# Patient Record
Sex: FEMALE | Race: WHITE | NOT HISPANIC OR LATINO | Employment: OTHER | URBAN - METROPOLITAN AREA
[De-identification: names, ages, dates, MRNs, and addresses within clinical notes are randomized per-mention and may not be internally consistent; named-entity substitution may affect disease eponyms.]

---

## 2017-01-18 PROBLEM — Z13.9 SCREENING: Status: ACTIVE | Noted: 2017-01-18

## 2017-08-05 PROBLEM — Z13.9 SCREENING: Status: RESOLVED | Noted: 2017-01-18 | Resolved: 2017-08-05

## 2018-12-16 PROBLEM — E87.6 DIURETIC-INDUCED HYPOKALEMIA: Status: ACTIVE | Noted: 2018-12-16

## 2018-12-16 PROBLEM — T50.2X5A DIURETIC-INDUCED HYPOKALEMIA: Status: ACTIVE | Noted: 2018-12-16

## 2019-02-19 PROBLEM — L72.3 INFLAMED SEBACEOUS CYST: Status: ACTIVE | Noted: 2019-02-19

## 2019-02-21 PROBLEM — E87.6 DIURETIC-INDUCED HYPOKALEMIA: Status: RESOLVED | Noted: 2018-12-16 | Resolved: 2019-02-21

## 2019-02-21 PROBLEM — T50.2X5A DIURETIC-INDUCED HYPOKALEMIA: Status: RESOLVED | Noted: 2018-12-16 | Resolved: 2019-02-21

## 2020-01-21 ENCOUNTER — TELEPHONE (OUTPATIENT)
Dept: NEUROLOGY | Facility: CLINIC | Age: 75
End: 2020-01-21

## 2020-01-21 NOTE — TELEPHONE ENCOUNTER
----- Message from Carla Gary sent at 1/21/2020  9:45 AM CST -----  Contact: Aliza  Patient's daughter called in regards to scheduling appt for her mom. Has referral in [facility]. Touching base, she was told if she did not hear from office to call to schedule.     Please call to schedule: [phone]- Aliza    Patient- [phone]

## 2020-01-21 NOTE — TELEPHONE ENCOUNTER
Returned call to pt daughter Aliza and discussed at length accessibility and memory drs, etc. Notified that we did not have availability until April timeframe with Dr. Abraham as Dr. Le would be on maternity leave.Offered [facility] for quicker accessibility until we open our schedule for April which Aliza was agreeable with. Pt added to waiting list and we will call once open. Verbalized understanding.

## 2020-02-11 ENCOUNTER — TELEPHONE (OUTPATIENT)
Dept: NEUROLOGY | Facility: CLINIC | Age: 75
End: 2020-02-11

## 2020-02-11 NOTE — TELEPHONE ENCOUNTER
----- Message from Ana M Le sent at 2/11/2020  4:12 PM CST -----  Contact: Juli /daughter [phone]  Pt flora she was referred  to Dr. Ramirez states she needs to make an apt they have a referral in the system - Memory changes please call back to discuss

## 2020-02-11 NOTE — TELEPHONE ENCOUNTER
Returned call to pt as daughter unavailable. Notified that Dr. Le on emergency medical leave and unable to make appt until her return around July timeframe. Verbalized understanding.

## 2020-03-31 ENCOUNTER — OFFICE VISIT (OUTPATIENT)
Dept: NEUROLOGY | Facility: CLINIC | Age: 75
End: 2020-03-31
Payer: MEDICARE

## 2020-03-31 DIAGNOSIS — E55.9 VITAMIN D DEFICIENCY, UNSPECIFIED: ICD-10-CM

## 2020-03-31 DIAGNOSIS — R41.3 MEMORY DIFFICULTIES: Primary | ICD-10-CM

## 2020-03-31 DIAGNOSIS — E78.5 DYSLIPIDEMIA: ICD-10-CM

## 2020-03-31 DIAGNOSIS — I10 ESSENTIAL HYPERTENSION: ICD-10-CM

## 2020-03-31 DIAGNOSIS — Q21.12 PATENT FORAMEN OVALE: ICD-10-CM

## 2020-03-31 PROBLEM — L72.3 INFLAMED SEBACEOUS CYST: Status: RESOLVED | Noted: 2019-02-19 | Resolved: 2020-03-31

## 2020-03-31 PROCEDURE — 99204 OFFICE O/P NEW MOD 45 MIN: CPT | Mod: 95,GC,, | Performed by: PSYCHIATRY & NEUROLOGY

## 2020-03-31 PROCEDURE — 99204 PR OFFICE/OUTPT VISIT, NEW, LEVL IV, 45-59 MIN: ICD-10-PCS | Mod: 95,GC,, | Performed by: PSYCHIATRY & NEUROLOGY

## 2020-03-31 NOTE — ASSESSMENT & PLAN NOTE
- Several months of reported memory difficulties (repeating questions, etc) reported by family and friends of patient  - Daughter reports acute worsening since patients  had a stroke in October 2019  - Memory issues worse with difficulty sleeping and stress  - Diagnosis on MDD on Prozac being managed by psychiatry   - MRI 5/18 without significant abnormality, no need to repeat imaging at this time  - Abbreviated MOCA 3/31/2020: 20/22 (<19 for MCI)    Plan  - Continued management of MDD  - MOCA not significant for MCI, but abbreviated version done due to telemedicine   - Recommend ambulatory referral to neuropsych for further evaluation   - Will check basic lab work (TSH, Vitamin D, CBC, CMP, B vitamins, Folate)

## 2020-03-31 NOTE — PROGRESS NOTES
"Telemedicine Virtual Visit  The patient location is: Patients Home  The chief complaint leading to consultation is: Memory difficulties   Visit type: Virtual visit with synchronous audio and video  Total time spent with patient: 60 minutes  Each patient to whom he or she provides medical services by telemedicine is:  (1) informed of the relationship between the physician and patient and the respective role of any other health care provider with respect to management of the patient; and (2) notified that he or she may decline to receive medical services by telemedicine and may withdraw from such care at any time.    Neurology Clinic  Initial Consult    Patient Name: Christine Garcia  MRN: [id number]    CC: Memory Difficulties     HPI: Christine Garcia is a 44-year-old female with a medical history significant for hypertension, major depression disorder (on Prozac 20mg, managed by psych), hyperlipidemia who presents to clinic today for evaluation of several months of progressive memory difficulty.  Patient is seen over virtual visit through telemedicine due to ongoing COVID-19 pandemic.  Patient is accompanied by her daughter who provides additional history.  Daughter states that for the past 6-7 months her mother has had increasing difficulty with memory, stating that she repeats questions and stories.  Patient denies that there is a problem at this time.  Patient denies any history of getting lost, difficulty driving or difficulty managing her activities of daily living.  Daughter states that she manages her mother's finances since her father's stroke in October of 2019.  Daughter states that she does call nightly to ensure that her mother has taken her medications, with the patient stating that she "has never been good with pills".  Patient's daughter states that the patient's memory difficulties acutely worsened in October 2019 following her husbands stroke.  Patient states that she is now his primary caregiver and that " this is associated significant amounts of stress and worsening depression. Patient reports that her diet has also worsened and that she no longer has an appetite.     During history, the patient was able to participate in conversation and acted appropriately.  A abbreviated MOCA was done over video chat with the patient scoring 20/22 (<19 is associated with MCI). Review of patients chart shows a previous MRI Brain in 5/2018 that showed no significant abnormalities other then age-appropriate generalized involutional changes most compatible with mild chronic microvascular disease.       Review of Systems   Constitutional: Positive for malaise/fatigue. Negative for chills, fever and weight loss.   HENT: Negative for ear pain, hearing loss, sinus pain and sore throat.    Eyes: Negative for blurred vision, double vision and photophobia.   Respiratory: Negative for cough and shortness of breath.    Cardiovascular: Negative for chest pain, leg swelling and PND.   Gastrointestinal: Negative for abdominal pain, constipation, diarrhea, nausea and vomiting.   Genitourinary: Negative for dysuria, frequency and urgency.   Musculoskeletal: Negative for back pain, falls, myalgias and neck pain.   Neurological: Negative for dizziness, tingling, tremors, sensory change, speech change, focal weakness, seizures, loss of consciousness, weakness and headaches.   Psychiatric/Behavioral: Positive for depression and memory loss. Negative for hallucinations and substance abuse. The patient is not nervous/anxious and does not have insomnia.        Past Medical History  Past Medical History:   Diagnosis Date    Dyslipidemia 10/2/2015    Hyperlipidemia     Hypertension     minimal    Migraines     Obesity 10/2/2015    Patent foramen ovale 10/2/2015       Medications    Current Outpatient Medications:     aspirin (ECOTRIN) 81 MG EC tablet, Take 81 mg by mouth once daily. , Disp: , Rfl:     atorvastatin (LIPITOR) 10 MG tablet, Take 10 mg  by mouth once daily., Disp: , Rfl:     cycloSPORINE (RESTASIS) 0.05 % ophthalmic emulsion, Place 1 drop into both eyes 2 (two) times daily., Disp: , Rfl:     doxycycline (VIBRA-TABS) 100 MG tablet, Take 1 tablet (100 mg total) by mouth 2 (two) times daily., Disp: 14 tablet, Rfl: 0    lorcaserin (BELVIQ) 10 mg Tab, Take 10 mg by mouth 2 (two) times daily., Disp: 60 tablet, Rfl: 0    meloxicam (MOBIC) 15 MG tablet, Take 1 tablet (15 mg total) by mouth once daily. (Patient taking differently: Take 15 mg by mouth as needed. ), Disp: 30 tablet, Rfl: 2    methylPREDNISolone (MEDROL DOSEPACK) 4 mg tablet, use as directed, Disp: 1 Package, Rfl: 0    nystatin (MYCOSTATIN) 100,000 unit/mL suspension, Take 6 mLs (600,000 Units total) by mouth 3 (three) times daily. Swish and swallow., Disp: 120 mL, Rfl: 0  No current facility-administered medications for this visit.     Facility-Administered Medications Ordered in Other Visits:     omnipaque 350 iohexol 80 mL, 80 mL, Intravenous, ONCE PRN, BRENDEN Bailey Jr., MD  Any other notable medications as documented in HPI    Allergies  Review of patient's allergies indicates:  No Known Allergies    Social History  Social History     Socioeconomic History    Marital status:      Spouse name: Not on file    Number of children: Not on file    Years of education: Not on file    Highest education level: Not on file   Occupational History    Not on file   Social Needs    Financial resource strain: Not on file    Food insecurity:     Worry: Not on file     Inability: Not on file    Transportation needs:     Medical: Not on file     Non-medical: Not on file   Tobacco Use    Smoking status: Former Smoker     Packs/day: 0.50     Years: 15.00     Pack years: 7.50    Smokeless tobacco: Never Used   Substance and Sexual Activity    Alcohol use: Yes     Alcohol/week: 1.0 standard drinks     Types: 1 Shots of liquor per week     Comment: occ    Drug use: No    Sexual  activity: Yes     Partners: Male     Birth control/protection: Post-menopausal   Lifestyle    Physical activity:     Days per week: Not on file     Minutes per session: Not on file    Stress: Not on file   Relationships    Social connections:     Talks on phone: Not on file     Gets together: Not on file     Attends Taoist service: Not on file     Active member of club or organization: Not on file     Attends meetings of clubs or organizations: Not on file     Relationship status: Not on file   Other Topics Concern    Not on file   Social History Narrative    Not on file     Any other notable Social History as documented in HPI.    Family History  Family History   Problem Relation Age of Onset    Diabetes Father     Heart disease Father     COPD Father      Any other notable FMH as documented in HPI.    Physical Exam  There were no vitals taken for this visit.    Physical Exam   Constitutional: She is oriented to person, place, and time. She appears well-developed and well-nourished. No distress.   Eyes: EOM are normal.   Neck: Normal range of motion.   Pulmonary/Chest: No respiratory distress.   Musculoskeletal: Normal range of motion.   Neurological: She is alert and oriented to person, place, and time.   Skin: She is not diaphoretic.   Nursing note and vitals reviewed.      Neurologic Exam: The patient is awake, alert and oriented. Language is fluent.  Fund of knowledge is appropriate.     Cranial nerves:   Ocular motility is full in all cardinal positions of gaze.   Facial activation is symmetric.     Motor examination of all extremities demonstrates full range of movement in all 4 extremities. Antigravity.     Sensory examination   Deferred due to video visit    Deep tendon reflexes  Deferred due to video visit    Gait: Normal tandem, and casual gait.    Coordination:   Deferred due to video visit    Miscellaneous:   Abbreviated MOCA 20/22 (placed in media tab)      Lab and Test Results    WBC   Date  Value Ref Range Status   01/30/2019 7.63 3.90 - 12.70 K/uL Final   12/13/2018 9.04 3.90 - 12.70 K/uL Final   07/18/2018 6.46 3.90 - 12.70 K/uL Final     Hemoglobin   Date Value Ref Range Status   01/30/2019 15.3 12.0 - 16.0 g/dL Final   12/13/2018 14.9 12.0 - 16.0 g/dL Final   07/18/2018 15.4 12.0 - 16.0 g/dL Final     Hematocrit   Date Value Ref Range Status   01/30/2019 45.2 37.0 - 48.5 % Final   12/13/2018 43.4 37.0 - 48.5 % Final   07/18/2018 43.9 37.0 - 48.5 % Final     Platelets   Date Value Ref Range Status   01/30/2019 277 150 - 350 K/uL Final   12/13/2018 303 150 - 350 K/uL Final   07/18/2018 260 150 - 350 K/uL Final     Glucose   Date Value Ref Range Status   01/30/2019 78 70 - 110 mg/dL Final     Comment:     The ADA recommends the following guidelines for fasting glucose:  Normal:       less than 100 mg/dL  Prediabetes:  100 mg/dL to 125 mg/dL  Diabetes:     126 mg/dL or higher     12/21/2018 73 70 - 110 mg/dL Final     Comment:     The ADA recommends the following guidelines for fasting glucose:  Normal:       less than 100 mg/dL  Prediabetes:  100 mg/dL to 125 mg/dL  Diabetes:     126 mg/dL or higher     12/13/2018 86 70 - 110 mg/dL Final     Comment:     The ADA recommends the following guidelines for fasting glucose:  Normal:       less than 100 mg/dL  Prediabetes:  100 mg/dL to 125 mg/dL  Diabetes:     126 mg/dL or higher       Sodium   Date Value Ref Range Status   01/30/2019 139 136 - 145 mmol/L Final   12/21/2018 139 136 - 145 mmol/L Final   12/13/2018 140 136 - 145 mmol/L Final     Potassium   Date Value Ref Range Status   01/30/2019 4.7 3.5 - 5.1 mmol/L Final   12/21/2018 4.3 3.5 - 5.1 mmol/L Final   12/13/2018 3.2 (L) 3.5 - 5.1 mmol/L Final     Chloride   Date Value Ref Range Status   01/30/2019 105 95 - 110 mmol/L Final   12/21/2018 106 95 - 110 mmol/L Final   12/13/2018 100 95 - 110 mmol/L Final     CO2   Date Value Ref Range Status   01/30/2019 27 22 - 31 mmol/L Final   12/21/2018 24 22 -  31 mmol/L Final   12/13/2018 30 22 - 31 mmol/L Final     BUN, Bld   Date Value Ref Range Status   01/30/2019 17 7 - 18 mg/dL Final   12/21/2018 16 7 - 18 mg/dL Final   12/13/2018 20 (H) 7 - 18 mg/dL Final     Creatinine   Date Value Ref Range Status   01/30/2019 0.81 0.50 - 1.40 mg/dL Final   12/21/2018 0.68 0.50 - 1.40 mg/dL Final   12/13/2018 0.77 0.50 - 1.40 mg/dL Final     Calcium   Date Value Ref Range Status   01/30/2019 9.9 8.4 - 10.2 mg/dL Final   12/21/2018 9.0 8.4 - 10.2 mg/dL Final   12/13/2018 9.4 8.4 - 10.2 mg/dL Final     Magnesium   Date Value Ref Range Status   10/03/2017 1.8 1.6 - 2.6 mg/dL Final     Alkaline Phosphatase   Date Value Ref Range Status   01/30/2019 86 38 - 145 U/L Final   12/13/2018 80 38 - 145 U/L Final   09/16/2017 72 38 - 145 U/L Final     ALT   Date Value Ref Range Status   01/30/2019 25 10 - 44 U/L Final   12/13/2018 21 10 - 44 U/L Final   12/04/2017 33 10 - 44 U/L Final     AST   Date Value Ref Range Status   01/30/2019 25 14 - 36 U/L Final   12/13/2018 23 14 - 36 U/L Final   09/16/2017 25 14 - 36 U/L Final     Images:  All previous relevant imaging has been personally interpreted by myself      Assessment and Plan    Problem List Items Addressed This Visit        Neuro    Memory difficulties - Primary    Current Assessment & Plan     - Several months of reported memory difficulties (repeating questions, etc) reported by family and friends of patient  - Daughter reports acute worsening since patients  had a stroke in October 2019  - Memory issues worse with difficulty sleeping and stress  - Diagnosis on MDD on Prozac being managed by psychiatry   - MRI 5/18 without significant abnormality, no need to repeat imaging at this time  - Abbreviated MOCA 3/31/2020: 20/22 (<19 for MCI)    Plan  - Continued management of MDD  - MOCA not significant for MCI, but abbreviated version done due to telemedicine   - Recommend ambulatory referral to neuropsych for further evaluation   -  Will check basic lab work (TSH, Vitamin D, CBC, CMP, B vitamins, Folate)         Relevant Orders    Comprehensive metabolic panel    CBC auto differential    TSH    VITAMIN D    VITAMIN B12    FOLATE    Ambulatory referral/consult to Neuropsychology    VITAMIN B1    VITAMIN B6    VITAMIN B2       Cardiac/Vascular    Essential hypertension    Current Assessment & Plan     Continue management per primary care for secondary stroke prevention         Dyslipidemia    Current Assessment & Plan     Continued management per primary care for secondary stroke prevention  Continue atorvastatin         Relevant Orders    VITAMIN B1    VITAMIN B6    VITAMIN B2    Patent foramen ovale    Current Assessment & Plan     Continue aspirin and atorvastatin for secondary stroke prevention           Other Visit Diagnoses     Vitamin D deficiency, unspecified         Relevant Orders    VITAMIN D    VITAMIN B1    VITAMIN B6    VITAMIN B2            Jemal Marquez MD  Neurology Resident - PGY2  [facility]  [address] [place] [address]

## 2020-04-02 ENCOUNTER — TELEPHONE (OUTPATIENT)
Dept: NEUROLOGY | Facility: CLINIC | Age: 75
End: 2020-04-02

## 2020-04-02 NOTE — PROGRESS NOTES
I have reviewed the history and physical, assessments, and plan, I concur with her/his documentation of Christine Garcia. Patient was seen and examined with the resident.    Also discussed was the potential for pseudodementia given increased stress and possible depression associated with 's stroke.    Hazel Figueroa MD  General Neurology Staff  [facility]

## 2020-04-03 ENCOUNTER — PATIENT MESSAGE (OUTPATIENT)
Dept: NEUROLOGY | Facility: CLINIC | Age: 75
End: 2020-04-03

## 2020-04-06 ENCOUNTER — OFFICE VISIT (OUTPATIENT)
Dept: NEUROLOGY | Facility: CLINIC | Age: 75
End: 2020-04-06
Payer: MEDICARE

## 2020-04-06 DIAGNOSIS — R41.3 MEMORY DIFFICULTIES: ICD-10-CM

## 2020-04-06 DIAGNOSIS — F33.1 MAJOR DEPRESSIVE DISORDER, RECURRENT, MODERATE: ICD-10-CM

## 2020-04-06 PROCEDURE — 99499 NO LOS: ICD-10-PCS | Mod: 95,,, | Performed by: PSYCHIATRY & NEUROLOGY

## 2020-04-06 PROCEDURE — 96121 NUBHVL XM PHY/QHP EA ADDL HR: CPT | Mod: 95,,, | Performed by: PSYCHIATRY & NEUROLOGY

## 2020-04-06 PROCEDURE — 96116 PR NEUROBEHAVIORAL STATUS EXAM BY PSYCH/PHYS: ICD-10-PCS | Mod: 95,,, | Performed by: PSYCHIATRY & NEUROLOGY

## 2020-04-06 PROCEDURE — 99499 UNLISTED E&M SERVICE: CPT | Mod: 95,,, | Performed by: PSYCHIATRY & NEUROLOGY

## 2020-04-06 PROCEDURE — 96121 PR NEUROBEHAVIORAL STAT EXAM, EA ADDTL HR: ICD-10-PCS | Mod: 95,,, | Performed by: PSYCHIATRY & NEUROLOGY

## 2020-04-06 PROCEDURE — 96116 NUBHVL XM PHYS/QHP 1ST HR: CPT | Mod: 95,,, | Performed by: PSYCHIATRY & NEUROLOGY

## 2020-04-06 NOTE — PROGRESS NOTES
NEUROPSYCHOLOGY CONSULT (TELEHEALTH)    Referral Information  Name: Christine Garcia  MRN: [id number]  : 1945  Age: 74 y.o.  Race: White  Gender: female  Referring Provider: Jemal Marquez Md  [address] [place] [address]  Billing: See below for details as coding/billing has changed   Telemedicine:   The patient location is: [place], [place]  The provider location is: [place], [place]  The chief complaint leading to consultation/medical necessity is: Cognitive concerns and limited in-person visits due to COVID-19.  Visit type: Virtual visit with synchronous audio and video  Total time spent with patient: 60 minutes  Each patient to whom he or she provides medical services by telemedicine is:  (1) informed of the relationship between the physician and patient and the respective role of any other health care provider with respect to management of the patient; and (2) notified that he or she may decline to receive medical services by telemedicine and may withdraw from such care at any time.  Consent/Emergency Plan: The patient expressed an understanding of the purpose of the evaluation and consented to all procedures. I informed the patient of limits to confidentiality and discussed an emergency plan. She provided consent to speak with her daughter, who was present during the appointment.      SUMMARY/TREATMENT PLAN   Results from the interview indicate the following diagnoses and treatment plan recommendations. The patient may require help from family to follow a treatment plan.    Diagnoses  Problem List Items Addressed This Visit        Neuro    Memory difficulties    Overview     Ms. Garcia's family reported gradual cognitive changes (repeating questions) over the last approximately two years, with an increase in difficulty after her 's stroke in 2019. At this time, her children have taken over financial management, shopping, household tasks, and reminders to take medications.  Some of this assistance is due to increased stress and symptoms of depression. Brief cognitive screening was within normal limits during a neurology visit on 3/31/2020 (modified MOCA = 20/22).         Current Assessment & Plan     At this time, diagnosis is deferred pending neuropsychological evaluation. Ms. Garcia has memory difficulty and changes in her ability to complete some instrumental activities of daily living without assistance. However, she also has a history of depression and increased stressors, which may be contributing to memory changes. Differential diagnoses include mild cognitive impairment and vascular dementia.    Recommendations:   · Neuropsychology Follow-up: Follow-up when the clinic re-opens for a full assessment of cognitive functioning.     · Medical Follow-up: Continue usual follow up for current active medical issues.   Recommendations for Ms. Garcia:  a. Processing Speed:   i. Using multiple modalities (e.g., listening, writing notes, asking questions, recording) to learn new information is likely to allow additional time for processing, thus improving memory for the material.   ii. Allowing sufficient time to complete tasks will reduce frustration and help to ensure completion.  b. Executive Functioning:  i. Dont attempt to multi-task.  Separate tasks so that each can be completed one at a time.  ii. Consider using a calendar/day planner, as that may be effective to help you plan and stay on track.  Color-coding specific tasks by importance may add additional benefit to your planner.  iii. Break down large projects into smaller tasks and write down the steps to completing the task.  Taking notes while reading can help with recall.  c. Storing Information: Use the below strategies to help you further enhance how information is stored.  i. Rehearse - Immediately after seeing/hearing something, try to recall it.  Wait a few minutes, then check again.  Gradually lengthen the intervals  between rehearsals.  ii. Repetition of learned material is critical to ensure storage of information to be learned. Self-test at home to ensure learning.  iii. Write down important information to improve your attention and focus and to have something to look back on when you need to recall it.  iv. Make sure the person doesnt rattle off, but presents in a clear, logical, and unhurried manner.   d. Recalling Information:  i. Jog your memory - Lose something?  Think back to when you last had it.  What did you do next?  And after that?  Mentally walk yourself through each activity that followed.  Prodding your memory this way may enable you to recall the location of the missing item.  ii. Use a cue - Symbolic reminders (the proverbial string around the finger) are helpful.  So too are memos, timers, calendar notes, etc.--keep them in visible, appropriate places.  iii. Get organized - Have fixed locations for all important papers, key phone numbers, medications, keys, wallet, glasses, tools, etc.  iv. Develop routines - Routines can anchor memories so they do not drift away.    e. Resources: Consider resources for support through the Governors Office of Elderly Affairs (http://goea.[place].gov/), [place] Chapter of the Alzheimers Association (www.alz.org/[place]/), the Family Caregiver [place] (www.caregiver.org), and the American Psychological Association (http://www.apa.org/pi/about/publications/caregivers/consumers/index.aspxconsumers/index.aspx).    · Practice good cognitive/brain health hygiene:  · Engage in regular exercise, which increases alertness and arousal and can improve attention and focus.  Consider lower impact exercises, such as yoga or light walking.  · Get a good nights sleep, as this can enhance alertness and cognition.  · Eat healthy foods and balanced meals. It is notable that research indicates certain nutrients may aid in brain function, such as B vitamins (especially B6, B12, and  folic acid), antioxidants (such as vitamins C and E, and beta carotene), and Omega-3 fatty acids. Talk with your physician or nutritionist about whats right for you.   · Keep your brain active. Find activities to stay mentally active, such as reading, games (cards, checkers), puzzles (crosswords, Sudoku, jig saw), crafts (models, woodworking), gardening, or participating in activities in the community.  · Stay socially engaged. Continue staying active with your family and friends.    · Prepare for the future: Ms. Garcia and caregivers should consider formal arrangements to allow a designated person to make medical and financial decisions for Ms. Garcia, should she become unable to do so.  Options to consider include designating a healthcare proxy, medical and/or financial power of , and completing advanced directives for healthcare decisions and estate planning (e.g., finalizing a will).  If cost is prohibitive, [facility] [employer] (https://Candid io.org/) provides free  for individuals with low income.              Psychiatric    Major depressive disorder, recurrent, moderate    Overview     Ms. Garcia has a pre-existing history of depression for which she was prescribed medication. Symptoms increased following her 's stroke, and her daughter expressed concern that she was not taking her medication as prescribed.         Current Assessment & Plan     · Behavioral/Neuropsychiatric Symptoms:   · Medication Management:   · Continued medication management is recommended to treat depressed mood.    · Psychology/Therapy: Consultation with a therapist to address caregiver stress and depressed mood.             COVID-19 Recommendations:  Stay healthy:    Stay home as much as possible, avoid groups or crowds, and keep six feet away from other people   Wash your hands frequently with soap and water   Understand when and how to use a mask  who.int/emergencies/diseases/novel-coronavirus-2019/advice-for-public/when-and-how-to-use-masks   Ask a neighbor, friend, or relative to help run errands for you    Ask your pharmacy if they will deliver medications or find a pharmacy with a drive-thru window   Try a new budget friendly recipe StyleCaster.gov/eathealthy/budget/budget-recipes     Stay resourceful   Consider using a bidet (handheld bottle or toilet seat) as an alternative to toilet paper   Consider dry disposable washcloths with no-rinse cleanser or watered down baby shampoo as an alternative to pre-moistened wipes     Stay active:    Try dancing around the house to your favorite music   A pedal exerciser can be ordered online and offers an economical way to do some 'indoor bicycling'   Sit and Be Fit exercise video clips are available free on YouTPlanandoo.com/user/SitandBeFitTVSHOW   Other ideas for activities from the Alzheimer's Association: https://www.alz.org/media/Documents/alzheimers-dementia-activities-at-home-middle-late-b.pdf     Stay engaged:    Consider arranging a call or video chat schedule with friends and family. Ask relatives to send cards or letters by mail. Consider a television schedule of programs the person prefers (cooking shows, Episcopal services, music programs, old shows/movies).    [employer] Connected program offers free telephone-based educational programs and activities [employer]services/well-connected/   aqxhz4cdu4yfnce.com/ offers some free activity ideas   The [place] on Aging offers a free 24/7 [place] Line if you need to talk: [phone]     Please don't hesitate to stay in touch with us and let us know how you are doing.    Thank you for allowing me to participate in Ms. Garcia's care.  If you have any questions, please contact me at [phone].Melissa Zhao, Ph.D., ABPPBoard Certified in Clinical NeuropsychologyOchsner Health  Department of Neurology    HISTORY OF PRESENT  "ILLNESS AND CURRENT SYMPTOMS     Records noted Ms. Garcia initially visited neurology in March 2020. During that visit, her daughter reported 6-7 months of cognitive concerns, including repeating questions and stories. Her  had a stroke in October 2019, which has increased stress and exacerbated cognitive difficulties. Her daughter took over managing finances and reminds her to take her medications. Performance on a brief cognitive screening measure was within normal limits (modified Brijesh Cognitive Assessment [MoCA] = 20/22). She had difficulty with auditory attention and delayed recall (2/5 words, 3/3 words with cueing).    During the interview, Ms. Garcia reported some disorientation to date due to a change in routine but did not agree with her daughter's report of her being very forgetful.  She also reported adjusting to a change in her previous routine due to her 's stroke in October 2019 and serving as his primary caregiver.  She said her whole focus is on him but denied feeling depressed mood or feeling burnout.  Again, in contrast, her daughter has noticed that she no longer participates in activities for herself and has difficulty keeping up with activities of daily living in the context of caregiving for her .  Her daughter also noted that she and her siblings had concern for cognitive changes before October 2019, and those have not subsided with the increased stress.    Cognitive Symptoms:   Type/Examples:   · Attention: None reported.  · Mental Speed: Ms. Garcia said she is "laid back." Her daughter said she has noticed some slowing.   · Memory: Her daughter said she asks questions repetitively, even within a few hours. Ms. Garcia said she may forget conversations if they are not important. She said she stays on top of appointments, but her daughter indicated she had to purchase a calendar to place on the refrigrator.   · Language: Ms. Garcia reported longstanding word " finding difficulty. No receptive language difficulty.  · Visuospatial/Perceptual: None reported.  · Executive Functioning:Ms. Garcia reported she needs to use a calendar. Her daughter said she is less organized than she used to be.   Onset:  Her daughter noticed gradual changes starting in late 2017.    Course: Concern for cognitive change over time, with increased difficulty in the context of her 's stroke in October 2019.    Current Functional Status/Needs:  ADLs  Self-Care Eating Safety Other   Independent Independent Independent      Instrumental IADLs:   Driving Medications/Health Household Finances   No concerns noted. She has difficulty remembering to take her medication but also has had times when she did not want to take it. Her daughter gives reminders and manages her father's medication.  Ms. Garcia has help with household tasks after her 's stroke. Her daughter said she was not keeping her home as neatly prior to October 2019. Her daughter helps with laundry and grocery shopping. Family took over after October 2019, but her daughter said they had concern for mistakes prior to that time.       Psychiatric/Behavioral Symptoms:  Mood:  Depression/Dysphoria Anxiety/Fearfulness Irritability   Ms. Garcia said she feels sad at times. Her daughter said she has noticed depressed mood and lack of self care. Her daughter indicated friends and medical providers have noticed changes.  None reported. Her daughter noted some irritability due to stress.     Behavior:  Agitation/Resistance Delusions/Paranoia Hallucinations   Resistance to taking medication but will accept feedback and support from family without defensiveness. None reported None reported     Apathy/Motivation Repetitive/Restlessness Other   Her daughter noted reduced engagement in pleasurable activities. She asks repetitive questions and tells stories repeatedly.      Neurovegetative:  Sleep/Nighttime  [employer]et[employer]   She sleeps  well. Her daughter reported changes in diet. She eats less when depressed but eats sweets and coke. Her daughter is doing the grocery shopping. She is not physically active on a regular basis right now.     Suicidal/Homicidal Ideation: None reported.    Physical Symptoms: No changes in hearing or vision. No motor changes.     PERTINENT BACKGROUND INFORMATION   SOCIAL HISTORY    · Family Status:  approximately 50 years  · Current Living Situation: Lives with . Daughter lives nearby and checks on them frequently.  · Primary Source of Support: family, friends  · Daily Activities: Limited due to caregiving and COVID-19. She was an active volunteer, serving on [employer] and foundation boards, founding and serving as president of a community group. She continues to [employer].  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  Family Psychiatric History: Depression in the family and possible bipolar disorder    MEDICAL STATUS  Patient Active Problem List   Diagnosis    Essential hypertension    Dyslipidemia    Obesity    Patent foramen ovale    Memory difficulties    Major depressive disorder, recurrent, moderate     Past Medical History:   Diagnosis Date    Dyslipidemia 10/2/2015    Hyperlipidemia     Hypertension     minimal    Migraines     Obesity 10/2/2015    Patent foramen ovale 10/2/2015     Past Surgical History:   Procedure Laterality Date    COLONOSCOPY N/A 1/18/2017    Procedure: COLONOSCOPY;  Surgeon: Gerber Naylor MD;  [facility];  Service: Endoscopy;  Laterality: N/A;     HYSTERECTOMY      left elbow      left knee       Updated/Relevant Neurologic History:  · Falls: None reported  · TBI: None reported  · Seizures: None reported  · Stroke: None reported  · Movement Concerns: None reported  · Referral Diagnosis: Memory difficulties    Recent Labs and Imaging  No results found for: NGPJLGKM10  No results found for: RPR  No results found for: FOLATE  Lab Results   Component Value Date    TSH 1.650 01/30/2019     No results found for: LABA1C, HGBA1C  No results found for: HIV1X2, OSW58MNQH     Brain MRI:  5/29/2018:  Impression       1. No acute intracranial abnormality is visualized.  2. Age-appropriate generalized involutional changes are seen along with findings most compatible with mild chronic microvascular ischemic changes.  3. Additional findings and details as above.       Current Outpatient Medications:     aspirin (ECOTRIN) 81 MG EC tablet, Take 81 mg by mouth once daily. , Disp: , Rfl:     atorvastatin (LIPITOR) 10 MG tablet, Take 10 mg by mouth once daily., Disp: , Rfl:     cycloSPORINE (RESTASIS) 0.05 % ophthalmic emulsion, Place 1 drop into both eyes 2 (two) times daily., Disp: , Rfl:     doxycycline (VIBRA-TABS) 100 MG tablet, Take 1 tablet (100 mg total) by mouth 2 (two) times daily., Disp: 14 tablet, Rfl: 0    lorcaserin (BELVIQ) 10 mg Tab, Take 10 mg by mouth 2 (two) times daily., Disp: 60 tablet, Rfl: 0    meloxicam (MOBIC) 15 MG tablet, Take 1 tablet (15 mg total) by mouth once daily. (Patient taking differently: Take 15 mg by mouth as needed. ), Disp: 30 tablet, Rfl: 2    methylPREDNISolone (MEDROL DOSEPACK) 4 mg tablet, use as directed, Disp: 1 Package, Rfl: 0    nystatin (MYCOSTATIN) 100,000 unit/mL suspension, Take 6 mLs (600,000 Units total) by mouth 3 (three) times daily. Swish and swallow., Disp: 120 mL, Rfl: 0  No current facility-administered medications for this visit.     Facility-Administered Medications Ordered in Other Visits:     omnipaque  "350 iohexol 80 mL, 80 mL, Intravenous, ONCE PRN, BRENDEN Bailey Jr., MD     Ms. Garcia reported she is also taking fluoxetine.    Updated/Relevant Psychiatric History: Depression in the past for which she was prescribed medication and engaged in therapy.    No substance use concerns.    MENTAL STATUS AND OBSERVATIONS:  APPEARANCE: Casually dressed and adequate grooming/hygiene.   ALERTNESS/ORIENTATION: Attentive and alert. Generally oriented to time, with the exception of date. Fully oriented to place.   GAIT: Not assessed  MOTOR MOVEMENTS/MANNERISMS: Not assessed  SPEECH/LANGUAGE: Normal in rate, rhythm, tone, and volume. No significant word finding difficulty noted. Expressive and receptive language was normal.  STATED MOOD/AFFECT: The patients stated mood was "fine." Affect was congruent with stated mood.   INTERPERSONAL BEHAVIOR: Rapport was quickly and easily established   SUICIDALITY/HOMICIDALITY: Denied  HALLUCINATIONS/DELUSIONS: None evidenced or endorsed  THOUGHT PROCESSES/INSIGHT: Thoughts seemed logical and goal-directed.     QUESTIONNAIRE RESPONSES    Questionnaires were completed via telephone with a psychometrist and reviewed and incorporated by the neuropsychologist.    IADL 4/6/2020   Ability to Use Telephone Operates telephone on own initiative, looks up and dials numbers   Shopping Takes care of all shopping needs independently   Food Preparation Heats and serves prepared meals or prepares meals but does not maintain adequate diet   Housekeeping Performs light daily tasks, but cannot maintain acceptable level of cleanliness   Laundry Does personal laundry completely   Mode of Transportation Travels independently on public tranportation or drives own car   Responsibility for Own Medications Takes responsibility if medication is prepared in advance in separate dosages   Ability to Handle Finances Manages day-to-day purchases, but needs help with banking, major purchases, etc       NPIQ RFS " 4/6/2020   WHO IS FILLING OUT FORM? Caregiver   Does this patient have false beliefs, such as thinking that others are stealing from him/her or planning to harm him/her in some way? No   Does this patient have hallucinations such as false visions or voices? Kim she/he seem to hear or see things that are not present? No   Is the patient resistive to help from others at times, or hard to handle? No   Does the patient seem sad or say that he/she is depressed? Yes   Depression/Dysphoria Severity 3   Depression/Dysphoria Distress 6   Does the patient become upset when  from you? Does he/she have any other signs of nervousness such as shortness of breath, sighing, being unable tor elax, or feeling excessively tense? No   Does the patient appear to feel good or act excessively happy? No   Does this patient seem less interested in his/her usual activities or in the activities and plans of others? Yes   Apathy/Indifference Severity 3   Apathy/Indifference Distress 6   Does this patient seem to act cumpolsively, for example, talking to strangers as if she/he knows them, or saying things that may hurt people's feelings? No   Is the patient impatient and cranky? Does he/she have difficulty coping with delays or waiting for planned activities? No   Does the patient engage in repetitive activities such as pacing around the house, handling buttons, wrapping string, or doing other things repeatedly? No   Does this patient awaken you during the night, rise too early in the morning, or take excessive naps during the day? No   Has the patient lost or gained weight, or had a change in the type of food he/she likes? Yes   Apetitie/Eating Severity 3   Apetite/Eating Distress 6   NPI Total Severity Score 9   NPI Total Distress Score 18     BILLING  Service Description CPT Code Minutes Units   Neurobehavioral status exam by physician [id number] [phone]   Each additional hour by physician [id number] [phone]

## 2020-04-06 NOTE — Clinical Note
Thank you for the referral. I'll see them for testing when the schedule opens up. Please let me know if you have any questions.[place],Melissa

## 2020-04-08 PROBLEM — F33.1 MAJOR DEPRESSIVE DISORDER, RECURRENT, MODERATE: Status: ACTIVE | Noted: 2020-04-08

## 2020-04-08 NOTE — ASSESSMENT & PLAN NOTE
At this time, diagnosis is deferred pending neuropsychological evaluation. Ms. Garcia has memory difficulty and changes in her ability to complete some instrumental activities of daily living without assistance. However, she also has a history of depression and increased stressors, which may be contributing to memory changes. Differential diagnoses include mild cognitive impairment and vascular dementia.    Recommendations:   · Neuropsychology Follow-up: Follow-up when the clinic re-opens for a full assessment of cognitive functioning.     · Medical Follow-up: Continue usual follow up for current active medical issues.   Recommendations for Ms. Garcia:  a. Processing Speed:   i. Using multiple modalities (e.g., listening, writing notes, asking questions, recording) to learn new information is likely to allow additional time for processing, thus improving memory for the material.   ii. Allowing sufficient time to complete tasks will reduce frustration and help to ensure completion.  b. Executive Functioning:  i. Dont attempt to multi-task.  Separate tasks so that each can be completed one at a time.  ii. Consider using a calendar/day planner, as that may be effective to help you plan and stay on track.  Color-coding specific tasks by importance may add additional benefit to your planner.  iii. Break down large projects into smaller tasks and write down the steps to completing the task.  Taking notes while reading can help with recall.  c. Storing Information: Use the below strategies to help you further enhance how information is stored.  i. Rehearse - Immediately after seeing/hearing something, try to recall it.  Wait a few minutes, then check again.  Gradually lengthen the intervals between rehearsals.  ii. Repetition of learned material is critical to ensure storage of information to be learned. Self-test at home to ensure learning.  iii. Write down important information to improve your attention and focus and  to have something to look back on when you need to recall it.  iv. Make sure the person doesnt rattle off, but presents in a clear, logical, and unhurried manner.   d. Recalling Information:  i. Jog your memory - Lose something?  Think back to when you last had it.  What did you do next?  And after that?  Mentally walk yourself through each activity that followed.  Prodding your memory this way may enable you to recall the location of the missing item.  ii. Use a cue - Symbolic reminders (the proverbial string around the finger) are helpful.  So too are memos, timers, calendar notes, etc.--keep them in visible, appropriate places.  iii. Get organized - Have fixed locations for all important papers, key phone numbers, medications, keys, wallet, glasses, tools, etc.  iv. Develop routines - Routines can anchor memories so they do not drift away.    e. Resources: Consider resources for support through the Governors Office of Elderly Affairs ([place]://goea.[place].gov/), [place] Chapter of the Alzheimers Association (www.alz.org/[facility]/), the Family Caregiver [place] (www.caregiver.org), and the American Psychological Association (http://www.apa.org/pi/about/publications/caregivers/consumers/index.aspxconsumers/index.aspx).    · Practice good cognitive/brain health hygiene:  · Engage in regular exercise, which increases alertness and arousal and can improve attention and focus.  Consider lower impact exercises, such as yoga or light walking.  · Get a good nights sleep, as this can enhance alertness and cognition.  · Eat healthy foods and balanced meals. It is notable that research indicates certain nutrients may aid in brain function, such as B vitamins (especially B6, B12, and folic acid), antioxidants (such as vitamins C and E, and beta carotene), and Omega-3 fatty acids. Talk with your physician or nutritionist about whats right for you.   · Keep your brain active. Find activities to stay mentally active,  such as reading, games (cards, checkers), puzzles (crosswords, Sudoku, jig saw), crafts (models, woodworking), gardening, or participating in activities in the community.  · Stay socially engaged. Continue staying active with your family and friends.    · Prepare for the future: Ms. Garcia and caregivers should consider formal arrangements to allow a designated person to make medical and financial decisions for Ms. Garcia, should she become unable to do so.  Options to consider include designating a healthcare proxy, medical and/or financial power of , and completing advanced directives for healthcare decisions and estate planning (e.g., finalizing a will).  If cost is prohibitive, [facility] Legal Services (https://ls.org/) provides free  for individuals with low income.

## 2020-04-08 NOTE — ASSESSMENT & PLAN NOTE
· Behavioral/Neuropsychiatric Symptoms:   · Medication Management:   · Continued medication management is recommended to treat depressed mood.    · Psychology/Therapy: Consultation with a therapist to address caregiver stress and depressed mood.

## 2020-04-08 NOTE — PATIENT INSTRUCTIONS
Recommendations:   · Neuropsychology Follow-up: Follow-up when the clinic re-opens for a full assessment of cognitive functioning.     · Medical Follow-up: Continue usual follow up for current active medical issues.      · Behavioral/Neuropsychiatric Symptoms:   · Medication Management:   · Continued medication management is recommended to treat depressed mood.    · Psychology/Therapy: Consultation with a therapist to address caregiver stress and depressed mood.    Recommendations for Ms. Garcia:  a. Processing Speed:   i. Using multiple modalities (e.g., listening, writing notes, asking questions, recording) to learn new information is likely to allow additional time for processing, thus improving memory for the material.   ii. Allowing sufficient time to complete tasks will reduce frustration and help to ensure completion.  b. Executive Functioning:  i. Dont attempt to multi-task.  Separate tasks so that each can be completed one at a time.  ii. Consider using a calendar/day planner, as that may be effective to help you plan and stay on track.  Color-coding specific tasks by importance may add additional benefit to your planner.  iii. Break down large projects into smaller tasks and write down the steps to completing the task.  Taking notes while reading can help with recall.  c. Storing Information: Use the below strategies to help you further enhance how information is stored.  i. Rehearse - Immediately after seeing/hearing something, try to recall it.  Wait a few minutes, then check again.  Gradually lengthen the intervals between rehearsals.  ii. Repetition of learned material is critical to ensure storage of information to be learned. Self-test at home to ensure learning.  iii. Write down important information to improve your attention and focus and to have something to look back on when you need to recall it.  iv. Make sure the person doesnt rattle off, but presents in a clear, logical, and unhurried  manner.   d. Recalling Information:  i. Jog your memory - Lose something?  Think back to when you last had it.  What did you do next?  And after that?  Mentally walk yourself through each activity that followed.  Prodding your memory this way may enable you to recall the location of the missing item.  ii. Use a cue - Symbolic reminders (the proverbial string around the finger) are helpful.  So too are memos, timers, calendar notes, etc.--keep them in visible, appropriate places.  iii. Get organized - Have fixed locations for all important papers, key phone numbers, medications, keys, wallet, glasses, tools, etc.  iv. Develop routines - Routines can anchor memories so they do not drift away.    e. Resources: Consider resources for support through the Governors Office of Elderly Affairs (http://goea.[place].gov/), [place] Chapter of the Alzheimers Association (www.alz.org/[place]/), the Family Caregiver [place] (www.caregiver.org), and the American Psychological Association (http://www.apa.org/pi/about/publications/caregivers/consumers/index.aspxconsumers/index.aspx).    · Practice good cognitive/brain health hygiene:  · Engage in regular exercise, which increases alertness and arousal and can improve attention and focus.  Consider lower impact exercises, such as yoga or light walking.  · Get a good nights sleep, as this can enhance alertness and cognition.  · Eat healthy foods and balanced meals. It is notable that research indicates certain nutrients may aid in brain function, such as B vitamins (especially B6, B12, and folic acid), antioxidants (such as vitamins C and E, and beta carotene), and Omega-3 fatty acids. Talk with your physician or nutritionist about whats right for you.   · Keep your brain active. Find activities to stay mentally active, such as reading, games (cards, checkers), puzzles (crosswords, Sudoku, jig saw), crafts (models, woodworking), gardening, or participating in activities in  the community.  · Stay socially engaged. Continue staying active with your family and friends.    · Prepare for the future: Ms. Garcia and caregivers should consider formal arrangements to allow a designated person to make medical and financial decisions for Ms. Garcia, should she become unable to do so.  Options to consider include designating a healthcare proxy, medical and/or financial power of , and completing advanced directives for healthcare decisions and estate planning (e.g., finalizing a will).  If cost is prohibitive, [facility] Legal [employer] (https://OGPlanet.Offerpop/) provides free  for individuals with low income.    COVID-19 Recommendations:  Stay healthy:    Stay home as much as possible, avoid groups or crowds, and keep six feet away from other people   Wash your hands frequently with soap and water   Understand when and how to use a mask who.int/emergencies/diseases/novel-coronavirus-2019/advice-for-public/when-and-how-to-use-masks   Ask a neighbor, friend, or relative to help run errands for you    Ask your pharmacy if they will deliver medications or find a pharmacy with a drive-thru window   Try a new budget friendly recipe choosemyplate.gov/eathealthy/budget/budget-recipes     Stay resourceful   Consider using a bidet (handheld bottle or toilet seat) as an alternative to toilet paper   Consider dry disposable washcloths with no-rinse cleanser or watered down baby shampoo as an alternative to pre-moistened wipes     Stay active:    Try dancing around the house to your favorite music   A pedal exerciser can be ordered online and offers an economical way to do some 'indoor bicycling'   Sit and Be Fit exercise video clips are available free on [email or web address]   Other ideas for activities from the Alzheimer's Association: https://www.alz.org/media/Documents/alzheimers-dementia-activities-at-home-middle-late-b.pdf     Stay engaged:     Consider arranging a call or video chat schedule with friends and family. Ask relatives to send cards or letters by mail. Consider a television schedule of programs the person prefers (cooking shows, Islam services, music programs, old shows/movies).    [employer] Connected program offers free telephone-based educational programs and activities DeliveryChef.in/services/well-connected/   fimoo7qzd3spcxi.com/ offers some free activity ideas   The [place] [employer] Aging offers a free 24/7 [place] Line if you need to talk: [phone]     Please don't hesitate to stay in touch with us and let us know how you are doing. ([phone]).

## 2020-04-23 PROBLEM — R63.5 WEIGHT GAIN, ABNORMAL: Chronic | Status: ACTIVE | Noted: 2020-04-23

## 2020-04-25 DIAGNOSIS — E67.2 HYPERVITAMINOSIS B6: Primary | ICD-10-CM

## 2020-05-03 ENCOUNTER — PATIENT MESSAGE (OUTPATIENT)
Dept: NEUROLOGY | Facility: CLINIC | Age: 75
End: 2020-05-03

## 2020-05-09 PROBLEM — M17.9 OSTEOARTHRITIS OF KNEE: Status: ACTIVE | Noted: 2020-05-09

## 2020-06-10 ENCOUNTER — INITIAL CONSULT (OUTPATIENT)
Dept: NEUROLOGY | Facility: CLINIC | Age: 75
End: 2020-06-10
Payer: MEDICARE

## 2020-06-10 DIAGNOSIS — F01.A0 MAJOR NEUROCOGNITIVE DISORDER, DUE TO VASCULAR DISEASE, WITHOUT BEHAVIORAL DISTURBANCE, MILD: ICD-10-CM

## 2020-06-10 DIAGNOSIS — F33.1 MAJOR DEPRESSIVE DISORDER, RECURRENT, MODERATE: ICD-10-CM

## 2020-06-10 DIAGNOSIS — I10 ESSENTIAL HYPERTENSION: Primary | ICD-10-CM

## 2020-06-10 PROCEDURE — 96132 PR NEUROPSYCHOLOGIC TEST EVAL SVCS, 1ST HR: ICD-10-PCS | Mod: ,,, | Performed by: PSYCHIATRY & NEUROLOGY

## 2020-06-10 PROCEDURE — 96138 PSYCL/NRPSYC TECH 1ST: CPT | Mod: ,,, | Performed by: PSYCHIATRY & NEUROLOGY

## 2020-06-10 PROCEDURE — 96133 NRPSYC TST EVAL PHYS/QHP EA: CPT | Mod: ,,, | Performed by: PSYCHIATRY & NEUROLOGY

## 2020-06-10 PROCEDURE — 96138 PR PSYCH/NEUROPSYCH TEST ADMIN/SCORING, BY TECH, 2+ TESTS, 1ST 30 MIN: ICD-10-PCS | Mod: ,,, | Performed by: PSYCHIATRY & NEUROLOGY

## 2020-06-10 PROCEDURE — 96139 PSYCL/NRPSYC TST TECH EA: CPT | Mod: ,,, | Performed by: PSYCHIATRY & NEUROLOGY

## 2020-06-10 PROCEDURE — 96139 PR PSYCH/NEUROPSYCH TEST ADMIN/SCORING, BY TECH, 2+ TESTS, EA ADDTL 30 MIN: ICD-10-PCS | Mod: ,,, | Performed by: PSYCHIATRY & NEUROLOGY

## 2020-06-10 PROCEDURE — 96132 NRPSYC TST EVAL PHYS/QHP 1ST: CPT | Mod: ,,, | Performed by: PSYCHIATRY & NEUROLOGY

## 2020-06-10 PROCEDURE — 96133 PR NEUROPSYCHOLOGIC TEST EVAL SVCS, EA ADDTL HR: ICD-10-PCS | Mod: ,,, | Performed by: PSYCHIATRY & NEUROLOGY

## 2020-06-10 PROCEDURE — 99499 UNLISTED E&M SERVICE: CPT | Mod: S$PBB,,, | Performed by: PSYCHIATRY & NEUROLOGY

## 2020-06-10 PROCEDURE — 99499 NO LOS: ICD-10-PCS | Mod: S$PBB,,, | Performed by: PSYCHIATRY & NEUROLOGY

## 2020-06-10 NOTE — PROGRESS NOTES
NEUROPSYCHOLOGICAL EVALUATION    Referral Information  Name: Christine Garcia  MRN: [id number]  : 1945  Age: 74 y.o.  Race: White  Gender: female  Dominant Hand: Right  Referring Provider: Jemal Marquez Md  [id number] [place] [address]  Billing: See below for details as coding/billing has changed   eferral Reason/Medical Necessity: Neuropsychological evaluation to assess current cognitive functioning, aid in differential diagnosis, and provide treatment recommendations in the context of reported cognitive changes.  Consent/Emergency Plan: The patient expressed an understanding of the purpose of the evaluation and consented to all procedures. I informed the patient of limits to confidentiality and discussed an emergency plan. She provided consent to speak with her daughter, who was present during the initial interview.    SUMMARY/TREATMENT PLAN   Results from the interview indicate the following diagnoses and treatment plan recommendations. The patient may require help from family to follow a treatment plan.    Diagnoses  Problem List Items Addressed This Visit        Neuro    Major neurocognitive disorder, due to vascular disease, without behavioral disturbance, mild    Overview     Ms. Garcia's family reported gradual cognitive changes (repeating questions) over the last approximately two years, with an increase in difficulty after her 's stroke in 2019. At this time, her children have taken over financial management, shopping, household tasks, and reminders to take medications. Some of this assistance is due to increased stress and symptoms of depression. Brief cognitive screening was within normal limits during a neurology visit on 3/31/2020 (modified MOCA = 20/22). Neuropsychological assessment suggested difficulty with memory and executive functioning that are consistent with a vascular etiology for cognitive change.         Current Assessment & Plan     At this time,  diagnosis is most consistent with major vascular neurocognitive disorder, mild, without behavioral disturbance. Ms. Garcia has memory difficulty and changes in her ability to complete some instrumental activities of daily living without assistance. She also has a history of depression and increased stressors, which are likely contributing to memory changes.    Recommendations:   Neuropsychology Follow-up: Follow-up in 12-18 months to assess for cognitive change.    · Medical Follow-up: Continue usual follow up for current active medical issues.     Recommendations for Ms. Garcia:  a. Executive Functioning:  i. Dont attempt to multi-task.  Separate tasks so that each can be completed one at a time.  ii. Consider using a calendar/day planner, as that may be effective to help you plan and stay on track.  Color-coding specific tasks by importance may add additional benefit to your planner.  iii. Break down large projects into smaller tasks and write down the steps to completing the task.  Taking notes while reading can help with recall.  b. Storing Information: Use the below strategies to help you further enhance how information is stored.  i. Rehearse - Immediately after seeing/hearing something, try to recall it.  Wait a few minutes, then check again.  Gradually lengthen the intervals between rehearsals.  ii. Repetition of learned material is critical to ensure storage of information to be learned. Self-test at home to ensure learning.  iii. Write down important information to improve your attention and focus and to have something to look back on when you need to recall it.  iv. Make sure the person doesnt rattle off, but presents in a clear, logical, and unhurried manner.   c. Recalling Information:  i. Jog your memory - Lose something?  Think back to when you last had it.  What did you do next?  And after that?  Mentally walk yourself through each activity that followed.  Prodding your memory this way may enable  you to recall the location of the missing item.  ii. Use a cue - Symbolic reminders (the proverbial string around the finger) are helpful.  So too are memos, timers, calendar notes, etc.--keep them in visible, appropriate places.  iii. Get organized - Have fixed locations for all important papers, key phone numbers, medications, keys, wallet, glasses, tools, etc.  iv. Develop routines - Routines can anchor memories so they do not drift away.    d. Resources: Consider resources for support through the Governors Office of Elderly Affairs (http://goea.[place].gov/), [facility] of the Alzheimers Association (www.alz.org/[facility]/), the Family Caregiver [place] (www.caregiver.org), and the American Psychological Association (http://www.apa.org/pi/about/publications/caregivers/consumers/index.aspxconsumers/index.aspx).    · Practice good cognitive/brain health hygiene:  · Engage in regular exercise, which increases alertness and arousal and can improve attention and focus.  Consider lower impact exercises, such as yoga or light walking.  · Get a good nights sleep, as this can enhance alertness and cognition.  · Eat healthy foods and balanced meals. It is notable that research indicates certain nutrients may aid in brain function, such as B vitamins (especially B6, B12, and folic acid), antioxidants (such as vitamins C and E, and beta carotene), and Omega-3 fatty acids. Talk with your physician or nutritionist about whats right for you.   · Keep your brain active. Find activities to stay mentally active, such as reading, games (cards, checkers), puzzles (crosswords, Sudoku, jig saw), crafts (models, woodworking), gardening, or participating in activities in the community.  · Stay socially engaged. Continue staying active with your family and friends.    · Prepare for the future: Ms. Garcia and caregivers should consider formal arrangements to allow a designated person to make medical and financial decisions  for Ms. Garcia, should she become unable to do so.  Options to consider include designating a healthcare proxy, medical and/or financial power of , and completing advanced directives for healthcare decisions and estate planning (e.g., finalizing a will).  If cost is prohibitive, [facility] (https://PictureMenu.org/) provides free  for individuals with low income.              Psychiatric    Major depressive disorder, recurrent, moderate    Overview     Ms. Garcia has a pre-existing history of depression for which she was prescribed medication. Symptoms increased following her 's stroke, and her daughter expressed concern that she was not taking her medication as prescribed.         Current Assessment & Plan     · Behavioral/Neuropsychiatric Symptoms:   · Medication Management:   · Continued medication management is recommended to treat depressed mood.    · Psychology/Therapy: Consultation with a therapist to address caregiver stress and depressed mood.            Cardiac/Vascular    Essential hypertension - Primary        Thank you for allowing me to participate in Ms. Garcia's care.  If you have any questions, please contact me at [phone].Melissa Zhao, Ph.D., ABPPBoard Certified in Clinical NeuropsychologyOchsner Health  Department of Neurology    HISTORY OF PRESENT ILLNESS AND CURRENT SYMPTOMS     Ms. Garcia completed an initial interview via telehealth on 4/2/2020. The background information is taken from that interview, with updates.    Records noted Ms. Garcia initially visited neurology in March 2020. During that visit, her daughter reported 6-7 months of cognitive concerns, including repeating questions and stories. Her  had a stroke in October 2019, which has increased stress and exacerbated cognitive difficulties. Her daughter took over managing finances and reminds her to take her medications. Performance on a brief cognitive screening measure  "was within normal limits (modified Brijesh Cognitive Assessment [MoCA] = 20/22). She had difficulty with auditory attention and delayed recall (2/5 words, 3/3 words with cueing). Between the interivew and testing, Ms. Garcia and her  fell on 4/15/2020. She hit her head but did not report any symptoms.    During the interview, Ms. Garcia reported some disorientation to date due to a change in routine but did not agree with her daughter's report of her being very forgetful.  She also reported adjusting to a change in her previous routine due to her 's stroke in October 2019 and serving as his primary caregiver.  She said her whole focus is on him but denied feeling depressed mood or feeling burnout.  Again, in contrast, her daughter has noticed that she no longer participates in activities for herself and has difficulty keeping up with activities of daily living in the context of caregiving for her .  Her daughter also noted that she and her siblings had concern for cognitive changes before October 2019, and those have not subsided with the increased stress.    Cognitive Symptoms:   Type/Examples:   · Attention: None reported.  · Mental Speed: Ms. Garcia said she is "laid back." Her daughter said she has noticed some slowing.   · Memory: Her daughter said she asks questions repetitively, even within a few hours. Ms. Garcia said she may forget conversations if they are not important. She said she stays on top of appointments, but her daughter indicated she had to purchase a calendar to place on the refrigrator.   · Language: Ms. Garcia reported longstanding word finding difficulty. No receptive language difficulty.  · Visuospatial/Perceptual: None reported.  · Executive Functioning:Ms. Garcia reported she needs to use a calendar. Her daughter said she is less organized than she used to be.   Onset:  Her daughter noticed gradual changes starting in late 2017.    Course: Concern for " cognitive change over time, with increased difficulty in the context of her 's stroke in October 2019.    Current Functional Status/Needs:  ADLs  Self-Care Eating Safety Other   Independent Independent Independent      Instrumental IADLs:   Driving Medications/Health Household Finances   No concerns noted. She has difficulty remembering to take her medication but also has had times when she did not want to take it. Her daughter gives reminders and manages her father's medication.  Ms. Garcia has help with household tasks after her 's stroke. Her daughter said she was not keeping her home as neatly prior to October 2019. Her daughter helps with laundry and grocery shopping. Family took over after October 2019, but her daughter said they had concern for mistakes prior to that time.       Psychiatric/Behavioral Symptoms:  Mood:  Depression/Dysphoria Anxiety/Fearfulness Irritability   Ms. Garcia said she feels sad at times. Her daughter said she has noticed depressed mood and lack of self care. Her daughter indicated friends and medical providers have noticed changes.  None reported. Her daughter noted some irritability due to stress.     Behavior:  Agitation/Resistance Delusions/Paranoia Hallucinations   Resistance to taking medication but will accept feedback and support from family without defensiveness. None reported None reported     Apathy/Motivation Repetitive/Restlessness Other   Her daughter noted reduced engagement in pleasurable activities. She asks repetitive questions and tells stories repeatedly.      Neurovegetative:  Sleep/Nighttime  [employer]et[employer]   She sleeps well. Her daughter reported changes in diet. She eats less when depressed but eats sweets and coke. Her daughter is doing the grocery shopping. She is not physically active on a regular basis right now.     Suicidal/Homicidal Ideation: None reported.    Physical Symptoms: No changes in hearing or vision. No motor changes.      PERTINENT BACKGROUND INFORMATION   SOCIAL HISTORY    · Family Status:  approximately 50 years  · Current Living Situation: Lives with . Daughter lives nearby and checks on them frequently.  · Primary Source of Support: family, friends  · Daily Activities: Limited due to caregiving and COVID-19. 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 She continues to [employer].  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  Family Psychiatric History: Depression in the family and possible bipolar disorder    MEDICAL STATUS  Patient Active Problem List   Diagnosis    Essential hypertension    Dyslipidemia    Obesity    Patent foramen ovale    Major neurocognitive disorder, due to vascular disease, without behavioral disturbance, mild    Major depressive disorder, recurrent, moderate    Weight gain, abnormal    Osteoarthritis of knee    Thoracic aorta atherosclerosis     Past Medical History:   Diagnosis Date    Dyslipidemia 10/2/2015    Hyperlipidemia     Hypertension     minimal    Migraines     Obesity 10/2/2015    Patent foramen ovale 10/2/2015     Past Surgical History:   Procedure Laterality Date    COLONOSCOPY N/A 1/18/2017    Procedure: COLONOSCOPY;  Surgeon: Gerber Naylor MD;  [facility];  Service: Endoscopy;  Laterality: N/A;    HYSTERECTOMY      left elbow      left knee       Updated/Relevant Neurologic History:  · Falls: Mechanical fall in spring 2020  · TBI: None reported  · Seizures: None  reported  · Stroke: None reported  · Movement Concerns: None reported  · Referral Diagnosis: Memory difficulties    Recent Labs and Imaging  Lab Results   Component Value Date    [id number] 798 04/18/2020     No results found for: RPR  Lab Results   Component Value Date    FOLATE >20.0 04/18/2020     Lab Results   Component Value Date    TSH 1.500 06/13/2020     No results found for: LABA1C, HGBA1C  No results found for: HIV1X2, OXT66AGTT     Head CT:  4/15/2020  FINDINGS:  Chronic involutional changes are noted.  Chronic white matter microischemic changes are noted.  There is intracranial atherosclerosis.  Chronic basal ganglia lacunar infarcts are demonstrated bilaterally.    No acute intracranial hemorrhage, extra-axial fluid collection, hydrocephalus, mass effect, midline shift is noted.  No large vessel territory acute ischemia is identified.  Visualized paranasal sinuses are clear.  Visualized mastoid air cells are clear.  No acute displaced calvarial fracture is identified.    Brain MRI:  5/29/2018:  Impression       1. No acute intracranial abnormality is visualized.  2. Age-appropriate generalized involutional changes are seen along with findings most compatible with mild chronic microvascular ischemic changes.  3. Additional findings and details as above.       Current Outpatient Medications:     aspirin (ECOTRIN) 81 MG EC tablet, Take 81 mg by mouth once daily. , Disp: , Rfl:     atorvastatin (LIPITOR) 10 MG tablet, Take 1 tablet (10 mg total) by mouth once daily., Disp: 90 tablet, Rfl: 3    cycloSPORINE (RESTASIS) 0.05 % ophthalmic emulsion, Place 1 drop into both eyes 2 (two) times daily., Disp: , Rfl:     FLUoxetine 20 MG capsule, Take 20 mg by mouth once daily., Disp: , Rfl:     meloxicam (MOBIC) 15 MG tablet, Take 1 tablet (15 mg total) by mouth once daily. (Patient not taking: Reported on 6/15/2020), Disp: 30 tablet, Rfl: 2    multivitamin capsule, Take 1 capsule by mouth once daily., Disp: ,  "Rfl:     nystatin (MYCOSTATIN) 100,000 unit/mL suspension, Take 6 mLs (600,000 Units total) by mouth 3 (three) times daily. Swish and swallow. (Patient not taking: Reported on 6/15/2020), Disp: 120 mL, Rfl: 0    vitamin D (VITAMIN D3) 1000 units Tab, Take 4,000 Units by mouth once daily., Disp: , Rfl:   No current facility-administered medications for this visit.     Facility-Administered Medications Ordered in Other Visits:     omnipaque 350 iohexol 80 mL, 80 mL, Intravenous, ONCE PRN, BRENDEN Bailey Jr., MD     Updated/Relevant Psychiatric History: Depression in the past for which she was prescribed medication and engaged in therapy.    No substance use concerns.    MENTAL STATUS AND OBSERVATIONS:  APPEARANCE: Casually dressed and adequate grooming/hygiene.   ALERTNESS/ORIENTATION: Attentive and alert. Fully oriented to time and place.   GAIT: Unremarkable  MOTOR MOVEMENTS/MANNERISMS: Not assessed  SPEECH/LANGUAGE: Normal in rate, rhythm, tone, and volume. No significant word finding difficulty noted. Expressive and receptive language was normal.  STATED MOOD/AFFECT: The patients stated mood was "fine." Affect was congruent with stated mood.   INTERPERSONAL BEHAVIOR: Rapport was quickly and easily established   SUICIDALITY/HOMICIDALITY: Denied  HALLUCINATIONS/DELUSIONS: None evidenced or endorsed  THOUGHT PROCESSES/INSIGHT: Thoughts seemed logical and goal-directed.     QUESTIONNAIRE RESPONSES (4/6/2020)    Questionnaires were completed via telephone with a psychometrist and reviewed and incorporated by the neuropsychologist.    IADL 4/6/2020   Ability to Use Telephone Operates telephone on own initiative, looks up and dials numbers   Shopping Takes care of all shopping needs independently   Food Preparation Heats and serves prepared meals or prepares meals but does not maintain adequate diet   Housekeeping Performs light daily tasks, but cannot maintain acceptable level of cleanliness   Laundry Does personal " laundry completely   Mode of Transportation Travels independently on public tranportation or drives own car   Responsibility for Own Medications Takes responsibility if medication is prepared in advance in separate dosages   Ability to Handle Finances Manages day-to-day purchases, but needs help with banking, major purchases, etc       NPIQ RFS 4/6/2020   WHO IS FILLING OUT FORM? Caregiver   Does this patient have false beliefs, such as thinking that others are stealing from him/her or planning to harm him/her in some way? No   Does this patient have hallucinations such as false visions or voices? Kim she/he seem to hear or see things that are not present? No   Is the patient resistive to help from others at times, or hard to handle? No   Does the patient seem sad or say that he/she is depressed? Yes   Depression/Dysphoria Severity 3   Depression/Dysphoria Distress 6   Does the patient become upset when  from you? Does he/she have any other signs of nervousness such as shortness of breath, sighing, being unable tor elax, or feeling excessively tense? No   Does the patient appear to feel good or act excessively happy? No   Does this patient seem less interested in his/her usual activities or in the activities and plans of others? Yes   Apathy/Indifference Severity 3   Apathy/Indifference Distress 6   Does this patient seem to act cumpolsively, for example, talking to strangers as if she/he knows them, or saying things that may hurt people's feelings? No   Is the patient impatient and cranky? Does he/she have difficulty coping with delays or waiting for planned activities? No   Does the patient engage in repetitive activities such as pacing around the house, handling buttons, wrapping string, or doing other things repeatedly? No   Does this patient awaken you during the night, rise too early in the morning, or take excessive naps during the day? No   Has the patient lost or gained weight, or had a change in  the type of food he/she likes? Yes   Apetitie/Eating Severity 3   Apetite/Eating Distress 6   NPI Total Severity Score 9   NPI Total Distress Score 18     TESTING NOTE: Due to COVID-related safety precautions, this evaluation was conducted in the office via a hybrid telemedicine video platform (HIPAA-Compliant Zoom) and in-person administration. Test instructions were administered by the evaluator using audiovisual technology. Test stimuli that required physical manipulation were presented to the evaluee in person. The standard administration of evaluation procedures involves in-person, face-to-face methods for all testing. The impact of applying non-standard administration methods has been evaluated only in part by scientific research. While every effort was made to simulate standard assessment practices, the diagnostic conclusions and recommendations for treatment provided in this report are being advanced with these limitations considered.     TEST TAKING BEHAVIOR and VALIDITY: Ms. Garcia was cooperative with test procedures and did not require repetition or redirection. Scores on stand-alone and embedded performance validity measures were within normal limits.  The current results, therefore, are likely a valid reflection of the patient's current functioning.     PROCEDURES/TESTS ADMINISTERED:  In addition to performing a review of pertinent medical records, reviewing limits to confidentiality, conducting a clinical interview, and explaining procedures, the following measures were administered: MoCA; MSVT; Test of Premorbid Functioning; Wechsler Adult Intelligence Scale, Fourth Edition (WAIS-IV) selected subtests; Wechsler Memory Scale, Fourth Edition (WMS-IV), selected subtests; Neuropsychological Assessment Battery (NAB), selected subtests; Verbal fluency tests (FAS & animal naming; Lu et al., 2004 norms); Ellsworth Verbal Learning Test, Revised (HVLT-R; Form 1); Theresa Making Test, parts A and B (Lu levine  al., 2004 norms); [place] Card Sorting Test-128 (WCST- 128; computer version), Oscar Complex Figure Test, Geriatric Depression Scale (GDS-30); Lees Anxiety Inventory (TABATHA). Manual norms were used unless otherwise indicated.       TEST RESULTS    PREMORBID FUNCTIONING Raw Score Standardized Score Percentile/CP Descriptor   TOPF simple dem. eFSIQ - 109 73 Average   INTELLECTUAL FUNCTIONING Raw Score Standardized Score Percentile/CP Descriptor   WAIS-IV        Visual Puzzles 12 11 63 Average   Digit Span 21 8 25 Average         DS Forward 7 7 16 Low Average         DS Backward 6 8 25 Average         DS Sequence 8 11 63 Average         Longest Digit Forward 5 - - -         Longest Digit Backward 3 - - -         Longest Digit Sequence 6 - - -   Coding 43 9 37 Average   COGNITIVE SCREENING Raw Score Standardized Score Percentile/CP Descriptor   MoCA 27 - - WNL   Orientation - Place 2/2 - - -   Orientation - Date 4/4 - - -   LANGUAGE FUNCTIONING Raw Score Standardized Score Percentile/CP Descriptor   TOPF Word Reading 38 98 45 Average   NAB Naming 31 58 79 High Average   FAS 17 3 1 Exceptionally Low    Animal Naming 17 45 31 Average   VISUOSPATIAL FUNCTIONING Raw Score Standardized Score Percentile/CP Descriptor   WASI-IV Visual Puzzles 12 11 63 Average   RCFT Copy 23.5 - 1 Exceptionally Low    RCFT Time to Copy 143 - >16 WNL   LEARNING & MEMORY Raw Score Standardized Score Percentile/CP Descriptor   HVLT-R        Total Immediate 23 48 42 Average   Delayed Recall 3 29 2 Below Average   Retention % 50 31 3 Below Average   Hits 12 - - -   False Positives 1 - - -   Discrimination  11 53 62 Average   WMS-IV Subtests        LM I 24 7 16 Low Average   LM II 5 4 2 Below Average   LM Recognition 17 - 26-50 Low Average   ATTENTION/WORKING MEMORY Raw Score Standardized Score Percentile/CP Descriptor   WAIS-IV Digit Span 21 8 25 Average         DS Forward 7 7 16 Low Average         DS Backward 6 8 25 Average         DS Sequence 8 11  63 Average         Longest Digit Forward 5 - - -         Longest Digit Backward 3 - - -         Longest Digit Sequence 6 - - -   MENTAL PROCESSING SPEED Raw Score Standardized Score Percentile/CP Descriptor   WAIS-IV Coding 43 9 37 Average   TMT A  42 43 24 Low Average   TMT A errors 0 - - -   EXECUTIVE FUNCTIONING Raw Score Standardized Score Percentile/CP Descriptor   TMT B 91 45 31 Average   TMT B errors 1 - - -   MOOD & PERSONALITY Raw Score Standardized Score Percentile/CP Descriptor   GDS-30 10 - - Mild   MANNY-7 0 - - WNL     TESTING SUMMARY: Ms. Garcia's premorbid intellectual abilities were estimated to be in the average range based on word reading and demographic information. She demonstrated intact attention, working memory, and processing speed. Executive functioning was notable for intact set-shifting but difficulty with organization on a drawing task and reduced letter fluency. Language was intact, as was visuospatial functioning (with the exception of a complex figure). Learning was intact, but recall of learned information was below expectation compared to premorbid estimates. Recognition was intact for information given with and without context. Ms. Garcia endorsed mild depression and minimal anxiety on self-report measures.    BILLING  Service Description CPT Code Minutes Units   Test Evaluation Services --  --   Neuropsychological testing evaluation services by physician [phone]   Each additional hour by physician [phone]   Test Administration and Scoring --  --   Psychological or neuropsychological test administration and scoring by physician [id number]  0   Each additional 30 minutes by physician [id number]  0   Psychological or neuropsychological test administration and scoring by technician [phone]   Each additional 30 minutes by technician [phone]

## 2020-06-10 NOTE — Clinical Note
Hi,    I met with the family today for feedback, and they had questions about current medications and follow up. I told them I would let you know. The questions are:    1. Should she be taking a blood thinner given that her imaging showed lacunar infarcts and microvascular ischemic changes?  2. Can she be referred to Ashley Le for follow up neurological care to keep care on the [facility]?  3. Does she need to see cardiology?    Please send responses to her daughter, Aliza.    Thank you.   Melissa Peter

## 2020-06-13 PROBLEM — I70.0 THORACIC AORTA ATHEROSCLEROSIS: Chronic | Status: ACTIVE | Noted: 2020-06-13

## 2020-06-16 PROBLEM — F01.A0 MAJOR NEUROCOGNITIVE DISORDER, DUE TO VASCULAR DISEASE, WITHOUT BEHAVIORAL DISTURBANCE, MILD: Status: ACTIVE | Noted: 2020-03-31

## 2020-06-16 NOTE — ASSESSMENT & PLAN NOTE
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 Ms. Garcia has memory difficulty and changes in her ability to complete some instrumental activities of daily living without assistance. She also has a history of depression and increased stressors, which are likely contributing to memory changes.    Recommendations:   Neuropsychology Follow-up: Follow-up in 12-18 months to assess for cognitive change.    · Medical Follow-up: Continue usual follow up for current active medical issues.     Recommendations for Ms. Garcia:  a. Executive Functioning:  i. Dont attempt to multi-task.  Separate tasks so that each can be completed one at a time.  ii. Consider using a calendar/day planner, as that may be effective to help you plan and stay on track.  Color-coding specific tasks by importance may add additional benefit to your planner.  iii. Break down large projects into smaller tasks and write down the steps to completing the task.  Taking notes while reading can help with recall.  b. Storing Information: Use the below strategies to help you further enhance how information is stored.  i. Rehearse - Immediately after seeing/hearing something, try to recall it.  Wait a few minutes, then check again.  Gradually lengthen the intervals between rehearsals.  ii. Repetition of learned material is critical to ensure storage of information to be learned. Self-test at home to ensure learning.  iii. Write down important information to improve your attention and focus and to have something to look back on when you need to recall it.  iv. Make sure the person doesnt rattle off, but presents in a clear, logical, and unhurried manner.   c. Recalling Information:  i. Jog your memory - Lose something?  Think back to when you last had it.  What did you do next?  And after that?  Mentally walk yourself through each activity that followed.  Prodding your memory this  way may enable you to recall the location of the missing item.  ii. Use a cue - Symbolic reminders (the proverbial string around the finger) are helpful.  So too are memos, timers, calendar notes, etc.--keep them in visible, appropriate places.  iii. Get organized - Have fixed locations for all important papers, key phone numbers, medications, keys, wallet, glasses, tools, etc.  iv. Develop routines - Routines can anchor memories so they do not drift away.    d. Resources: Consider resources for support through the Governors Office of Elderly Affairs (http://goea.[place].gov/), [facility] [place] Chapter of the Alzheimers Association (www.alz.org/[facility] [place]/), the Family Caregiver [place] (www.caregiver.org), and the American Psychological Association (http://www.apa.org/pi/about/publications/caregivers/consumers/index.aspxconsumers/index.aspx).    · Practice good cognitive/brain health hygiene:  · Engage in regular exercise, which increases alertness and arousal and can improve attention and focus.  Consider lower impact exercises, such as yoga or light walking, in conjunction with your treating provider's recommendations.  · Get a good nights sleep, as this can enhance alertness and cognition.  · Eat healthy foods and balanced meals. It is notable that research indicates certain nutrients may aid in brain function, such as B vitamins (especially B6, B12, and folic acid), antioxidants (such as vitamins C and E, and beta carotene), and Omega-3 fatty acids. Talk with your physician or nutritionist about whats right for you.   · Keep your brain active. Find activities to stay mentally active, such as reading, games (cards, checkers), puzzles (crosswords, Sudoku, jig saw), crafts (models, woodworking), gardening, or participating in activities in the community.  · Stay socially engaged. Continue staying active with your family and friends.    · Prepare for the future: Ms. Garcia and caregivers should consider formal  arrangements to allow a designated person to make medical and financial decisions for Ms. Garcia, should she become unable to do so.  Options to consider include designating a healthcare proxy, medical and/or financial power of , and completing advanced directives for healthcare decisions and estate planning (e.g., finalizing a will).  If cost is prohibitive, [facility] Legal Services (https://[facility].org/) provides free  for individuals with low income.

## 2020-06-17 ENCOUNTER — OFFICE VISIT (OUTPATIENT)
Dept: NEUROLOGY | Facility: CLINIC | Age: 75
End: 2020-06-17
Payer: MEDICARE

## 2020-06-17 DIAGNOSIS — F33.1 MAJOR DEPRESSIVE DISORDER, RECURRENT, MODERATE: ICD-10-CM

## 2020-06-17 DIAGNOSIS — F01.A0 MAJOR NEUROCOGNITIVE DISORDER, DUE TO VASCULAR DISEASE, WITHOUT BEHAVIORAL DISTURBANCE, MILD: Primary | ICD-10-CM

## 2020-06-17 PROCEDURE — 99499 NO LOS: ICD-10-PCS | Mod: 95,,, | Performed by: PSYCHIATRY & NEUROLOGY

## 2020-06-17 PROCEDURE — 99499 UNLISTED E&M SERVICE: CPT | Mod: 95,,, | Performed by: PSYCHIATRY & NEUROLOGY

## 2020-06-17 NOTE — PROGRESS NOTES
NEUROPSYCHOLOGICAL EVALUATION FEEDBACK    Telemedicine:   The patient location is: [place], [place]  The provider location is: [place], [place]  The chief complaint leading to consultation/medical necessity is: Feedback from neuropsychological assessment  Visit type: Virtual visit with synchronous audio and video  Total time spent with patient: (62 minutes, [id number] associated with testing on 6/10/2020)  Each patient to whom he or she provides medical services by telemedicine is:  (1) informed of the relationship between the physician and patient and the respective role of any other health care provider with respect to management of the patient; and (2) notified that he or she may decline to receive medical services by telemedicine and may withdraw from such care at any time.  Consent/Emergency Plan: The patient expressed an understanding of the purpose of the evaluation and consented to all procedures. I informed the patient of limits to confidentiality and discussed an emergency plan.    Christine Garcia attended a feedback session today, accompanied by her daughter and .  We discussed the results of the neuropsychological evaluation.  I provided Ms. Garcia with a copy of the evaluation report via mail and gave time to discuss questions and concerns.    Melissa Zhao, Ph.D., ABPP  Board Certified in Clinical Neuropsychology  [facility] - Department of Neurology

## 2020-06-25 ENCOUNTER — TELEPHONE (OUTPATIENT)
Dept: NEUROLOGY | Facility: CLINIC | Age: 75
End: 2020-06-25

## 2020-06-26 ENCOUNTER — OFFICE VISIT (OUTPATIENT)
Dept: NEUROLOGY | Facility: CLINIC | Age: 75
End: 2020-06-26
Payer: MEDICARE

## 2020-06-26 VITALS
WEIGHT: 177 LBS | TEMPERATURE: 97 F | BODY MASS INDEX: 31.36 KG/M2 | HEIGHT: 63 IN | SYSTOLIC BLOOD PRESSURE: 127 MMHG | HEART RATE: 60 BPM | DIASTOLIC BLOOD PRESSURE: 74 MMHG

## 2020-06-26 DIAGNOSIS — F33.1 MAJOR DEPRESSIVE DISORDER, RECURRENT, MODERATE: ICD-10-CM

## 2020-06-26 DIAGNOSIS — F01.A0 MAJOR NEUROCOGNITIVE DISORDER, DUE TO VASCULAR DISEASE, WITHOUT BEHAVIORAL DISTURBANCE, MILD: Primary | ICD-10-CM

## 2020-06-26 PROCEDURE — 99483 PR ASSMT/CARE PLANNING, PT W/COGN IMPAIRMENT: ICD-10-PCS | Mod: S$PBB,,, | Performed by: PSYCHIATRY & NEUROLOGY

## 2020-06-26 PROCEDURE — 99499 UNLISTED E&M SERVICE: CPT | Mod: S$PBB,,, | Performed by: PSYCHIATRY & NEUROLOGY

## 2020-06-26 PROCEDURE — 99214 OFFICE O/P EST MOD 30 MIN: CPT | Mod: PBBFAC,PN | Performed by: PSYCHIATRY & NEUROLOGY

## 2020-06-26 PROCEDURE — 99483 ASSMT & CARE PLN PT COG IMP: CPT | Mod: S$PBB,,, | Performed by: PSYCHIATRY & NEUROLOGY

## 2020-06-26 PROCEDURE — 99499 NO LOS: ICD-10-PCS | Mod: S$PBB,,, | Performed by: PSYCHIATRY & NEUROLOGY

## 2020-06-26 PROCEDURE — 99999 PR PBB SHADOW E&M-EST. PATIENT-LVL IV: ICD-10-PCS | Mod: PBBFAC,,, | Performed by: PSYCHIATRY & NEUROLOGY

## 2020-06-26 PROCEDURE — 99999 PR PBB SHADOW E&M-EST. PATIENT-LVL IV: CPT | Mod: PBBFAC,,, | Performed by: PSYCHIATRY & NEUROLOGY

## 2020-06-26 RX ORDER — DONEPEZIL HYDROCHLORIDE 10 MG/1
10 TABLET, FILM COATED ORAL NIGHTLY
Qty: 30 TABLET | Refills: 11 | Status: SHIPPED | OUTPATIENT
Start: 2020-06-26 | End: 2021-07-27 | Stop reason: SDUPTHER

## 2020-06-26 NOTE — PATIENT INSTRUCTIONS
Start aricept 5 mg (half tablet) nightly for 1 week. Then increase to 10 mg nightly thereafter. Watch for stomach upset or nightmares.        To prevent further memory loss, some of the best preventative measures are following a healthy diet, getting regular exercise, and ensuring good sleep habits.  Approximately 30 to 45 minutes of brisk physical activity (brisk walking, swimming, stationary bicycle, etc.) 5 days a week has been shown to improve function in vascular dementias, and can lower the risk of stroke and slow progression of memory loss.     A Mediterranean style diet, or the DASH diet with lots of fresh fruits and vegetables, whole grains, more fish and chicken, less red meat, less dairy, less processed foods is also beneficial. For more information see:    https://www.rush.[facility]/news/diet-may-help-prevent-alzheimers      Minimize or eliminate the use of alcohol, and discuss any prescription medications you might be taking with your doctor to avoid medications which can cause sedation or worsen cognitive function.     Establish a regular, consistent sleep pattern and practice good sleep hygiene.  Avoid screen time (computer, TV, smartphones or tablets) or heavy meals for at least an hour before bedtime, and avoid caffeine or stimulants after 2 PM. Exercise earlier in the day or mornings and keep your sleeping environment comfortable.      Socializing with friends and family and staying both mentally and physically active is also very important. Continue with hobbies or activities that are engaging and practice activities that are mentally stimulating (word puzzles, Sudoku, etc) and stay active in the community.    Please look into the following websites, to help you find resources including day programs, caregivers and caregiver support, legal questions, support groups, etc.    [facility] ([facility])  [facility] - [address] - [address]    Group meetings facilitated by Otto Kelly MA, TYSHAWN [phone]    ADDRESS  Mailing Address:  [address]  [place] [address] Street Address:  [address] [place] [address]   Web Address:  [email or web address]       Services offered at this location:  Chore, Congregate Meals, Home Delivered Meals, Homemaker, Information and Assistance, Legal, Material Aid, Medical Alert, Medication Management, NFCSP Information and Assistance, NFCSP In-Home Respite, NFCSP Material Aid, NGCSP Personal Care , NFCSP Public Education, NFCSP Sitter Service, NFCSP Support Groups, Nutrition Counseling, Nutrition Education, Outreach, Recreation, Transportation, Utility Assistance, Wellness, Area Agency on Aging, [place] on Aging        Website for the Alzheimer's Association:  http://www.alz.org/    Excellent resources for finding community resources   Action plan navigator and online tools   Call [phone] for 24/7 helpline    http://www.communityresourcefinder.org    [facility] Office of Aging and Adult Services:  Http://www.ldh.[place].[facility]/index.cfm/subhome/12/n/7    Peace With Dementia: http://careNexmo.[employer]/    Website for [facility] Agency on Ageing: http://goea.[place].[facility]/index.cfm?md=leticia&tmp=category&catID=38&nid=24&ssid=0&startIndex=1      PATIENT/FAMILY RESOURCES:  1. Alzheimer's Association                                                                 http://www.alz.org  2. Alzheimer's Foundation of [place]                                               http://www.alzfdn.org  3. The Alzheimer's Disease Education and Referral Center             https://www.jerson.nih.gov/alzheimers  4. Lewy Body Dementia Association                                                  http://www.lbda.org  5. National [place] of Mental Health                                                 http://www.nimh.nih.gov  6. National [place] on Mental Illness                                                 http://www.emanuel.org  7. Mental Health [place]                                                                   http://www.mentalhealthamerica.net  8. Mental Health.gov                                                                           http://www.mentalhealth.gov  9. National [place] for Behavioral Health                                          http://www.thenationalcouncil.org  10. Substance Abuse and Mental Health Services Administration   http://www.samhsa.gov  11. Licensed local counselors, social workers, psychiatrists, psychologists - one starting point is the Psychology Today website, therapist finder

## 2020-06-26 NOTE — PROGRESS NOTES
Date: 2020    Patient ID: Christine Garcia is a 74 y.o. female.    Chief Complaint: Dementia      History of Present Illness:  Ms. Garcia is a 74 y.o. female who presents for establishing care for memory trouble. The patient was accompanied by her daughter who also contributed to the following history. She was preivously seen by Dr. Marquez on the [facility] via telemedicine. I have reviewed his note and summarized below as well.     The daughter notes short term memory trouble for years but this was mild at first. This seems to get worse with stress--like when her mother  in 2017. She notes repetitive questions and stories. The patient is unaware of issues with her memory other than mild forgetfulness. The daughter manages her finances since the fall of . The patient's  had a stroke in Oct 2019 and her memory seemed to decline more after that. 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 They noticed significant worsening after her 's stroke. She is repeating conversations throughout the day. Her friends have been worried. The order take out a lot. Household chores for example have taken a backburner since she is caring for her . No major word finding trouble.     She has been driving (before her knee surgery). No trouble driving. She enjoys needlepoint work. She does not feel depressed. She still finds enjoyment with activities. She is on prozac but she doesn't like to take medication.     Dr. Marquez conducted a MOCA in 3/2020 and this was a . MRI brain from 2018 showed age appropriate atrophy. Vitamin levels were ordered. B6 was very high. She is on a supplement taking deborah doses of vitamins. No numbness/tingling. Vitamin D was low. She takes vitamin D supplements. CT head in 2020 showed frontal predominant atrophy.     She underwent neuropsych testing with Dr. Zhao on 2020. This was consistent with mild dementia. She also has depression.     She also  endorses loss of appetite.     Review of Systems:   14 systems reviewed - All other systems were reviewed and negative except as mentioned in the HPI    Allergies:  Review of patient's allergies indicates:  No Known Allergies    Current Medications:  Current Outpatient Medications   Medication Sig Dispense Refill    aspirin (ECOTRIN) 81 MG EC tablet Take 81 mg by mouth once daily.       atorvastatin (LIPITOR) 10 MG tablet Take 1 tablet (10 mg total) by mouth once daily. 90 tablet 3    cycloSPORINE (RESTASIS) 0.05 % ophthalmic emulsion Place 1 drop into both eyes 2 (two) times daily.      FLUoxetine 20 MG capsule Take 20 mg by mouth once daily.      Lactobacillus rhamnosus GG (CULTURELLE) 10 billion cell capsule Take 1 capsule by mouth once daily.      multivitamin capsule Take 1 capsule by mouth once daily.      vitamin D (VITAMIN D3) 1000 units Tab Take 4,000 Units by mouth once daily.      meloxicam (MOBIC) 15 MG tablet Take 1 tablet (15 mg total) by mouth once daily. (Patient not taking: Reported on 6/15/2020) 30 tablet 2    nystatin (MYCOSTATIN) 100,000 unit/mL suspension Take 6 mLs (600,000 Units total) by mouth 3 (three) times daily. Swish and swallow. (Patient not taking: Reported on 6/15/2020) 120 mL 0     No current facility-administered medications for this visit.      Facility-Administered Medications Ordered in Other Visits   Medication Dose Route Frequency Provider Last Rate Last Dose    omnipaque 350 iohexol 80 mL  80 mL Intravenous ONCE PRN BRENDEN Bailey Jr., MD           Past Medical History:  Past Medical History:   Diagnosis Date    Dyslipidemia 10/2/2015    Hyperlipidemia     Hypertension     minimal    Migraines     Obesity 10/2/2015    Patent foramen ovale 10/2/2015       Past Surgical History:  Past Surgical History:   Procedure Laterality Date    COLONOSCOPY N/A 1/18/2017    Procedure: COLONOSCOPY;  Surgeon: Gerber Naylor MD;  [facility];  Service: Endoscopy;   "Laterality: N/A;    HYSTERECTOMY      left elbow      left knee         Family History:  family history includes COPD in her father; Diabetes in her father; Heart disease in her father.    Social History:   reports that she has quit smoking. She has a 7.50 pack-year smoking history. She has never used smokeless tobacco. She reports current alcohol use of about 1.0 standard drinks of alcohol per week. She reports that she does not use drugs.    Physical Exam:  Vitals:    06/26/20 0910   BP: 127/74   Pulse: 60   Temp: 97.1 °F (36.2 °C)   TempSrc: Tympanic   Weight: 80.3 kg (177 lb 0.5 oz)   Height: 5' 3" (1.6 m)   PainSc: 0-No pain     Body mass index is 31.36 kg/m².  General: Well developed, well nourished.  No acute distress.  Eyes: optic discs without papilledema    Musculoskeletal: No obvious joint deformities, moves all extremities well.  Peripheral vascular: No edema noted    Neurological Exam:  Mental status: Awake and alert  Speech language: No dysarthria or aphasia on conversation  Cranial nerves: Face symmetric  Motor: Moves all extremities well  Sensory: intact to vibration in toes bilaterally  Coordination: No ataxia. No tremor.   Gait: Normal gait            Data:  I have personally reviewed the referring provider's notes, labs, & imaging made available to me today.       Labs:  CBC:   Lab Results   Component Value Date    WBC 6.44 06/13/2020    HGB 14.7 06/13/2020    HCT 43.9 06/13/2020     06/13/2020    MCV 87 06/13/2020    RDW 13.5 06/13/2020     BMP:   Lab Results   Component Value Date     06/13/2020    K 4.8 06/13/2020     06/13/2020    CO2 27 06/13/2020    BUN 22 (H) 06/13/2020    CREATININE 0.73 06/13/2020    GLU 95 06/13/2020    CALCIUM 9.9 06/13/2020    MG 1.8 10/03/2017     LFTS;   Lab Results   Component Value Date    PROT 7.3 06/13/2020    ALBUMIN 4.3 06/13/2020    BILITOT 1.0 06/13/2020    AST 29 06/13/2020    ALKPHOS 82 06/13/2020    ALT 14 06/13/2020     COAGS: No " results found for: INR, PROTIME, PTT  FLP:   Lab Results   Component Value Date    CHOL 245 (H) 06/13/2020    HDL 48 06/13/2020    LDLCALC 163.4 (H) 06/13/2020    TRIG 168 (H) 06/13/2020    CHOLHDL 19.6 (L) 06/13/2020         Imaging:  I have personally reviewed the imaging, CT head in April 2020 showed frontal predominant atrophy.     Assessment and Plan:  Ms. Garcia is a 74 y.o. female who is here to establish care for a new diagnosis of mild dementia. Given the description, alzheimer's pathology is most likely. Her brain imaging doesn't show secondary cause. Her labwork shows high vitamin B6 and this is due to too much in this vitamin she is taking. I advised her to stop that deborah-vitamin. We will start aricept. We will followup in 6 months or sooner if needed.     She should continue to follow with her PCP for depression treatment. She feels this is well controlled.        Major neurocognitive disorder, due to vascular disease, without behavioral disturbance, mild    Major depressive disorder, recurrent, moderate      Cognition and function were assessed and the patient's functional assessment staging test (FAST) score is 4. Patient is felt to have decision making capacity. PHQ-2 score was 0. Medications were reconciled and reviewed for high-risk medications. The patient's behavior and psychiatric health were reviewed and addressed. The patient and family was given information on safety in the home and operation of vehicles. Discussed caregiver needs and social support. Advance Care Plan is written--they will bring to scan into system. Written care plan and support information provided to the patient or caregiver and information was provided.

## 2020-07-15 ENCOUNTER — TELEPHONE (OUTPATIENT)
Dept: NEUROLOGY | Facility: CLINIC | Age: 75
End: 2020-07-15

## 2020-07-24 ENCOUNTER — PATIENT MESSAGE (OUTPATIENT)
Dept: NEUROLOGY | Facility: CLINIC | Age: 75
End: 2020-07-24

## 2020-07-24 PROBLEM — F33.1 MAJOR DEPRESSIVE DISORDER, RECURRENT, MODERATE: Chronic | Status: ACTIVE | Noted: 2020-04-08

## 2020-07-24 PROBLEM — F01.A0 MAJOR NEUROCOGNITIVE DISORDER, DUE TO VASCULAR DISEASE, WITHOUT BEHAVIORAL DISTURBANCE, MILD: Chronic | Status: ACTIVE | Noted: 2020-03-31

## 2020-09-23 ENCOUNTER — TELEPHONE (OUTPATIENT)
Dept: NEUROLOGY | Facility: CLINIC | Age: 75
End: 2020-09-23

## 2020-09-23 NOTE — TELEPHONE ENCOUNTER
----- Message from Stacey George MA sent at 9/23/2020  3:27 PM CDT -----  Regarding: Call Back  PT daughter is requesting a call back to [facility] an appt  Call back # [phone]- Juli Gallardo

## 2020-11-02 PROBLEM — R52 PAIN: Status: ACTIVE | Noted: 2020-11-02

## 2020-11-02 PROBLEM — S82.851A CLOSED RIGHT TRIMALLEOLAR FRACTURE: Status: ACTIVE | Noted: 2020-11-02

## 2020-11-05 ENCOUNTER — TELEPHONE (OUTPATIENT)
Dept: ORTHOPEDICS | Facility: CLINIC | Age: 75
End: 2020-11-05

## 2020-11-05 DIAGNOSIS — S82.851A CLOSED TRIMALLEOLAR FRACTURE OF RIGHT ANKLE, INITIAL ENCOUNTER: Primary | ICD-10-CM

## 2020-11-14 PROCEDURE — G0180 PR HOME HEALTH MD CERTIFICATION: ICD-10-PCS | Mod: ,,, | Performed by: PHYSICAL MEDICINE & REHABILITATION

## 2020-11-14 PROCEDURE — G0180 MD CERTIFICATION HHA PATIENT: HCPCS | Mod: ,,, | Performed by: PHYSICAL MEDICINE & REHABILITATION

## 2020-11-19 ENCOUNTER — HOSPITAL ENCOUNTER (OUTPATIENT)
Dept: RADIOLOGY | Facility: HOSPITAL | Age: 75
Discharge: HOME | End: 2020-11-19
Attending: ORTHOPAEDIC SURGERY
Payer: MEDICARE

## 2020-11-19 ENCOUNTER — OFFICE VISIT (OUTPATIENT)
Dept: ORTHOPEDICS | Facility: CLINIC | Age: 75
End: 2020-11-19
Payer: MEDICARE

## 2020-11-19 VITALS
BODY MASS INDEX: 28.86 KG/M2 | WEIGHT: 169.06 LBS | SYSTOLIC BLOOD PRESSURE: 124 MMHG | DIASTOLIC BLOOD PRESSURE: 78 MMHG | HEART RATE: 57 BPM | HEIGHT: 64 IN

## 2020-11-19 DIAGNOSIS — S82.851A CLOSED TRIMALLEOLAR FRACTURE OF RIGHT ANKLE, INITIAL ENCOUNTER: ICD-10-CM

## 2020-11-19 DIAGNOSIS — S82.851D CLOSED TRIMALLEOLAR FRACTURE OF RIGHT ANKLE WITH ROUTINE HEALING, SUBSEQUENT ENCOUNTER: Primary | ICD-10-CM

## 2020-11-19 PROCEDURE — 99214 OFFICE O/P EST MOD 30 MIN: CPT | Mod: PBBFAC,25,PN | Performed by: ORTHOPAEDIC SURGERY

## 2020-11-19 PROCEDURE — 99024 PR POST-OP FOLLOW-UP VISIT: ICD-10-PCS | Mod: POP,,, | Performed by: ORTHOPAEDIC SURGERY

## 2020-11-19 PROCEDURE — 29405 APPL SHORT LEG CAST: CPT | Mod: 58,S$PBB,RT, | Performed by: ORTHOPAEDIC SURGERY

## 2020-11-19 PROCEDURE — 73610 XR ANKLE COMPLETE 3 VIEW RIGHT: ICD-10-PCS | Mod: 26,RT,, | Performed by: RADIOLOGY

## 2020-11-19 PROCEDURE — 73610 X-RAY EXAM OF ANKLE: CPT | Mod: TC,PO,RT

## 2020-11-19 PROCEDURE — 99024 POSTOP FOLLOW-UP VISIT: CPT | Mod: POP,,, | Performed by: ORTHOPAEDIC SURGERY

## 2020-11-19 PROCEDURE — 29405 APPL SHORT LEG CAST: CPT | Mod: PBBFAC,PN | Performed by: ORTHOPAEDIC SURGERY

## 2020-11-19 PROCEDURE — 99999 PR PBB SHADOW E&M-EST. PATIENT-LVL IV: CPT | Mod: PBBFAC,,, | Performed by: ORTHOPAEDIC SURGERY

## 2020-11-19 PROCEDURE — 29405 PR APPLY SHORT LEG CAST: ICD-10-PCS | Mod: 58,S$PBB,RT, | Performed by: ORTHOPAEDIC SURGERY

## 2020-11-19 PROCEDURE — 99999 PR PBB SHADOW E&M-EST. PATIENT-LVL IV: ICD-10-PCS | Mod: PBBFAC,,, | Performed by: ORTHOPAEDIC SURGERY

## 2020-11-19 PROCEDURE — 73610 X-RAY EXAM OF ANKLE: CPT | Mod: 26,RT,, | Performed by: RADIOLOGY

## 2020-11-19 NOTE — NURSING
Per Dr. Rome's orders to remove sutures and staples. Sutures and staples to right lower extremity were removed without any complications. Benzoin tincture applied to outer edges of incision, steri strips applied.     Dr. Rome ordered short leg cast to be applied to right lower extremity. Right short leg cast applied to extremity without any problems. Patient tolerated application of cast well. Patient able to move right toes freely, cap refill less than 3 seconds. Instructed patient on cast care to include non weight bearing to extremity and elevation of extremity. Cast is not able to get wet. If cast gets wet patient must call the office to come in for cast change. If it is after hours or on the weekend patient to go to ER to have cast removed. Patient to call for an appointment to have cast reapplied on next business day. Do not stick anything inside cast as it could open the skin causing a wound and/or infection. Continue to elevate affected extremity above the heart to allow for swelling to resolve. Patient to call the office if cast starts to rub or becomes loose on lower extremity. Patient and daughter stated understanding.

## 2020-11-20 NOTE — PROGRESS NOTES
Subjective:      Patient ID: Christine Garcia is a 75 y.o. female.    Chief Complaint: Pain and Post-op Evaluation of the Right Ankle and Post-op Evaluation (ORIF distal fib fx; ORIF syndesmosis disruption)    Mrs. Garcia is doing very well today. She rates her pain as 3/10 today.   DOS: 11/3/20.     Social History     Occupational History    Not on file   Tobacco Use    Smoking status: Former Smoker     Packs/day: 1.00     Years: 20.00     Pack years: 20.00     Types: Cigarettes     Quit date:      Years since quittin.9    Smokeless tobacco: Never Used    Tobacco comment: 1-2ppd   Substance and Sexual Activity    Alcohol use: Yes     Alcohol/week: 1.0 standard drinks     Types: 1 Shots of liquor per week     Frequency: Monthly or less     Drinks per session: 1 or 2     Binge frequency: Never     Comment: occ    Drug use: Never    Sexual activity: Yes     Partners: Male     Birth control/protection: Post-menopausal            Objective:    Ortho Exam   RLE: Neurovascularly intact, incisions well healed, moderate swelling, sutures are intact.  No signs of infection.  Moves toes well.     XRAYS: 3 views of right ankle obtained and reviewed today reveal  Good reduction and alignment. Hardware is intact .         Assessment:         s/p open reduction internal fixation trimalleolar ankle fracture  Plan:       Sutures and staples removed today steri strips placed. Short leg fiberglass cast applied. Non-weightbearing.  F/u 2 weeks with xray out of plaster right ankle.

## 2020-11-25 DIAGNOSIS — S82.851D CLOSED TRIMALLEOLAR FRACTURE OF RIGHT ANKLE WITH ROUTINE HEALING, SUBSEQUENT ENCOUNTER: Primary | ICD-10-CM

## 2020-11-30 ENCOUNTER — OFFICE VISIT (OUTPATIENT)
Dept: ORTHOPEDICS | Facility: CLINIC | Age: 75
End: 2020-11-30
Payer: MEDICARE

## 2020-11-30 ENCOUNTER — HOSPITAL ENCOUNTER (OUTPATIENT)
Dept: RADIOLOGY | Facility: HOSPITAL | Age: 75
Discharge: HOME | End: 2020-11-30
Attending: ORTHOPAEDIC SURGERY
Payer: MEDICARE

## 2020-11-30 VITALS
SYSTOLIC BLOOD PRESSURE: 130 MMHG | BODY MASS INDEX: 28.86 KG/M2 | WEIGHT: 169.06 LBS | DIASTOLIC BLOOD PRESSURE: 69 MMHG | HEIGHT: 64 IN | HEART RATE: 57 BPM

## 2020-11-30 DIAGNOSIS — S82.851D CLOSED TRIMALLEOLAR FRACTURE OF RIGHT ANKLE WITH ROUTINE HEALING, SUBSEQUENT ENCOUNTER: Primary | ICD-10-CM

## 2020-11-30 DIAGNOSIS — S82.851D CLOSED TRIMALLEOLAR FRACTURE OF RIGHT ANKLE WITH ROUTINE HEALING, SUBSEQUENT ENCOUNTER: ICD-10-CM

## 2020-11-30 PROCEDURE — 73610 X-RAY EXAM OF ANKLE: CPT | Mod: TC,PO,RT

## 2020-11-30 PROCEDURE — 99024 PR POST-OP FOLLOW-UP VISIT: ICD-10-PCS | Mod: POP,,, | Performed by: ORTHOPAEDIC SURGERY

## 2020-11-30 PROCEDURE — 99999 PR PBB SHADOW E&M-EST. PATIENT-LVL III: ICD-10-PCS | Mod: PBBFAC,,, | Performed by: ORTHOPAEDIC SURGERY

## 2020-11-30 PROCEDURE — 99024 POSTOP FOLLOW-UP VISIT: CPT | Mod: POP,,, | Performed by: ORTHOPAEDIC SURGERY

## 2020-11-30 PROCEDURE — 99213 OFFICE O/P EST LOW 20 MIN: CPT | Mod: PBBFAC,25,PN | Performed by: ORTHOPAEDIC SURGERY

## 2020-11-30 PROCEDURE — 73610 X-RAY EXAM OF ANKLE: CPT | Mod: 26,RT,, | Performed by: RADIOLOGY

## 2020-11-30 PROCEDURE — 97760 ORTHOTIC MGMT&TRAING 1ST ENC: CPT | Mod: S$PBB,GP,, | Performed by: ORTHOPAEDIC SURGERY

## 2020-11-30 PROCEDURE — 99999 PR PBB SHADOW E&M-EST. PATIENT-LVL III: CPT | Mod: PBBFAC,,, | Performed by: ORTHOPAEDIC SURGERY

## 2020-11-30 PROCEDURE — 73610 XR ANKLE COMPLETE 3 VIEW RIGHT: ICD-10-PCS | Mod: 26,RT,, | Performed by: RADIOLOGY

## 2020-11-30 PROCEDURE — 97760 PR ORTHOTIC MGMT&TRAINJ INITIAL ENC EA 15 MINS: ICD-10-PCS | Mod: S$PBB,GP,, | Performed by: ORTHOPAEDIC SURGERY

## 2020-11-30 RX ORDER — ASPIRIN 325 MG
325 TABLET ORAL 2 TIMES DAILY
COMMUNITY
End: 2020-11-30

## 2020-12-01 ENCOUNTER — PATIENT MESSAGE (OUTPATIENT)
Dept: NEUROLOGY | Facility: CLINIC | Age: 75
End: 2020-12-01

## 2020-12-01 NOTE — PROGRESS NOTES
Subjective:      Patient ID: Christine Garcia is a 75 y.o. female.    Chief Complaint: Post-op Evaluation of the Right Ankle (DOS 2020 ORIF distal fibula fx and syndesmosis disruption)    Mrs. Garcia is doing very well today. She rates her pain as 1/10 today.   DOS: 11/3/20.     Social History     Occupational History    Not on file   Tobacco Use    Smoking status: Former Smoker     Packs/day: 1.00     Years: 20.00     Pack years: 20.00     Types: Cigarettes     Quit date:      Years since quittin.9    Smokeless tobacco: Never Used    Tobacco comment: 1-2ppd   Substance and Sexual Activity    Alcohol use: Yes     Alcohol/week: 1.0 standard drinks     Types: 1 Shots of liquor per week     Frequency: Monthly or less     Drinks per session: 1 or 2     Binge frequency: Never     Comment: occ    Drug use: Never    Sexual activity: Yes     Partners: Male     Birth control/protection: Post-menopausal, None            Objective:    Ortho Exam   RLE: Neurovascularly intact, incisions well healed, mild swelling,   No signs of infection.  Moves toes well.  Minimal  tenderness to palpation at the fracture sites. Decreased ROM of the ankle.    XRAYS: 3 views of right ankle obtained and reviewed today reveal  Good reduction and alignment. Hardware is intact . There is interval progression of healing.         Assessment:         s/p open reduction internal fixation trimalleolar ankle fracture  Plan:     We performed a custom orthotic/brace adjustment, fitting and training with the patient today. The patient demonstrated understanding and proper care. This was performed for 15 minutes.  Short CAM boot  was applied.   F/u 2 weeks with xray right ankle and we will advance PT at that time.

## 2020-12-02 ENCOUNTER — CLINICAL SUPPORT (OUTPATIENT)
Dept: CARDIOLOGY | Facility: CLINIC | Age: 75
End: 2020-12-02
Payer: MEDICARE

## 2020-12-02 ENCOUNTER — OFFICE VISIT (OUTPATIENT)
Dept: NEUROLOGY | Facility: CLINIC | Age: 75
End: 2020-12-02
Payer: MEDICARE

## 2020-12-02 VITALS — HEART RATE: 67 BPM | DIASTOLIC BLOOD PRESSURE: 67 MMHG | TEMPERATURE: 97 F | SYSTOLIC BLOOD PRESSURE: 127 MMHG

## 2020-12-02 DIAGNOSIS — F01.A0 MAJOR NEUROCOGNITIVE DISORDER, DUE TO VASCULAR DISEASE, WITHOUT BEHAVIORAL DISTURBANCE, MILD: Primary | ICD-10-CM

## 2020-12-02 DIAGNOSIS — F33.1 MAJOR DEPRESSIVE DISORDER, RECURRENT, MODERATE: ICD-10-CM

## 2020-12-02 DIAGNOSIS — F95.9 HABIT TIC: ICD-10-CM

## 2020-12-02 DIAGNOSIS — F01.A0 MAJOR NEUROCOGNITIVE DISORDER, DUE TO VASCULAR DISEASE, WITHOUT BEHAVIORAL DISTURBANCE, MILD: ICD-10-CM

## 2020-12-02 PROCEDURE — 99214 OFFICE O/P EST MOD 30 MIN: CPT | Mod: PBBFAC,PN,25 | Performed by: PSYCHIATRY & NEUROLOGY

## 2020-12-02 PROCEDURE — 93010 EKG 12-LEAD: ICD-10-PCS | Mod: S$PBB,,, | Performed by: INTERNAL MEDICINE

## 2020-12-02 PROCEDURE — 99214 PR OFFICE/OUTPT VISIT, EST, LEVL IV, 30-39 MIN: ICD-10-PCS | Mod: S$PBB,,, | Performed by: PSYCHIATRY & NEUROLOGY

## 2020-12-02 PROCEDURE — 99214 OFFICE O/P EST MOD 30 MIN: CPT | Mod: S$PBB,,, | Performed by: PSYCHIATRY & NEUROLOGY

## 2020-12-02 PROCEDURE — 93005 ELECTROCARDIOGRAM TRACING: CPT | Mod: PBBFAC,PO | Performed by: INTERNAL MEDICINE

## 2020-12-02 PROCEDURE — 99999 PR PBB SHADOW E&M-EST. PATIENT-LVL IV: CPT | Mod: PBBFAC,,, | Performed by: PSYCHIATRY & NEUROLOGY

## 2020-12-02 PROCEDURE — 93010 ELECTROCARDIOGRAM REPORT: CPT | Mod: S$PBB,,, | Performed by: INTERNAL MEDICINE

## 2020-12-02 PROCEDURE — 99999 PR PBB SHADOW E&M-EST. PATIENT-LVL IV: ICD-10-PCS | Mod: PBBFAC,,, | Performed by: PSYCHIATRY & NEUROLOGY

## 2020-12-02 NOTE — PROGRESS NOTES
Date: 12/3/2020    Patient ID: Christine Garcia is a 75 y.o. female.    Chief Complaint: Memory Loss      History of Present Illness:  Ms. Garcia is a 75 y.o. female who presents for followup of memory trouble. The patient was accompanied by her daughter who also contributed to the following history.      Interval history: At her last appointment, we started aricept 10 mg nightly. This was perhaps making her tired, she then cut it down to 5 mg nightly. She then seemed better. Her daughter restarted 10 mg nightly and didn't notice any of the side effects of sleepiness (but she was also on concerta). She continues on namenda 5 mg BID. She feels like things are the same.     Her family notes that she has started to pick at her face and is causing sores.     She is on methylphenidate--just started that last month. She takes citalopram. It is working with depression but not anxiety. She was on prozac before that.     She has been on CPAP for ERENDIRA.     Prior HPI:  The daughter notes short term memory trouble for years but this was mild at first. This seems to get worse with stress--like when her mother  in . She notes repetitive questions and stories. The patient is unaware of issues with her memory other than mild forgetfulness. The daughter manages her finances since the  of . The patient's  had a stroke in Oct 2019 and her memory seemed to decline more after that. 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 They noticed significant worsening after her 's stroke. She is repeating conversations throughout the day. Her friends have been worried. The order take out a lot. Household chores for example have taken a backburner since she is caring for her . No major word finding trouble.      She has been driving (before her knee surgery). No trouble driving. She enjoys needlepoint work. She does not feel depressed. She still finds enjoyment with activities. She is on prozac but she  doesn't like to take medication.      Dr. Marquez conducted a MOCA in 3/2020 and this was a 20/22. MRI brain from 2018 showed age appropriate atrophy. Vitamin levels were ordered. B6 was very high. She is on a supplement taking deborah doses of vitamins. No numbness/tingling. Vitamin D was low. She takes vitamin D supplements. CT head in April 2020 showed frontal predominant atrophy.      She underwent neuropsych testing with Dr. Zhao on 6/17/2020. This was consistent with mild dementia. She also has depression. MOCA in June 2020 was 23/30     She also endorses loss of appetite.     Review of Systems:   All other systems were reviewed and negative except as mentioned in the HPI    Allergies:  Review of patient's allergies indicates:  No Known Allergies    Current Medications:  Current Outpatient Medications   Medication Sig Dispense Refill    citalopram (CELEXA) 10 MG tablet Take 10 mg by mouth once daily.      citalopram (CELEXA) 20 MG tablet Take 20 mg by mouth once daily.      cycloSPORINE (RESTASIS) 0.05 % ophthalmic emulsion Place 1 drop into both eyes 2 (two) times daily.      donepeziL (ARICEPT) 10 MG tablet Take 1 tablet (10 mg total) by mouth every evening. 30 tablet 11    ergocalciferol (ERGOCALCIFEROL) 50,000 unit Cap Take 50,000 Units by mouth every Sunday.       fexofenadine (ALLEGRA) 180 MG tablet Take 180 mg by mouth as needed (allergies).      Lactobacillus rhamnosus GG (CULTURELLE) 10 billion cell capsule Take 1 capsule by mouth once daily.      memantine (NAMENDA) 5 MG Tab Take 5 mg by mouth 2 (two) times daily.      methylphenidate HCl 18 MG CR tablet Take 18 mg by mouth every morning.      multivitamin capsule Take 1 capsule by mouth once daily.      UNABLE TO FIND Take 2 capsules by mouth every evening. medication name: Vivix      UNABLE TO FIND Take 1 tablet by mouth once daily. medication name: Optiflora probitoic      vitamin D (VITAMIN D3) 1000 units Tab Take 4,000 Units by  mouth once daily.      atorvastatin (LIPITOR) 40 MG tablet Take 1 tablet (40 mg total) by mouth once daily. 90 tablet 3    oxyCODONE-acetaminophen (PERCOCET) 5-325 mg per tablet TAKE 1 TABLET BY MOUTH EVERY 8 (EIGHT) HOURS AS NEEDED PAIN MAX. DAILY AMOUNT  3 TABLETS       No current facility-administered medications for this visit.      Facility-Administered Medications Ordered in Other Visits   Medication Dose Route Frequency Provider Last Rate Last Dose    omnipaque 350 iohexol 80 mL  80 mL Intravenous ONCE PRN BRENDEN Bailey Jr., MD           Past Medical History:  Past Medical History:   Diagnosis Date    Dyslipidemia 10/2/2015    Fall 11/02/2020    with fracture    Hyperlipidemia     Hypertension     minimal    Migraines     Obesity 10/2/2015    Patent foramen ovale 10/2/2015       Past Surgical History:  Past Surgical History:   Procedure Laterality Date    COLONOSCOPY N/A 1/18/2017    Procedure: COLONOSCOPY;  Surgeon: Gerber Naylor MD;  Location: [facility] ENDO;  Service: Endoscopy;  Laterality: N/A;    HYSTERECTOMY      KNEE ARTHROPLASTY Bilateral 06/16/2020    meniscus repair    left elbow      left knee      OPEN REDUCTION AND INTERNAL FIXATION (ORIF) OF INJURY OF ANKLE Right 11/3/2020    Procedure: ORIF, ANKLE;  Surgeon: Ted Rome MD;  Location: [facility] OR;  Service: Orthopedics;  Laterality: Right;       Family History:  family history includes COPD in her father; Diabetes in her father; Heart disease in her father; Lung cancer in her brother.    Social History:   reports that she quit smoking about 33 years ago. Her smoking use included cigarettes. She has a 20.00 pack-year smoking history. She has never used smokeless tobacco. She reports current alcohol use of about 1.0 standard drinks of alcohol per week. She reports that she does not use drugs.    Physical Exam:  Vitals:    12/02/20 1318   BP: 127/67   Pulse: 67   Temp: 97.2 °F (36.2 °C)   TempSrc: Temporal   PainSc: 0-No  pain     There is no height or weight on file to calculate BMI.  General: Well developed, well nourished.  No acute distress.  Musculoskeletal: right leg in boot, moves all extremities well.  Peripheral vascular: No edema noted    Neurological Exam:  General: well-developed, well-nourished, no distress  Mental status: Awake and alert. MOCA 24/30   Speech language: No dysarthria or aphasia on conversation  Cranial nerves: Face symmetric  Motor: Moves all extremities well  Coordination: No ataxia. No tremor.             Data:  I have personally reviewed other provider's notes, labs, & imaging made available to me today.       Labs:  CBC:   Lab Results   Component Value Date    WBC 13.35 (H) 11/02/2020    HGB 14.4 11/02/2020    HCT 41.6 11/02/2020     11/02/2020    MCV 87 11/02/2020    RDW 13.2 11/02/2020     BMP:   Lab Results   Component Value Date     11/02/2020    K 3.8 11/02/2020     11/02/2020    CO2 29 11/02/2020    BUN 24 (H) 11/02/2020    CREATININE 0.71 11/02/2020     (H) 11/02/2020    CALCIUM 9.3 11/02/2020    MG 1.8 10/03/2017     LFTS;   Lab Results   Component Value Date    PROT 7.3 11/02/2020    ALBUMIN 4.3 11/02/2020    BILITOT 0.9 11/02/2020    AST 36 11/02/2020    ALKPHOS 101 11/02/2020    ALT 29 11/02/2020     COAGS: No results found for: INR, PROTIME, PTT  FLP:   Lab Results   Component Value Date    CHOL 245 (H) 06/13/2020    HDL 48 06/13/2020    LDLCALC 163.4 (H) 06/13/2020    TRIG 168 (H) 06/13/2020    CHOLHDL 19.6 (L) 06/13/2020         Imaging:  I have personally reviewed the imaging, CT head shows atrophy.     Assessment and Plan:  Ms. Garcia is a 75 y.o. female here for followup of memory loss. Her MOCA is one point higher than it was 6 months ago (overall stable). They have noticed some picking behaviors and she is picking sores on her face. I discussed that this could be exacerbated by stimulant use and to talk with her psychiatrist about changing it or a trial  off of it. Also, antidepressants can be used to treat it--she is on celexa but he might want to change this. Seroquel can be used too but would like to avoid this.     Given that she is on aricept and now celexa, we will check EKG to monitor QT interval. If prolonged, we will need to either cut back on aricept or see if psychiatry as an alternative to celexa.     For her memory, we will continue aricept and namenda. If they feel the aricept is causing fatigue, we can lower the dose. The daughter was hesitant to do that.     Major neurocognitive disorder, due to vascular disease, without behavioral disturbance, mild  -     EKG 12-lead; Future    Major depressive disorder, recurrent, moderate  -     EKG 12-lead; Future    Habit tic

## 2020-12-04 ENCOUNTER — OFFICE VISIT (OUTPATIENT)
Dept: ORTHOPEDICS | Facility: CLINIC | Age: 75
End: 2020-12-04
Payer: MEDICARE

## 2020-12-04 ENCOUNTER — HOSPITAL ENCOUNTER (OUTPATIENT)
Dept: RADIOLOGY | Facility: HOSPITAL | Age: 75
Discharge: HOME | End: 2020-12-04
Attending: NURSE PRACTITIONER
Payer: MEDICARE

## 2020-12-04 ENCOUNTER — TELEPHONE (OUTPATIENT)
Dept: NEUROLOGY | Facility: CLINIC | Age: 75
End: 2020-12-04

## 2020-12-04 DIAGNOSIS — Z78.0 POSTMENOPAUSAL STATUS, AGE-RELATED: ICD-10-CM

## 2020-12-04 DIAGNOSIS — S82.851D CLOSED TRIMALLEOLAR FRACTURE OF RIGHT ANKLE WITH ROUTINE HEALING, SUBSEQUENT ENCOUNTER: Primary | ICD-10-CM

## 2020-12-04 PROCEDURE — 99214 OFFICE O/P EST MOD 30 MIN: CPT | Mod: S$PBB,24,, | Performed by: NURSE PRACTITIONER

## 2020-12-04 PROCEDURE — 99212 OFFICE O/P EST SF 10 MIN: CPT | Mod: PBBFAC,25,PN | Performed by: NURSE PRACTITIONER

## 2020-12-04 PROCEDURE — 99214 PR OFFICE/OUTPT VISIT, EST, LEVL IV, 30-39 MIN: ICD-10-PCS | Mod: S$PBB,24,, | Performed by: NURSE PRACTITIONER

## 2020-12-04 PROCEDURE — 99999 PR PBB SHADOW E&M-EST. PATIENT-LVL II: CPT | Mod: PBBFAC,,, | Performed by: NURSE PRACTITIONER

## 2020-12-04 PROCEDURE — 77080 DEXA BONE DENSITY SPINE HIP: ICD-10-PCS | Mod: 26,,, | Performed by: RADIOLOGY

## 2020-12-04 PROCEDURE — 99999 PR PBB SHADOW E&M-EST. PATIENT-LVL II: ICD-10-PCS | Mod: PBBFAC,,, | Performed by: NURSE PRACTITIONER

## 2020-12-04 PROCEDURE — 77080 DXA BONE DENSITY AXIAL: CPT | Mod: TC,PO

## 2020-12-04 PROCEDURE — 77080 DXA BONE DENSITY AXIAL: CPT | Mod: 26,,, | Performed by: RADIOLOGY

## 2020-12-04 RX ORDER — ATORVASTATIN CALCIUM 40 MG/1
40 TABLET, FILM COATED ORAL DAILY
COMMUNITY
End: 2021-06-30

## 2020-12-04 NOTE — LETTER
December 9, 2020      Ted Rome MD  [address] [facility] [address] [place] [address]           [facility]  [address] [facility] [place] [address]  Phone: [phone]  Fax: [phone]          Patient: Christine Garcia   MR Number: [id number]   YOB: 1945   Date of Visit: 12/4/2020       Dear Dr. Ted Rome:    Thank you for referring Christine Garcia to me for evaluation. Attached you will find relevant portions of my assessment and plan of care.    If you have questions, please do not hesitate to call me. I look forward to following Christine Garcia along with you.    Sincerely,    Gali Velazquez NP    Enclosure  CC:  No Recipients    If you would like to receive this communication electronically, please contact [email or web address] or [phone] to request more information on [facility] access.    For providers and/or their staff who would like to refer a patient to [facility], please contact us through our one-stop-shop provider referral line, [facility], at [phone].    If you feel you have received this communication in error or would no longer like to receive these types of communications, please e-mail [email or web address]

## 2020-12-04 NOTE — TELEPHONE ENCOUNTER
----- Message from Bonnie Abrams sent at 12/4/2020  3:06 PM CST -----  Regarding: pt's daughter  Type: Patient Call Back    Who called:pt's daughter edyta    What is the request in detail:requesting to have records sent to dr sanaz reyes fax no [phone]. Records needs to be sent before Monday     Can the clinic reply by [facility]?    Would the patient rather a call back or a response via [facility]? call    Best call back number: [phone]    Additional Information:

## 2020-12-04 NOTE — TELEPHONE ENCOUNTER
----- Message from Bonnie Abrams sent at 12/4/2020  3:06 PM CST -----  Regarding: pt's daughter  Type: Patient Call Back    Who called:pt's daughter edyta    What is the request in detail:requesting to have records sent to dr sanaz reyes fax no [phone]. Records needs to be sent before Monday     Can the clinic reply by [facility]?    Would the patient rather a call back or a response via [facility]? call    Best call back number: [phone]    Additional Information:

## 2020-12-04 NOTE — PROGRESS NOTES
SUBJECTIVE:     Chief Complaint & History of Present Illness:  Christine Garcia is a new patient 75 y.o. female who is seen here today for a bone health evaluation for osteoporosis, fracture prevention, and risk evaluation for future fractures.   she is accompanied by daughter  today who is helpful with her history.     she was appropriately identified and referred by Ted Rome MD due to concerns for compromised bone quality, and risk of future fractures.  This visit is medically necessary to identify risk, investigate and initiative treatment as appropriate to improve bone quality and strength for the reduction of secondary fractures.     her medical history, medications and fracture history will be reviewed and summarized      Review of patient's allergies indicates:  No Known Allergies      Current Outpatient Medications   Medication Sig Dispense Refill    atorvastatin (LIPITOR) 40 MG tablet Take 40 mg by mouth once daily.      citalopram (CELEXA) 20 MG tablet Take 20 mg by mouth once daily.      cycloSPORINE (RESTASIS) 0.05 % ophthalmic emulsion Place 1 drop into both eyes 2 (two) times daily.      donepeziL (ARICEPT) 10 MG tablet Take 1 tablet (10 mg total) by mouth every evening. 30 tablet 11    ergocalciferol (ERGOCALCIFEROL) 50,000 unit Cap Take 50,000 Units by mouth every Sunday.       fexofenadine (ALLEGRA) 180 MG tablet Take 180 mg by mouth as needed (allergies).      memantine (NAMENDA) 5 MG Tab Take 5 mg by mouth 2 (two) times daily.      methylphenidate HCl 18 MG CR tablet Take 18 mg by mouth every morning.      multivitamin capsule Take 1 capsule by mouth once daily.      oxyCODONE-acetaminophen (PERCOCET) 5-325 mg per tablet TAKE 1 TABLET BY MOUTH EVERY 8 (EIGHT) HOURS AS NEEDED PAIN MAX. DAILY AMOUNT  3 TABLETS      UNABLE TO FIND Take 2 capsules by mouth every evening. medication name: Vivix      UNABLE TO FIND Take 1 tablet by mouth once daily. medication name: Optiflora  probitoic      vitamin D (VITAMIN D3) 1000 units Tab Take 4,000 Units by mouth once daily.      citalopram (CELEXA) 10 MG tablet Take 10 mg by mouth once daily.      Lactobacillus rhamnosus GG (CULTURELLE) 10 billion cell capsule Take 1 capsule by mouth once daily.       No current facility-administered medications for this visit.      Facility-Administered Medications Ordered in Other Visits   Medication Dose Route Frequency Provider Last Rate Last Dose    omnipaque 350 iohexol 80 mL  80 mL Intravenous ONCE PRN BRENDEN Bailey Jr., MD           Past Medical History:   Diagnosis Date    Dyslipidemia 10/2/2015    Fall 11/02/2020    with fracture    Hyperlipidemia     Hypertension     minimal    Migraines     Obesity 10/2/2015    Patent foramen ovale 10/2/2015       Past Surgical History:   Procedure Laterality Date    COLONOSCOPY N/A 1/18/2017    Procedure: COLONOSCOPY;  Surgeon: Gerber Naylor MD;  Location: [facility] ENDO;  Service: Endoscopy;  Laterality: N/A;    HYSTERECTOMY      KNEE ARTHROPLASTY Bilateral 06/16/2020    meniscus repair    left elbow      left knee      OPEN REDUCTION AND INTERNAL FIXATION (ORIF) OF INJURY OF ANKLE Right 11/3/2020    Procedure: ORIF, ANKLE;  Surgeon: Ted Rome MD;  Location: [facility] OR;  Service: Orthopedics;  Laterality: Right;       Lab Results   Component Value Date     11/02/2020    K 3.8 11/02/2020     11/02/2020    CO2 29 11/02/2020     (H) 11/02/2020    BUN 24 (H) 11/02/2020    CREATININE 0.71 11/02/2020    CALCIUM 9.3 11/02/2020    PROT 7.3 11/02/2020    ALBUMIN 4.3 11/02/2020    BILITOT 0.9 11/02/2020    ALKPHOS 101 11/02/2020    AST 36 11/02/2020    ALT 29 11/02/2020    ANIONGAP 7 (L) 11/02/2020    ESTGFRAFRICA >60 11/02/2020    EGFRNONAA >60 11/02/2020      Lab Results   Component Value Date    WBC 13.35 (H) 11/02/2020    RBC 4.79 11/02/2020    HGB 14.4 11/02/2020    HCT 41.6 11/02/2020    MCV 87 11/02/2020    MCH 30.1  11/02/2020    MCHC 34.6 11/02/2020    RDW 13.2 11/02/2020     11/02/2020    MPV 9.9 11/02/2020    GRAN 10.6 (H) 11/02/2020    GRAN 79.1 (H) 11/02/2020    LYMPH 1.8 11/02/2020    LYMPH 13.6 (L) 11/02/2020    MONO 0.7 11/02/2020    MONO 5.1 11/02/2020    EOS 0.2 11/02/2020    BASO 0.07 11/02/2020    EOSINOPHIL 1.3 11/02/2020    BASOPHIL 0.5 11/02/2020    DIFFMETHOD Automated 11/02/2020      Lab Results   Component Value Date    MG 1.8 10/03/2017      No results found for: PHOS   Lab Results   Component Value Date    JAZYGVIG26DH 50 11/03/2020      No results found for: PTH   Lab Results   Component Value Date    TSH 1.500 06/13/2020      No results found for: FREET4   No results found for: HGBA1C, ESTIMATEDAVG   No results found for: FREETESTOSTE         Vital Signs (Most Recent)  There were no vitals filed for this visit.      Today's Visit and Bone Health History   she reports a height loss of none.    she was menopausal at the age of 57 by surgical    never took,Hormone replacement therapy    she is not a smoker. she  has been  a smoker in the past   she consumes 0- 2 alcoholic drinks per week.    she consumes 1-3 servings of caffeine per day.   she has suffered 2 falls in the past year.     she states that she will ambulate up to 1 mile daily     There is not  Parental history of hip fracture over the age of 50.      her current fracture occurred from twisting injury.    Previous fractures over 50 include none      she has had a bone density test in the past.    she has been never been  treated  for her bones in the past.    she does take Calcium supplments  she  does Vit D supplments    she denies exposure to high beam radiation, or radioactive implants, no history of elevated calcium levels of Paget's disease.     she has medical history and medication use known to be associated with bone loss and osteoporosis to include none.     The last DXA was performed in .          T-Score Hip: 0.8     T-Score  Spine: 2.3      FRAX:      Major Fx.: 10%         Hip Fx.: 0.4%      Review of Systems:  ROS:  Constitutional: no fever or chills  Eyes: no visual changes  ENT: no nasal congestion or sore throat  Respiratory: no respiratory symptoms  Cardiovascular: no chest pain or palpitations  Gastrointestinal: tolerating diet  Integument/Breast: no rash or pruritis  Hematologic/Lymphatic: no easy bruising or lymphadenopathy  Musculoskeletal: no arthralgias or myalgias  Neurological: no seizures or tremors    OBJECTIVE:     PHYSICAL EXAM:     ,                  General: well developed/well nourished  Behavioral/Psych: normal mood/affect  Skin: no rash and warm  Head/Neck: atraumatic, normocephalic, without obvious abnormality  Respiratory: normal respiratory effort  Vascular:  pulses present  Musculoskeletal: no joint tenderness, deformity or swelling               ASSESSMENT/PLAN:     Assessment:     Current trimalleolar fracture, concern for fragility fracture.    Plan:   30-45 minutes spent in face-to-face consultation with patient and her family members today, discussing the disease management of osteoporosis, for the reduction of future fractures.  I have explained that bone strength is equal to bone quality. A bone density / DEXA scan is important to complement her care since her fracture was by definition a fragility fracture/traumatic fracture.  Over half of the encounter was spent (50%) counseling the patient on the disease of osteoporosis evidence-based and best practice treatment options available as well as recommendations for improvement of bone quality, the importance of nutritional supplements to include:  Calcium 5097-8329 mg daily in divided doses   Vitamin D3  6056-1191 units daily in divided doses.   Fall prevention and personal safety for the reduction of future fractures.    Clinicians Guidelines for the treatment of Osteoporosis 2019 as part of the National Osteoporosis Foundation were utilized as the  evidence-based information provided.    Discussed pharmaceutical treatment options to include Biphosphatases, Denosumab, Abaloparatide and Teriparatide. Information to include indications for therapy, risks and benefits to treatment, and important safety information related to these treatments was provided and discussed.  Handouts were given to the patient for her review on osteoporosis and other pharmacological treatment information related to other available treatment options.    The importance of diet, impact exercise, and core strengthening with gait and balance exercise, through  both formal physical therapy programs and home exercise programs was discussed.     Bone density test recommended and ordered      DEXA ordered,will call with results.

## 2020-12-09 ENCOUNTER — TELEPHONE (OUTPATIENT)
Dept: ORTHOPEDICS | Facility: CLINIC | Age: 75
End: 2020-12-09

## 2020-12-09 DIAGNOSIS — S82.851D CLOSED TRIMALLEOLAR FRACTURE OF RIGHT ANKLE WITH ROUTINE HEALING, SUBSEQUENT ENCOUNTER: Primary | ICD-10-CM

## 2020-12-09 NOTE — TELEPHONE ENCOUNTER
Results reviewed with daughter. Normal bone density on DEXA. Will continue Vit D with increased dietary calcium. Recommended repeat Dexa in 2 years.

## 2020-12-15 ENCOUNTER — HOSPITAL ENCOUNTER (OUTPATIENT)
Dept: RADIOLOGY | Facility: HOSPITAL | Age: 75
Discharge: HOME | End: 2020-12-15
Attending: ORTHOPAEDIC SURGERY
Payer: MEDICARE

## 2020-12-15 ENCOUNTER — OFFICE VISIT (OUTPATIENT)
Dept: ORTHOPEDICS | Facility: CLINIC | Age: 75
End: 2020-12-15
Payer: MEDICARE

## 2020-12-15 VITALS
BODY MASS INDEX: 29.95 KG/M2 | HEIGHT: 63 IN | SYSTOLIC BLOOD PRESSURE: 135 MMHG | DIASTOLIC BLOOD PRESSURE: 76 MMHG | HEART RATE: 53 BPM | WEIGHT: 169 LBS

## 2020-12-15 DIAGNOSIS — S82.851D CLOSED TRIMALLEOLAR FRACTURE OF RIGHT ANKLE WITH ROUTINE HEALING, SUBSEQUENT ENCOUNTER: ICD-10-CM

## 2020-12-15 DIAGNOSIS — S82.851D CLOSED TRIMALLEOLAR FRACTURE OF RIGHT ANKLE WITH ROUTINE HEALING, SUBSEQUENT ENCOUNTER: Primary | ICD-10-CM

## 2020-12-15 PROCEDURE — 73610 XR ANKLE COMPLETE 3 VIEW RIGHT: ICD-10-PCS | Mod: 26,RT,, | Performed by: RADIOLOGY

## 2020-12-15 PROCEDURE — 99024 POSTOP FOLLOW-UP VISIT: CPT | Mod: POP,,, | Performed by: ORTHOPAEDIC SURGERY

## 2020-12-15 PROCEDURE — 99999 PR PBB SHADOW E&M-EST. PATIENT-LVL IV: ICD-10-PCS | Mod: PBBFAC,,, | Performed by: ORTHOPAEDIC SURGERY

## 2020-12-15 PROCEDURE — 73610 X-RAY EXAM OF ANKLE: CPT | Mod: 26,RT,, | Performed by: RADIOLOGY

## 2020-12-15 PROCEDURE — 73610 X-RAY EXAM OF ANKLE: CPT | Mod: TC,PO,RT

## 2020-12-15 PROCEDURE — 99024 PR POST-OP FOLLOW-UP VISIT: ICD-10-PCS | Mod: POP,,, | Performed by: ORTHOPAEDIC SURGERY

## 2020-12-15 PROCEDURE — 99999 PR PBB SHADOW E&M-EST. PATIENT-LVL IV: CPT | Mod: PBBFAC,,, | Performed by: ORTHOPAEDIC SURGERY

## 2020-12-15 PROCEDURE — 99214 OFFICE O/P EST MOD 30 MIN: CPT | Mod: PBBFAC,25,PN | Performed by: ORTHOPAEDIC SURGERY

## 2020-12-15 RX ORDER — SERTRALINE HYDROCHLORIDE 50 MG/1
100 TABLET, FILM COATED ORAL DAILY
COMMUNITY
End: 2021-01-28

## 2020-12-15 NOTE — PROGRESS NOTES
Subjective:      Patient ID: Christine Garcia is a 75 y.o. female.    Chief Complaint: No chief complaint on file.    Mrs. Garcia is doing very well today. She rates her pain as 2/10 today.   DOS: 11/3/20.     Social History     Occupational History    Not on file   Tobacco Use    Smoking status: Former Smoker     Packs/day: 1.00     Years: 20.00     Pack years: 20.00     Types: Cigarettes     Quit date:      Years since quittin.9    Smokeless tobacco: Never Used    Tobacco comment: 1-2ppd   Substance and Sexual Activity    Alcohol use: Yes     Alcohol/week: 1.0 standard drinks     Types: 1 Shots of liquor per week     Frequency: Monthly or less     Drinks per session: 1 or 2     Binge frequency: Never     Comment: occ    Drug use: Never    Sexual activity: Yes     Partners: Male     Birth control/protection: Post-menopausal, None            Objective:    Ortho Exam   RLE: Neurovascularly intact, incisions well healed, mnimal swelling,   No signs of infection.  Moves toes well. No tenderness to palpation at the fracture site laterally minimal  tenderness to palpation medially. Minimally decreased ROM of the ankle.    XRAYS: 3 views of right ankle obtained and reviewed today reveal  Good reduction and alignment. Hardware is intact . There is interval progression of healing.         Assessment:         s/p open reduction internal fixation trimalleolar ankle fracture  Plan:     Weight bearing as tolerated in boot. PT 2/6 ok to transition to a regular shoe. F/u in 6 weeks with xray of the right ankle.

## 2020-12-17 ENCOUNTER — TELEPHONE (OUTPATIENT)
Dept: ORTHOPEDICS | Facility: CLINIC | Age: 75
End: 2020-12-17

## 2020-12-17 NOTE — TELEPHONE ENCOUNTER
Called Ashley back. Per Dr. Rome, ok to discharge. Pt has orders for out patient therapy. Thanks, Saray

## 2020-12-17 NOTE — TELEPHONE ENCOUNTER
----- Message from Estela Vieyra sent at 12/17/2020  3:08 PM CST -----  Contact: Ashley Greco/[facility]  Type: Needs Information    Who Called-Ashley Greco/[facility]  Best Call Back Number-[phone]  Additional Info-caller is stating she is recommending discharge for home health to outpatient PT. Please advise if in agreement.Thank you~

## 2020-12-21 ENCOUNTER — DOCUMENT SCAN (OUTPATIENT)
Dept: HOME HEALTH SERVICES | Facility: HOSPITAL | Age: 75
End: 2020-12-21
Payer: MEDICARE

## 2020-12-22 ENCOUNTER — EXTERNAL HOME HEALTH (OUTPATIENT)
Dept: HOME HEALTH SERVICES | Facility: HOSPITAL | Age: 75
End: 2020-12-22
Payer: MEDICARE

## 2021-01-25 DIAGNOSIS — S82.851D CLOSED TRIMALLEOLAR FRACTURE OF RIGHT ANKLE WITH ROUTINE HEALING, SUBSEQUENT ENCOUNTER: Primary | ICD-10-CM

## 2021-01-26 ENCOUNTER — OFFICE VISIT (OUTPATIENT)
Dept: ORTHOPEDICS | Facility: CLINIC | Age: 76
End: 2021-01-26
Payer: MEDICARE

## 2021-01-26 ENCOUNTER — HOSPITAL ENCOUNTER (OUTPATIENT)
Dept: RADIOLOGY | Facility: HOSPITAL | Age: 76
Discharge: HOME | End: 2021-01-26
Attending: ORTHOPAEDIC SURGERY
Payer: MEDICARE

## 2021-01-26 VITALS
HEIGHT: 63 IN | DIASTOLIC BLOOD PRESSURE: 84 MMHG | SYSTOLIC BLOOD PRESSURE: 142 MMHG | HEART RATE: 53 BPM | WEIGHT: 169.06 LBS | BODY MASS INDEX: 29.95 KG/M2

## 2021-01-26 DIAGNOSIS — S82.851D CLOSED TRIMALLEOLAR FRACTURE OF RIGHT ANKLE WITH ROUTINE HEALING, SUBSEQUENT ENCOUNTER: ICD-10-CM

## 2021-01-26 DIAGNOSIS — S82.851D CLOSED TRIMALLEOLAR FRACTURE OF RIGHT ANKLE WITH ROUTINE HEALING, SUBSEQUENT ENCOUNTER: Primary | ICD-10-CM

## 2021-01-26 PROCEDURE — 73610 X-RAY EXAM OF ANKLE: CPT | Mod: TC,PO,RT

## 2021-01-26 PROCEDURE — 99999 PR PBB SHADOW E&M-EST. PATIENT-LVL III: ICD-10-PCS | Mod: PBBFAC,,, | Performed by: ORTHOPAEDIC SURGERY

## 2021-01-26 PROCEDURE — 99999 PR PBB SHADOW E&M-EST. PATIENT-LVL III: CPT | Mod: PBBFAC,,, | Performed by: ORTHOPAEDIC SURGERY

## 2021-01-26 PROCEDURE — 99024 PR POST-OP FOLLOW-UP VISIT: ICD-10-PCS | Mod: POP,,, | Performed by: ORTHOPAEDIC SURGERY

## 2021-01-26 PROCEDURE — 99024 POSTOP FOLLOW-UP VISIT: CPT | Mod: POP,,, | Performed by: ORTHOPAEDIC SURGERY

## 2021-01-26 PROCEDURE — 73610 X-RAY EXAM OF ANKLE: CPT | Mod: 26,RT,, | Performed by: RADIOLOGY

## 2021-01-26 PROCEDURE — 99213 OFFICE O/P EST LOW 20 MIN: CPT | Mod: PBBFAC,25,PN | Performed by: ORTHOPAEDIC SURGERY

## 2021-01-26 PROCEDURE — 73610 XR ANKLE COMPLETE 3 VIEW RIGHT: ICD-10-PCS | Mod: 26,RT,, | Performed by: RADIOLOGY

## 2021-01-26 RX ORDER — METHYLPHENIDATE HYDROCHLORIDE 5 MG/1
5 TABLET ORAL 2 TIMES DAILY
COMMUNITY
End: 2021-05-04

## 2021-01-27 ENCOUNTER — PATIENT MESSAGE (OUTPATIENT)
Dept: NEUROLOGY | Facility: CLINIC | Age: 76
End: 2021-01-27

## 2021-01-27 ENCOUNTER — TELEPHONE (OUTPATIENT)
Dept: NEUROLOGY | Facility: CLINIC | Age: 76
End: 2021-01-27

## 2021-01-27 DIAGNOSIS — R55 SYNCOPE AND COLLAPSE: ICD-10-CM

## 2021-01-27 DIAGNOSIS — R56.9 SEIZURE-LIKE ACTIVITY: Primary | ICD-10-CM

## 2021-01-28 ENCOUNTER — OFFICE VISIT (OUTPATIENT)
Dept: NEUROLOGY | Facility: CLINIC | Age: 76
End: 2021-01-28
Payer: MEDICARE

## 2021-01-28 VITALS
HEIGHT: 63 IN | HEART RATE: 53 BPM | DIASTOLIC BLOOD PRESSURE: 70 MMHG | RESPIRATION RATE: 18 BRPM | TEMPERATURE: 99 F | SYSTOLIC BLOOD PRESSURE: 127 MMHG | WEIGHT: 172.94 LBS | BODY MASS INDEX: 30.64 KG/M2

## 2021-01-28 DIAGNOSIS — I95.1 ORTHOSTATIC HYPOTENSION: ICD-10-CM

## 2021-01-28 DIAGNOSIS — R40.4 EPISODE OF UNRESPONSIVENESS: ICD-10-CM

## 2021-01-28 DIAGNOSIS — F01.A0 MAJOR NEUROCOGNITIVE DISORDER, DUE TO VASCULAR DISEASE, WITHOUT BEHAVIORAL DISTURBANCE, MILD: Chronic | ICD-10-CM

## 2021-01-28 DIAGNOSIS — R00.1 BRADYCARDIA: ICD-10-CM

## 2021-01-28 DIAGNOSIS — F33.1 MAJOR DEPRESSIVE DISORDER, RECURRENT, MODERATE: Primary | Chronic | ICD-10-CM

## 2021-01-28 PROCEDURE — 99999 PR PBB SHADOW E&M-EST. PATIENT-LVL IV: CPT | Mod: PBBFAC,,, | Performed by: NURSE PRACTITIONER

## 2021-01-28 PROCEDURE — 99214 OFFICE O/P EST MOD 30 MIN: CPT | Mod: PBBFAC,PN | Performed by: NURSE PRACTITIONER

## 2021-01-28 PROCEDURE — 99215 PR OFFICE/OUTPT VISIT, EST, LEVL V, 40-54 MIN: ICD-10-PCS | Mod: S$PBB,,, | Performed by: NURSE PRACTITIONER

## 2021-01-28 PROCEDURE — 99215 OFFICE O/P EST HI 40 MIN: CPT | Mod: S$PBB,,, | Performed by: NURSE PRACTITIONER

## 2021-01-28 PROCEDURE — 99999 PR PBB SHADOW E&M-EST. PATIENT-LVL IV: ICD-10-PCS | Mod: PBBFAC,,, | Performed by: NURSE PRACTITIONER

## 2021-01-28 RX ORDER — DICLOFENAC SODIUM 10 MG/G
GEL TOPICAL
COMMUNITY
End: 2021-06-22

## 2021-01-28 RX ORDER — SERTRALINE HYDROCHLORIDE 100 MG/1
100 TABLET, FILM COATED ORAL DAILY
COMMUNITY
Start: 2020-12-29 | End: 2021-05-04

## 2021-01-29 ENCOUNTER — TELEPHONE (OUTPATIENT)
Dept: NEUROLOGY | Facility: CLINIC | Age: 76
End: 2021-01-29

## 2021-02-01 ENCOUNTER — HOSPITAL ENCOUNTER (OUTPATIENT)
Dept: RADIOLOGY | Facility: HOSPITAL | Age: 76
Discharge: HOME | End: 2021-02-01
Attending: PSYCHIATRY & NEUROLOGY
Payer: MEDICARE

## 2021-02-01 DIAGNOSIS — R55 SYNCOPE AND COLLAPSE: ICD-10-CM

## 2021-02-01 PROCEDURE — 70551 MRI BRAIN STEM W/O DYE: CPT | Mod: 26,,, | Performed by: RADIOLOGY

## 2021-02-01 PROCEDURE — 70551 MRI BRAIN WITHOUT CONTRAST: ICD-10-PCS | Mod: 26,,, | Performed by: RADIOLOGY

## 2021-02-01 PROCEDURE — 70551 MRI BRAIN STEM W/O DYE: CPT | Mod: TC,PO

## 2021-02-12 ENCOUNTER — PATIENT MESSAGE (OUTPATIENT)
Dept: NEUROLOGY | Facility: CLINIC | Age: 76
End: 2021-02-12

## 2021-02-24 ENCOUNTER — PATIENT MESSAGE (OUTPATIENT)
Dept: NEUROLOGY | Facility: CLINIC | Age: 76
End: 2021-02-24

## 2021-03-17 ENCOUNTER — PATIENT MESSAGE (OUTPATIENT)
Dept: NEUROLOGY | Facility: CLINIC | Age: 76
End: 2021-03-17

## 2021-03-17 ENCOUNTER — OFFICE VISIT (OUTPATIENT)
Dept: NEUROLOGY | Facility: CLINIC | Age: 76
End: 2021-03-17
Payer: MEDICARE

## 2021-03-17 VITALS
BODY MASS INDEX: 30 KG/M2 | SYSTOLIC BLOOD PRESSURE: 116 MMHG | WEIGHT: 169.31 LBS | HEIGHT: 63 IN | RESPIRATION RATE: 20 BRPM | TEMPERATURE: 97 F | HEART RATE: 58 BPM | DIASTOLIC BLOOD PRESSURE: 55 MMHG

## 2021-03-17 DIAGNOSIS — F01.A0 MAJOR NEUROCOGNITIVE DISORDER, DUE TO VASCULAR DISEASE, WITHOUT BEHAVIORAL DISTURBANCE, MILD: Primary | ICD-10-CM

## 2021-03-17 PROCEDURE — 99417 PR PROLONGED SVC, OUTPT, W/WO DIRECT PT CONTACT,  EA ADDTL 15 MIN: ICD-10-PCS | Mod: S$PBB,,, | Performed by: PSYCHIATRY & NEUROLOGY

## 2021-03-17 PROCEDURE — 99999 PR PBB SHADOW E&M-EST. PATIENT-LVL V: CPT | Mod: PBBFAC,,, | Performed by: PSYCHIATRY & NEUROLOGY

## 2021-03-17 PROCEDURE — 99999 PR PBB SHADOW E&M-EST. PATIENT-LVL V: ICD-10-PCS | Mod: PBBFAC,,, | Performed by: PSYCHIATRY & NEUROLOGY

## 2021-03-17 PROCEDURE — 99215 OFFICE O/P EST HI 40 MIN: CPT | Mod: S$PBB,,, | Performed by: PSYCHIATRY & NEUROLOGY

## 2021-03-17 PROCEDURE — 99215 OFFICE O/P EST HI 40 MIN: CPT | Mod: PBBFAC,PN | Performed by: PSYCHIATRY & NEUROLOGY

## 2021-03-17 PROCEDURE — 99215 PR OFFICE/OUTPT VISIT, EST, LEVL V, 40-54 MIN: ICD-10-PCS | Mod: S$PBB,,, | Performed by: PSYCHIATRY & NEUROLOGY

## 2021-03-17 PROCEDURE — 99417 PROLNG OP E/M EACH 15 MIN: CPT | Mod: S$PBB,,, | Performed by: PSYCHIATRY & NEUROLOGY

## 2021-03-17 RX ORDER — ASPIRIN 325 MG
50000 TABLET, DELAYED RELEASE (ENTERIC COATED) ORAL
COMMUNITY
Start: 2021-02-09 | End: 2021-10-28

## 2021-03-19 ENCOUNTER — PATIENT MESSAGE (OUTPATIENT)
Dept: NEUROLOGY | Facility: CLINIC | Age: 76
End: 2021-03-19

## 2021-03-25 ENCOUNTER — PATIENT MESSAGE (OUTPATIENT)
Dept: NEUROLOGY | Facility: CLINIC | Age: 76
End: 2021-03-25

## 2021-03-25 DIAGNOSIS — F01.A0 MAJOR NEUROCOGNITIVE DISORDER, DUE TO VASCULAR DISEASE, WITHOUT BEHAVIORAL DISTURBANCE, MILD: Primary | ICD-10-CM

## 2021-03-25 RX ORDER — MEMANTINE HYDROCHLORIDE 5 MG/1
10 TABLET ORAL 2 TIMES DAILY
Qty: 60 TABLET | Refills: 11 | Status: SHIPPED | OUTPATIENT
Start: 2021-03-25 | End: 2021-05-04

## 2021-04-08 ENCOUNTER — PATIENT MESSAGE (OUTPATIENT)
Dept: NEUROLOGY | Facility: CLINIC | Age: 76
End: 2021-04-08

## 2021-04-12 ENCOUNTER — OFFICE VISIT (OUTPATIENT)
Dept: NEUROLOGY | Facility: CLINIC | Age: 76
End: 2021-04-12
Payer: MEDICARE

## 2021-04-12 ENCOUNTER — PATIENT MESSAGE (OUTPATIENT)
Dept: NEUROLOGY | Facility: CLINIC | Age: 76
End: 2021-04-12

## 2021-04-12 DIAGNOSIS — F33.1 MAJOR DEPRESSIVE DISORDER, RECURRENT, MODERATE: Chronic | ICD-10-CM

## 2021-04-12 DIAGNOSIS — F01.A0 MAJOR NEUROCOGNITIVE DISORDER, DUE TO VASCULAR DISEASE, WITHOUT BEHAVIORAL DISTURBANCE, MILD: Primary | Chronic | ICD-10-CM

## 2021-04-12 PROCEDURE — 99499 NO LOS: ICD-10-PCS | Mod: 95,,, | Performed by: PSYCHIATRY & NEUROLOGY

## 2021-04-12 PROCEDURE — 99499 UNLISTED E&M SERVICE: CPT | Mod: 95,,, | Performed by: PSYCHIATRY & NEUROLOGY

## 2021-04-12 PROCEDURE — 90791 PR PSYCHIATRIC DIAGNOSTIC EVALUATION: ICD-10-PCS | Mod: 95,,, | Performed by: PSYCHIATRY & NEUROLOGY

## 2021-04-12 PROCEDURE — 90791 PSYCH DIAGNOSTIC EVALUATION: CPT | Mod: 95,,, | Performed by: PSYCHIATRY & NEUROLOGY

## 2021-04-14 ENCOUNTER — PATIENT MESSAGE (OUTPATIENT)
Dept: NEUROLOGY | Facility: CLINIC | Age: 76
End: 2021-04-14

## 2021-04-22 ENCOUNTER — PATIENT MESSAGE (OUTPATIENT)
Dept: NEUROLOGY | Facility: CLINIC | Age: 76
End: 2021-04-22

## 2021-04-27 ENCOUNTER — PATIENT MESSAGE (OUTPATIENT)
Dept: NEUROLOGY | Facility: CLINIC | Age: 76
End: 2021-04-27

## 2021-04-28 ENCOUNTER — OUTPATIENT CASE MANAGEMENT (OUTPATIENT)
Dept: NEUROLOGY | Facility: CLINIC | Age: 76
End: 2021-04-28

## 2021-04-29 ENCOUNTER — OFFICE VISIT (OUTPATIENT)
Dept: NEUROLOGY | Facility: CLINIC | Age: 76
End: 2021-04-29
Payer: MEDICARE

## 2021-04-29 DIAGNOSIS — F01.A0 MAJOR NEUROCOGNITIVE DISORDER, DUE TO VASCULAR DISEASE, WITHOUT BEHAVIORAL DISTURBANCE, MILD: ICD-10-CM

## 2021-04-29 DIAGNOSIS — F03.91 MAJOR NEUROCOGNITIVE DISORDER WITH LEWY BODIES, PROBABLE, WITH BEHAVIORAL DISTURBANCE: ICD-10-CM

## 2021-04-29 DIAGNOSIS — F33.1 MAJOR DEPRESSIVE DISORDER, RECURRENT, MODERATE: Chronic | ICD-10-CM

## 2021-04-29 PROCEDURE — 96133 NRPSYC TST EVAL PHYS/QHP EA: CPT | Mod: ,,, | Performed by: PSYCHIATRY & NEUROLOGY

## 2021-04-29 PROCEDURE — 96139 PSYCL/NRPSYC TST TECH EA: CPT | Mod: ,,, | Performed by: PSYCHIATRY & NEUROLOGY

## 2021-04-29 PROCEDURE — 96138 PR PSYCH/NEUROPSYCH TEST ADMIN/SCORING, BY TECH, 2+ TESTS, 1ST 30 MIN: ICD-10-PCS | Mod: ,,, | Performed by: PSYCHIATRY & NEUROLOGY

## 2021-04-29 PROCEDURE — 99499 UNLISTED E&M SERVICE: CPT | Mod: S$PBB,,, | Performed by: PSYCHIATRY & NEUROLOGY

## 2021-04-29 PROCEDURE — 96132 PR NEUROPSYCHOLOGIC TEST EVAL SVCS, 1ST HR: ICD-10-PCS | Mod: ,,, | Performed by: PSYCHIATRY & NEUROLOGY

## 2021-04-29 PROCEDURE — 99212 OFFICE O/P EST SF 10 MIN: CPT | Mod: PBBFAC,PO | Performed by: PSYCHIATRY & NEUROLOGY

## 2021-04-29 PROCEDURE — 96133 PR NEUROPSYCHOLOGIC TEST EVAL SVCS, EA ADDTL HR: ICD-10-PCS | Mod: ,,, | Performed by: PSYCHIATRY & NEUROLOGY

## 2021-04-29 PROCEDURE — 99999 PR PBB SHADOW E&M-EST. PATIENT-LVL II: ICD-10-PCS | Mod: PBBFAC,,, | Performed by: PSYCHIATRY & NEUROLOGY

## 2021-04-29 PROCEDURE — 96139 PR PSYCH/NEUROPSYCH TEST ADMIN/SCORING, BY TECH, 2+ TESTS, EA ADDTL 30 MIN: ICD-10-PCS | Mod: ,,, | Performed by: PSYCHIATRY & NEUROLOGY

## 2021-04-29 PROCEDURE — 99499 NO LOS: ICD-10-PCS | Mod: S$PBB,,, | Performed by: PSYCHIATRY & NEUROLOGY

## 2021-04-29 PROCEDURE — 96138 PSYCL/NRPSYC TECH 1ST: CPT | Mod: ,,, | Performed by: PSYCHIATRY & NEUROLOGY

## 2021-04-29 PROCEDURE — 99999 PR PBB SHADOW E&M-EST. PATIENT-LVL II: CPT | Mod: PBBFAC,,, | Performed by: PSYCHIATRY & NEUROLOGY

## 2021-04-29 PROCEDURE — 96132 NRPSYC TST EVAL PHYS/QHP 1ST: CPT | Mod: ,,, | Performed by: PSYCHIATRY & NEUROLOGY

## 2021-05-03 ENCOUNTER — PATIENT MESSAGE (OUTPATIENT)
Dept: NEUROLOGY | Facility: CLINIC | Age: 76
End: 2021-05-03

## 2021-05-05 PROBLEM — F03.91: Status: ACTIVE | Noted: 2020-03-31

## 2021-05-05 PROBLEM — F02.80 LEWY BODY DEMENTIA WITHOUT BEHAVIORAL DISTURBANCE: Status: ACTIVE | Noted: 2021-05-05

## 2021-05-05 PROBLEM — G31.83 LEWY BODY DEMENTIA WITHOUT BEHAVIORAL DISTURBANCE: Status: ACTIVE | Noted: 2021-05-05

## 2021-05-10 ENCOUNTER — TELEPHONE (OUTPATIENT)
Dept: NEUROLOGY | Facility: CLINIC | Age: 76
End: 2021-05-10

## 2021-05-13 ENCOUNTER — OFFICE VISIT (OUTPATIENT)
Dept: NEUROLOGY | Facility: CLINIC | Age: 76
End: 2021-05-13
Payer: MEDICARE

## 2021-05-13 DIAGNOSIS — F03.91 MAJOR NEUROCOGNITIVE DISORDER WITH LEWY BODIES, PROBABLE, WITH BEHAVIORAL DISTURBANCE: Primary | ICD-10-CM

## 2021-05-13 DIAGNOSIS — F33.1 MAJOR DEPRESSIVE DISORDER, RECURRENT, MODERATE: Chronic | ICD-10-CM

## 2021-05-13 PROCEDURE — 99499 NO LOS: ICD-10-PCS | Mod: 95,,, | Performed by: PSYCHIATRY & NEUROLOGY

## 2021-05-13 PROCEDURE — 99499 UNLISTED E&M SERVICE: CPT | Mod: 95,,, | Performed by: PSYCHIATRY & NEUROLOGY

## 2021-05-17 ENCOUNTER — OUTPATIENT CASE MANAGEMENT (OUTPATIENT)
Dept: NEUROLOGY | Facility: CLINIC | Age: 76
End: 2021-05-17
Payer: MEDICARE

## 2021-05-17 DIAGNOSIS — G31.83 LEWY BODY DEMENTIA WITHOUT BEHAVIORAL DISTURBANCE: ICD-10-CM

## 2021-05-17 DIAGNOSIS — F02.80 LEWY BODY DEMENTIA WITHOUT BEHAVIORAL DISTURBANCE: ICD-10-CM

## 2021-05-17 PROCEDURE — G2065 CLIN MANG H RISK DX 30: HCPCS | Mod: S$PBB,,, | Performed by: PSYCHIATRY & NEUROLOGY

## 2021-05-17 PROCEDURE — G2065 PR CHRON CARE MGMT, COMPR, HIGH RISK DX, CLIN STAFF, 30 MINS: ICD-10-PCS | Mod: S$PBB,,, | Performed by: PSYCHIATRY & NEUROLOGY

## 2021-05-17 RX ORDER — VENLAFAXINE 25 MG/1
75 TABLET ORAL DAILY
COMMUNITY
End: 2021-06-22

## 2021-05-25 ENCOUNTER — TELEPHONE (OUTPATIENT)
Dept: NEUROLOGY | Facility: CLINIC | Age: 76
End: 2021-05-25

## 2021-06-22 ENCOUNTER — TELEPHONE (OUTPATIENT)
Dept: NEUROLOGY | Facility: CLINIC | Age: 76
End: 2021-06-22

## 2021-06-22 ENCOUNTER — PATIENT MESSAGE (OUTPATIENT)
Dept: NEUROLOGY | Facility: CLINIC | Age: 76
End: 2021-06-22

## 2021-06-22 ENCOUNTER — OFFICE VISIT (OUTPATIENT)
Dept: NEUROLOGY | Facility: CLINIC | Age: 76
End: 2021-06-22
Payer: MEDICARE

## 2021-06-22 VITALS
DIASTOLIC BLOOD PRESSURE: 81 MMHG | BODY MASS INDEX: 31.31 KG/M2 | HEART RATE: 55 BPM | WEIGHT: 176.69 LBS | RESPIRATION RATE: 17 BRPM | SYSTOLIC BLOOD PRESSURE: 139 MMHG | HEIGHT: 63 IN

## 2021-06-22 DIAGNOSIS — F02.818 LEWY BODY DEMENTIA WITH BEHAVIORAL DISTURBANCE: Primary | ICD-10-CM

## 2021-06-22 DIAGNOSIS — R25.9 ABNORMAL MOVEMENT: ICD-10-CM

## 2021-06-22 DIAGNOSIS — G31.83 LEWY BODY DEMENTIA WITH BEHAVIORAL DISTURBANCE: Primary | ICD-10-CM

## 2021-06-22 PROCEDURE — 99213 OFFICE O/P EST LOW 20 MIN: CPT | Mod: PBBFAC,PN | Performed by: PSYCHIATRY & NEUROLOGY

## 2021-06-22 PROCEDURE — 99215 PR OFFICE/OUTPT VISIT, EST, LEVL V, 40-54 MIN: ICD-10-PCS | Mod: S$PBB,,, | Performed by: PSYCHIATRY & NEUROLOGY

## 2021-06-22 PROCEDURE — 99999 PR PBB SHADOW E&M-EST. PATIENT-LVL III: CPT | Mod: PBBFAC,,, | Performed by: PSYCHIATRY & NEUROLOGY

## 2021-06-22 PROCEDURE — 99999 PR PBB SHADOW E&M-EST. PATIENT-LVL III: ICD-10-PCS | Mod: PBBFAC,,, | Performed by: PSYCHIATRY & NEUROLOGY

## 2021-06-22 PROCEDURE — 99215 OFFICE O/P EST HI 40 MIN: CPT | Mod: S$PBB,,, | Performed by: PSYCHIATRY & NEUROLOGY

## 2021-06-23 ENCOUNTER — OUTPATIENT CASE MANAGEMENT (OUTPATIENT)
Dept: NEUROLOGY | Facility: CLINIC | Age: 76
End: 2021-06-23
Payer: MEDICARE

## 2021-06-23 ENCOUNTER — TELEPHONE (OUTPATIENT)
Dept: NEUROLOGY | Facility: CLINIC | Age: 76
End: 2021-06-23

## 2021-06-23 DIAGNOSIS — F02.80 LEWY BODY DEMENTIA WITHOUT BEHAVIORAL DISTURBANCE: ICD-10-CM

## 2021-06-23 DIAGNOSIS — G31.83 LEWY BODY DEMENTIA WITHOUT BEHAVIORAL DISTURBANCE: ICD-10-CM

## 2021-06-23 PROCEDURE — G2065 PR CHRON CARE MGMT, COMPR, HIGH RISK DX, CLIN STAFF, 30 MINS: ICD-10-PCS | Mod: S$PBB,,, | Performed by: PSYCHIATRY & NEUROLOGY

## 2021-06-23 PROCEDURE — G2065 CLIN MANG H RISK DX 30: HCPCS | Mod: S$PBB,,, | Performed by: PSYCHIATRY & NEUROLOGY

## 2021-07-06 ENCOUNTER — TELEPHONE (OUTPATIENT)
Dept: NEUROLOGY | Facility: CLINIC | Age: 76
End: 2021-07-06

## 2021-07-07 ENCOUNTER — PATIENT MESSAGE (OUTPATIENT)
Dept: NEUROLOGY | Facility: CLINIC | Age: 76
End: 2021-07-07

## 2021-07-09 ENCOUNTER — PATIENT MESSAGE (OUTPATIENT)
Dept: NEUROLOGY | Facility: CLINIC | Age: 76
End: 2021-07-09

## 2021-07-15 ENCOUNTER — OUTPATIENT CASE MANAGEMENT (OUTPATIENT)
Dept: NEUROLOGY | Facility: CLINIC | Age: 76
End: 2021-07-15
Payer: MEDICARE

## 2021-07-15 DIAGNOSIS — F02.80 LEWY BODY DEMENTIA WITHOUT BEHAVIORAL DISTURBANCE: ICD-10-CM

## 2021-07-15 DIAGNOSIS — G31.83 LEWY BODY DEMENTIA WITHOUT BEHAVIORAL DISTURBANCE: ICD-10-CM

## 2021-07-15 PROCEDURE — G2065 CLIN MANG H RISK DX 30: HCPCS | Mod: S$PBB,,, | Performed by: PSYCHIATRY & NEUROLOGY

## 2021-07-15 PROCEDURE — G2065 PR CHRON CARE MGMT, COMPR, HIGH RISK DX, CLIN STAFF, 30 MINS: ICD-10-PCS | Mod: S$PBB,,, | Performed by: PSYCHIATRY & NEUROLOGY

## 2021-07-15 RX ORDER — QUETIAPINE FUMARATE 25 MG/1
TABLET, FILM COATED ORAL
COMMUNITY
Start: 2021-06-23 | End: 2021-08-24

## 2021-07-16 ENCOUNTER — PATIENT MESSAGE (OUTPATIENT)
Dept: NEUROLOGY | Facility: CLINIC | Age: 76
End: 2021-07-16

## 2021-07-16 ENCOUNTER — TELEPHONE (OUTPATIENT)
Dept: NEUROLOGY | Facility: CLINIC | Age: 76
End: 2021-07-16

## 2021-07-20 ENCOUNTER — TELEPHONE (OUTPATIENT)
Dept: NEUROLOGY | Facility: CLINIC | Age: 76
End: 2021-07-20

## 2021-07-20 ENCOUNTER — PATIENT MESSAGE (OUTPATIENT)
Dept: NEUROLOGY | Facility: CLINIC | Age: 76
End: 2021-07-20

## 2021-07-22 ENCOUNTER — TELEPHONE (OUTPATIENT)
Dept: NEUROLOGY | Facility: CLINIC | Age: 76
End: 2021-07-22

## 2021-07-23 ENCOUNTER — TELEPHONE (OUTPATIENT)
Dept: NEUROLOGY | Facility: CLINIC | Age: 76
End: 2021-07-23

## 2021-07-26 ENCOUNTER — PATIENT MESSAGE (OUTPATIENT)
Dept: NEUROLOGY | Facility: CLINIC | Age: 76
End: 2021-07-26

## 2021-07-27 RX ORDER — DONEPEZIL HYDROCHLORIDE 10 MG/1
10 TABLET, FILM COATED ORAL NIGHTLY
Qty: 30 TABLET | Refills: 11 | Status: SHIPPED | OUTPATIENT
Start: 2021-07-27 | End: 2022-07-05 | Stop reason: SDUPTHER

## 2021-08-03 ENCOUNTER — PATIENT MESSAGE (OUTPATIENT)
Dept: NEUROLOGY | Facility: CLINIC | Age: 76
End: 2021-08-03

## 2021-08-04 ENCOUNTER — PATIENT MESSAGE (OUTPATIENT)
Dept: NEUROLOGY | Facility: CLINIC | Age: 76
End: 2021-08-04

## 2021-08-12 ENCOUNTER — OUTPATIENT CASE MANAGEMENT (OUTPATIENT)
Dept: NEUROLOGY | Facility: CLINIC | Age: 76
End: 2021-08-12

## 2021-08-24 ENCOUNTER — OFFICE VISIT (OUTPATIENT)
Dept: NEUROLOGY | Facility: CLINIC | Age: 76
End: 2021-08-24
Payer: MEDICARE

## 2021-08-24 VITALS
HEART RATE: 56 BPM | BODY MASS INDEX: 31.87 KG/M2 | WEIGHT: 179.88 LBS | DIASTOLIC BLOOD PRESSURE: 81 MMHG | HEIGHT: 63 IN | SYSTOLIC BLOOD PRESSURE: 124 MMHG

## 2021-08-24 DIAGNOSIS — Z71.89 COUNSELING REGARDING GOALS OF CARE: ICD-10-CM

## 2021-08-24 DIAGNOSIS — G31.09 FRONTOTEMPORAL DEMENTIA: ICD-10-CM

## 2021-08-24 DIAGNOSIS — R26.81 GAIT INSTABILITY: ICD-10-CM

## 2021-08-24 DIAGNOSIS — I95.1 ORTHOSTATIC HYPOTENSION: ICD-10-CM

## 2021-08-24 DIAGNOSIS — F03.90 MAJOR NEUROCOGNITIVE DISORDER: Primary | ICD-10-CM

## 2021-08-24 DIAGNOSIS — F02.80 FRONTOTEMPORAL DEMENTIA: ICD-10-CM

## 2021-08-24 DIAGNOSIS — F05 SUNDOWNING: ICD-10-CM

## 2021-08-24 DIAGNOSIS — F33.1 MAJOR DEPRESSIVE DISORDER, RECURRENT, MODERATE: ICD-10-CM

## 2021-08-24 PROCEDURE — 99999 PR PBB SHADOW E&M-EST. PATIENT-LVL V: ICD-10-PCS | Mod: PBBFAC,,, | Performed by: PHYSICIAN ASSISTANT

## 2021-08-24 PROCEDURE — 99215 OFFICE O/P EST HI 40 MIN: CPT | Mod: PBBFAC | Performed by: PHYSICIAN ASSISTANT

## 2021-08-24 PROCEDURE — 99215 PR OFFICE/OUTPT VISIT, EST, LEVL V, 40-54 MIN: ICD-10-PCS | Mod: S$PBB,,, | Performed by: PHYSICIAN ASSISTANT

## 2021-08-24 PROCEDURE — 99215 OFFICE O/P EST HI 40 MIN: CPT | Mod: S$PBB,,, | Performed by: PHYSICIAN ASSISTANT

## 2021-08-24 PROCEDURE — 99999 PR PBB SHADOW E&M-EST. PATIENT-LVL V: CPT | Mod: PBBFAC,,, | Performed by: PHYSICIAN ASSISTANT

## 2021-08-25 ENCOUNTER — TELEPHONE (OUTPATIENT)
Dept: NEUROLOGY | Facility: CLINIC | Age: 76
End: 2021-08-25

## 2021-09-03 ENCOUNTER — PATIENT MESSAGE (OUTPATIENT)
Dept: NEUROLOGY | Facility: CLINIC | Age: 76
End: 2021-09-03

## 2021-09-09 ENCOUNTER — TELEPHONE (OUTPATIENT)
Dept: NEUROLOGY | Facility: CLINIC | Age: 76
End: 2021-09-09

## 2021-09-10 ENCOUNTER — OUTPATIENT CASE MANAGEMENT (OUTPATIENT)
Dept: NEUROLOGY | Facility: CLINIC | Age: 76
End: 2021-09-10
Payer: MEDICARE

## 2021-09-10 DIAGNOSIS — G31.09 FRONTOTEMPORAL DEMENTIA: ICD-10-CM

## 2021-09-10 DIAGNOSIS — F02.80 FRONTOTEMPORAL DEMENTIA: ICD-10-CM

## 2021-09-10 PROCEDURE — G2065 PR CHRON CARE MGMT, COMPR, HIGH RISK DX, CLIN STAFF, 30 MINS: ICD-10-PCS | Mod: S$PBB,,, | Performed by: PSYCHIATRY & NEUROLOGY

## 2021-09-10 PROCEDURE — G2065 CLIN MANG H RISK DX 30: HCPCS | Mod: S$PBB,,, | Performed by: PSYCHIATRY & NEUROLOGY

## 2021-09-12 ENCOUNTER — OUTPATIENT CASE MANAGEMENT (OUTPATIENT)
Dept: NEUROLOGY | Facility: CLINIC | Age: 76
End: 2021-09-12
Payer: MEDICARE

## 2021-09-12 DIAGNOSIS — G31.83 LEWY BODY DEMENTIA WITH BEHAVIORAL DISTURBANCE: ICD-10-CM

## 2021-09-12 DIAGNOSIS — F02.818 LEWY BODY DEMENTIA WITH BEHAVIORAL DISTURBANCE: ICD-10-CM

## 2021-09-12 PROCEDURE — 99358 PR PROLONGED SERV,NO CONTACT,1ST HR: ICD-10-PCS | Mod: S$PBB,,, | Performed by: PSYCHIATRY & NEUROLOGY

## 2021-09-12 PROCEDURE — 99359 PR PROLONGED SERV,NO CONTCT,EA ADD 30 MIN: ICD-10-PCS | Mod: S$PBB,,, | Performed by: PSYCHIATRY & NEUROLOGY

## 2021-09-12 PROCEDURE — 99358 PROLONG SERVICE W/O CONTACT: CPT | Mod: S$PBB,,, | Performed by: PSYCHIATRY & NEUROLOGY

## 2021-09-12 PROCEDURE — 99359 PROLONG SERV W/O CONTACT ADD: CPT | Mod: S$PBB,,, | Performed by: PSYCHIATRY & NEUROLOGY

## 2021-09-13 ENCOUNTER — PATIENT MESSAGE (OUTPATIENT)
Dept: NEUROLOGY | Facility: CLINIC | Age: 76
End: 2021-09-13

## 2021-09-13 ENCOUNTER — TELEPHONE (OUTPATIENT)
Dept: NEUROLOGY | Facility: CLINIC | Age: 76
End: 2021-09-13

## 2021-09-16 ENCOUNTER — PATIENT MESSAGE (OUTPATIENT)
Dept: NEUROLOGY | Facility: CLINIC | Age: 76
End: 2021-09-16

## 2021-09-16 ENCOUNTER — OFFICE VISIT (OUTPATIENT)
Dept: NEUROLOGY | Facility: CLINIC | Age: 76
End: 2021-09-16
Payer: MEDICARE

## 2021-09-16 VITALS
HEART RATE: 66 BPM | WEIGHT: 185.94 LBS | SYSTOLIC BLOOD PRESSURE: 157 MMHG | BODY MASS INDEX: 32.94 KG/M2 | DIASTOLIC BLOOD PRESSURE: 85 MMHG

## 2021-09-16 DIAGNOSIS — G31.09 FRONTOTEMPORAL DEMENTIA: ICD-10-CM

## 2021-09-16 DIAGNOSIS — F02.818 LEWY BODY DEMENTIA WITH BEHAVIORAL DISTURBANCE: ICD-10-CM

## 2021-09-16 DIAGNOSIS — G24.9 DYSKINESIA: ICD-10-CM

## 2021-09-16 DIAGNOSIS — G31.83 LEWY BODY DEMENTIA WITH BEHAVIORAL DISTURBANCE: ICD-10-CM

## 2021-09-16 DIAGNOSIS — F03.91 DEMENTIA WITH BEHAVIORAL DISTURBANCE, UNSPECIFIED DEMENTIA TYPE: Primary | ICD-10-CM

## 2021-09-16 DIAGNOSIS — F03.90 MAJOR NEUROCOGNITIVE DISORDER: ICD-10-CM

## 2021-09-16 DIAGNOSIS — G25.71 AKATHISIA: ICD-10-CM

## 2021-09-16 DIAGNOSIS — T88.7XXA MEDICATION SIDE EFFECT: ICD-10-CM

## 2021-09-16 DIAGNOSIS — F30.8 HYPOMANIA: ICD-10-CM

## 2021-09-16 DIAGNOSIS — F02.80 FRONTOTEMPORAL DEMENTIA: ICD-10-CM

## 2021-09-16 DIAGNOSIS — R44.3 HALLUCINATIONS: ICD-10-CM

## 2021-09-16 PROCEDURE — 96116 NUBHVL XM PHYS/QHP 1ST HR: CPT | Mod: S$PBB,,, | Performed by: PSYCHIATRY & NEUROLOGY

## 2021-09-16 PROCEDURE — 99215 PR OFFICE/OUTPT VISIT, EST, LEVL V, 40-54 MIN: ICD-10-PCS | Mod: 25,S$PBB,, | Performed by: PSYCHIATRY & NEUROLOGY

## 2021-09-16 PROCEDURE — 99417 PR PROLONGED SVC, OUTPT, W/WO DIRECT PT CONTACT,  EA ADDTL 15 MIN: ICD-10-PCS | Mod: S$PBB,,, | Performed by: PSYCHIATRY & NEUROLOGY

## 2021-09-16 PROCEDURE — 99999 PR PBB SHADOW E&M-EST. PATIENT-LVL IV: ICD-10-PCS | Mod: PBBFAC,,, | Performed by: PSYCHIATRY & NEUROLOGY

## 2021-09-16 PROCEDURE — 99214 OFFICE O/P EST MOD 30 MIN: CPT | Mod: PBBFAC | Performed by: PSYCHIATRY & NEUROLOGY

## 2021-09-16 PROCEDURE — 96116 PR NEUROBEHAVIORAL STATUS EXAM BY PSYCH/PHYS: ICD-10-PCS | Mod: S$PBB,,, | Performed by: PSYCHIATRY & NEUROLOGY

## 2021-09-16 PROCEDURE — 99999 PR PBB SHADOW E&M-EST. PATIENT-LVL IV: CPT | Mod: PBBFAC,,, | Performed by: PSYCHIATRY & NEUROLOGY

## 2021-09-16 PROCEDURE — 99215 OFFICE O/P EST HI 40 MIN: CPT | Mod: 25,S$PBB,, | Performed by: PSYCHIATRY & NEUROLOGY

## 2021-09-16 PROCEDURE — 96116 NUBHVL XM PHYS/QHP 1ST HR: CPT | Mod: PBBFAC | Performed by: PSYCHIATRY & NEUROLOGY

## 2021-09-16 PROCEDURE — 99417 PROLNG OP E/M EACH 15 MIN: CPT | Mod: S$PBB,,, | Performed by: PSYCHIATRY & NEUROLOGY

## 2021-09-16 RX ORDER — VENLAFAXINE HYDROCHLORIDE 37.5 MG/1
37.5 CAPSULE, EXTENDED RELEASE ORAL DAILY
Qty: 30 CAPSULE | Refills: 0 | Status: SHIPPED | OUTPATIENT
Start: 2021-09-16 | End: 2021-09-16

## 2021-09-16 RX ORDER — VENLAFAXINE HYDROCHLORIDE 37.5 MG/1
37.5 CAPSULE, EXTENDED RELEASE ORAL DAILY
Qty: 30 CAPSULE | Refills: 0 | Status: SHIPPED | OUTPATIENT
Start: 2021-09-16 | End: 2021-10-09

## 2021-09-23 ENCOUNTER — PATIENT MESSAGE (OUTPATIENT)
Dept: NEUROLOGY | Facility: CLINIC | Age: 76
End: 2021-09-23

## 2021-09-27 ENCOUNTER — PATIENT MESSAGE (OUTPATIENT)
Dept: NEUROLOGY | Facility: CLINIC | Age: 76
End: 2021-09-27

## 2021-10-12 ENCOUNTER — OUTPATIENT CASE MANAGEMENT (OUTPATIENT)
Dept: NEUROLOGY | Facility: CLINIC | Age: 76
End: 2021-10-12
Payer: MEDICARE

## 2021-10-12 ENCOUNTER — TELEPHONE (OUTPATIENT)
Dept: PRIMARY CARE CLINIC | Facility: CLINIC | Age: 76
End: 2021-10-12

## 2021-10-12 DIAGNOSIS — F02.80 FRONTOTEMPORAL DEMENTIA: ICD-10-CM

## 2021-10-12 DIAGNOSIS — G31.09 FRONTOTEMPORAL DEMENTIA: ICD-10-CM

## 2021-10-12 PROCEDURE — G2065 PR CHRON CARE MGMT, COMPR, HIGH RISK DX, CLIN STAFF, 30 MINS: ICD-10-PCS | Mod: S$PBB,,, | Performed by: PSYCHIATRY & NEUROLOGY

## 2021-10-12 PROCEDURE — G2065 CLIN MANG H RISK DX 30: HCPCS | Mod: S$PBB,,, | Performed by: PSYCHIATRY & NEUROLOGY

## 2021-10-14 ENCOUNTER — PATIENT MESSAGE (OUTPATIENT)
Dept: NEUROLOGY | Facility: CLINIC | Age: 76
End: 2021-10-14
Payer: MEDICARE

## 2021-10-22 ENCOUNTER — OFFICE VISIT (OUTPATIENT)
Dept: NEUROLOGY | Facility: CLINIC | Age: 76
End: 2021-10-22
Payer: MEDICARE

## 2021-10-22 VITALS
WEIGHT: 186.06 LBS | DIASTOLIC BLOOD PRESSURE: 82 MMHG | HEART RATE: 74 BPM | BODY MASS INDEX: 32.97 KG/M2 | SYSTOLIC BLOOD PRESSURE: 142 MMHG | HEIGHT: 63 IN

## 2021-10-22 DIAGNOSIS — F33.1 MAJOR DEPRESSIVE DISORDER, RECURRENT, MODERATE: Chronic | ICD-10-CM

## 2021-10-22 DIAGNOSIS — G25.71 AKATHISIA: ICD-10-CM

## 2021-10-22 DIAGNOSIS — R44.3 HALLUCINATIONS: ICD-10-CM

## 2021-10-22 DIAGNOSIS — F02.80 FRONTOTEMPORAL DEMENTIA: Primary | ICD-10-CM

## 2021-10-22 DIAGNOSIS — G31.09 FRONTOTEMPORAL DEMENTIA: Primary | ICD-10-CM

## 2021-10-22 DIAGNOSIS — F03.90 MAJOR NEUROCOGNITIVE DISORDER: ICD-10-CM

## 2021-10-22 PROCEDURE — 99215 OFFICE O/P EST HI 40 MIN: CPT | Mod: S$PBB,,, | Performed by: PHYSICIAN ASSISTANT

## 2021-10-22 PROCEDURE — 99999 PR PBB SHADOW E&M-EST. PATIENT-LVL IV: CPT | Mod: PBBFAC,,, | Performed by: PHYSICIAN ASSISTANT

## 2021-10-22 PROCEDURE — 99214 OFFICE O/P EST MOD 30 MIN: CPT | Mod: PBBFAC | Performed by: PHYSICIAN ASSISTANT

## 2021-10-22 PROCEDURE — 99215 PR OFFICE/OUTPT VISIT, EST, LEVL V, 40-54 MIN: ICD-10-PCS | Mod: S$PBB,,, | Performed by: PHYSICIAN ASSISTANT

## 2021-10-22 PROCEDURE — 99999 PR PBB SHADOW E&M-EST. PATIENT-LVL IV: ICD-10-PCS | Mod: PBBFAC,,, | Performed by: PHYSICIAN ASSISTANT

## 2021-10-22 RX ORDER — OLANZAPINE 5 MG/1
TABLET ORAL
COMMUNITY
Start: 2021-10-06 | End: 2021-10-28

## 2021-10-25 ENCOUNTER — PATIENT MESSAGE (OUTPATIENT)
Dept: NEUROLOGY | Facility: CLINIC | Age: 76
End: 2021-10-25
Payer: MEDICARE

## 2021-10-28 ENCOUNTER — LAB VISIT (OUTPATIENT)
Dept: LAB | Facility: HOSPITAL | Age: 76
End: 2021-10-28
Attending: PSYCHIATRY & NEUROLOGY
Payer: MEDICARE

## 2021-10-28 ENCOUNTER — PATIENT MESSAGE (OUTPATIENT)
Dept: NEUROLOGY | Facility: CLINIC | Age: 76
End: 2021-10-28

## 2021-10-28 ENCOUNTER — PATIENT MESSAGE (OUTPATIENT)
Dept: NEUROLOGY | Facility: CLINIC | Age: 76
End: 2021-10-28
Payer: MEDICARE

## 2021-10-28 ENCOUNTER — OFFICE VISIT (OUTPATIENT)
Dept: NEUROLOGY | Facility: CLINIC | Age: 76
End: 2021-10-28
Payer: MEDICARE

## 2021-10-28 VITALS
SYSTOLIC BLOOD PRESSURE: 135 MMHG | WEIGHT: 193.44 LBS | BODY MASS INDEX: 34.27 KG/M2 | DIASTOLIC BLOOD PRESSURE: 85 MMHG | HEART RATE: 60 BPM

## 2021-10-28 DIAGNOSIS — E55.9 VITAMIN D DEFICIENCY: ICD-10-CM

## 2021-10-28 DIAGNOSIS — G31.09 OTHER FRONTOTEMPORAL DEMENTIA WITH BEHAVIORAL DISTURBANCE: Primary | ICD-10-CM

## 2021-10-28 DIAGNOSIS — E74.819 DISORDERS OF GLUCOSE TRANSPORT, UNSPECIFIED: ICD-10-CM

## 2021-10-28 DIAGNOSIS — E78.5 HYPERLIPIDEMIA, UNSPECIFIED HYPERLIPIDEMIA TYPE: ICD-10-CM

## 2021-10-28 DIAGNOSIS — F02.818 OTHER FRONTOTEMPORAL DEMENTIA WITH BEHAVIORAL DISTURBANCE: Primary | ICD-10-CM

## 2021-10-28 LAB
25(OH)D3+25(OH)D2 SERPL-MCNC: 84 NG/ML (ref 30–96)
CHOLEST SERPL-MCNC: 177 MG/DL (ref 120–199)
CHOLEST/HDLC SERPL: 3.5 {RATIO} (ref 2–5)
ESTIMATED AVG GLUCOSE: 103 MG/DL (ref 68–131)
HBA1C MFR BLD: 5.2 % (ref 4–5.6)
HDLC SERPL-MCNC: 51 MG/DL (ref 40–75)
HDLC SERPL: 28.8 % (ref 20–50)
LDLC SERPL CALC-MCNC: 69.8 MG/DL (ref 63–159)
NONHDLC SERPL-MCNC: 126 MG/DL
TRIGL SERPL-MCNC: 281 MG/DL (ref 30–150)

## 2021-10-28 PROCEDURE — 96116 NUBHVL XM PHYS/QHP 1ST HR: CPT | Mod: PBBFAC | Performed by: PSYCHIATRY & NEUROLOGY

## 2021-10-28 PROCEDURE — 96116 NUBHVL XM PHYS/QHP 1ST HR: CPT | Mod: 59,S$PBB,, | Performed by: PSYCHIATRY & NEUROLOGY

## 2021-10-28 PROCEDURE — 99999 PR PBB SHADOW E&M-EST. PATIENT-LVL III: ICD-10-PCS | Mod: PBBFAC,,, | Performed by: PSYCHIATRY & NEUROLOGY

## 2021-10-28 PROCEDURE — 99215 PR OFFICE/OUTPT VISIT, EST, LEVL V, 40-54 MIN: ICD-10-PCS | Mod: 25,S$PBB,, | Performed by: PSYCHIATRY & NEUROLOGY

## 2021-10-28 PROCEDURE — 83036 HEMOGLOBIN GLYCOSYLATED A1C: CPT | Performed by: PSYCHIATRY & NEUROLOGY

## 2021-10-28 PROCEDURE — 99213 OFFICE O/P EST LOW 20 MIN: CPT | Mod: PBBFAC,25 | Performed by: PSYCHIATRY & NEUROLOGY

## 2021-10-28 PROCEDURE — 96116 PR NEUROBEHAVIORAL STATUS EXAM BY PSYCH/PHYS: ICD-10-PCS | Mod: 59,S$PBB,, | Performed by: PSYCHIATRY & NEUROLOGY

## 2021-10-28 PROCEDURE — 80061 LIPID PANEL: CPT | Performed by: PSYCHIATRY & NEUROLOGY

## 2021-10-28 PROCEDURE — 99215 OFFICE O/P EST HI 40 MIN: CPT | Mod: 25,S$PBB,, | Performed by: PSYCHIATRY & NEUROLOGY

## 2021-10-28 PROCEDURE — 82306 VITAMIN D 25 HYDROXY: CPT | Performed by: PSYCHIATRY & NEUROLOGY

## 2021-10-28 PROCEDURE — 36415 COLL VENOUS BLD VENIPUNCTURE: CPT | Performed by: PSYCHIATRY & NEUROLOGY

## 2021-10-28 PROCEDURE — 99999 PR PBB SHADOW E&M-EST. PATIENT-LVL III: CPT | Mod: PBBFAC,,, | Performed by: PSYCHIATRY & NEUROLOGY

## 2021-10-28 RX ORDER — RAMELTEON 8 MG/1
8 TABLET ORAL NIGHTLY
Qty: 60 TABLET | Refills: 3 | Status: SHIPPED | OUTPATIENT
Start: 2021-10-28 | End: 2021-11-24 | Stop reason: SDUPTHER

## 2021-10-28 RX ORDER — GABAPENTIN 100 MG/1
100 CAPSULE ORAL NIGHTLY
Qty: 30 CAPSULE | Refills: 3 | Status: SHIPPED | OUTPATIENT
Start: 2021-10-28 | End: 2022-04-04 | Stop reason: SDUPTHER

## 2021-10-29 RX ORDER — ROSUVASTATIN CALCIUM 20 MG/1
20 TABLET, COATED ORAL DAILY
Qty: 90 TABLET | Refills: 3 | Status: SHIPPED | OUTPATIENT
Start: 2021-10-29 | End: 2022-08-14

## 2021-10-29 RX ORDER — SUVOREXANT 10 MG/1
1 TABLET, FILM COATED ORAL NIGHTLY
Qty: 30 TABLET | Refills: 3 | Status: CANCELLED | OUTPATIENT
Start: 2021-10-29

## 2021-10-29 RX ORDER — TASIMELTEON 20 MG/1
1 CAPSULE ORAL NIGHTLY
Qty: 30 CAPSULE | Refills: 3 | Status: CANCELLED | OUTPATIENT
Start: 2021-10-29

## 2021-11-04 ENCOUNTER — PATIENT MESSAGE (OUTPATIENT)
Dept: NEUROLOGY | Facility: CLINIC | Age: 76
End: 2021-11-04
Payer: MEDICARE

## 2021-11-05 ENCOUNTER — PATIENT MESSAGE (OUTPATIENT)
Dept: NEUROLOGY | Facility: CLINIC | Age: 76
End: 2021-11-05
Payer: MEDICARE

## 2021-11-08 DIAGNOSIS — Z51.81 MEDICATION MONITORING ENCOUNTER: Primary | ICD-10-CM

## 2021-11-08 RX ORDER — LAMOTRIGINE 25 MG/1
25 TABLET ORAL DAILY
Qty: 30 TABLET | Refills: 1 | Status: CANCELLED | OUTPATIENT
Start: 2021-11-08

## 2021-11-08 RX ORDER — PRAVASTATIN SODIUM 80 MG/1
80 TABLET ORAL DAILY
Qty: 90 TABLET | Refills: 3 | Status: CANCELLED | OUTPATIENT
Start: 2021-11-08 | End: 2022-11-08

## 2021-11-12 ENCOUNTER — OUTPATIENT CASE MANAGEMENT (OUTPATIENT)
Dept: NEUROLOGY | Facility: CLINIC | Age: 76
End: 2021-11-12
Payer: MEDICARE

## 2021-11-23 ENCOUNTER — PATIENT MESSAGE (OUTPATIENT)
Dept: NEUROLOGY | Facility: CLINIC | Age: 76
End: 2021-11-23
Payer: MEDICARE

## 2021-11-24 ENCOUNTER — PATIENT MESSAGE (OUTPATIENT)
Dept: NEUROLOGY | Facility: CLINIC | Age: 76
End: 2021-11-24
Payer: MEDICARE

## 2021-11-24 DIAGNOSIS — G47.01 INSOMNIA DUE TO MEDICAL CONDITION: Primary | ICD-10-CM

## 2021-11-24 RX ORDER — RAMELTEON 8 MG/1
8 TABLET ORAL NIGHTLY
Qty: 30 TABLET | Refills: 5 | Status: SHIPPED | OUTPATIENT
Start: 2021-11-24 | End: 2021-12-07 | Stop reason: ALTCHOICE

## 2021-12-03 ENCOUNTER — PATIENT MESSAGE (OUTPATIENT)
Dept: NEUROLOGY | Facility: CLINIC | Age: 76
End: 2021-12-03
Payer: MEDICARE

## 2021-12-05 ENCOUNTER — PATIENT MESSAGE (OUTPATIENT)
Dept: NEUROLOGY | Facility: CLINIC | Age: 76
End: 2021-12-05
Payer: MEDICARE

## 2021-12-07 ENCOUNTER — OFFICE VISIT (OUTPATIENT)
Dept: NEUROLOGY | Facility: CLINIC | Age: 76
End: 2021-12-07
Payer: MEDICARE

## 2021-12-07 DIAGNOSIS — G47.00 INSOMNIA, UNSPECIFIED TYPE: Primary | ICD-10-CM

## 2021-12-07 DIAGNOSIS — R32 URINARY INCONTINENCE, UNSPECIFIED TYPE: ICD-10-CM

## 2021-12-07 PROCEDURE — 99443 PR PHYSICIAN TELEPHONE EVALUATION 21-30 MIN: ICD-10-PCS | Mod: 95,,, | Performed by: PSYCHIATRY & NEUROLOGY

## 2021-12-07 PROCEDURE — 99443 PR PHYSICIAN TELEPHONE EVALUATION 21-30 MIN: CPT | Mod: 95,,, | Performed by: PSYCHIATRY & NEUROLOGY

## 2021-12-07 RX ORDER — SUVOREXANT 10 MG/1
1 TABLET, FILM COATED ORAL NIGHTLY
Qty: 30 TABLET | Refills: 3 | Status: SHIPPED | OUTPATIENT
Start: 2021-12-07 | End: 2022-02-08

## 2021-12-10 ENCOUNTER — TELEPHONE (OUTPATIENT)
Dept: NEUROLOGY | Facility: CLINIC | Age: 76
End: 2021-12-10
Payer: MEDICARE

## 2021-12-13 ENCOUNTER — PATIENT MESSAGE (OUTPATIENT)
Dept: NEUROLOGY | Facility: CLINIC | Age: 76
End: 2021-12-13
Payer: MEDICARE

## 2021-12-13 DIAGNOSIS — N30.00 ACUTE CYSTITIS WITHOUT HEMATURIA: Primary | ICD-10-CM

## 2021-12-13 RX ORDER — SULFAMETHOXAZOLE AND TRIMETHOPRIM 800; 160 MG/1; MG/1
1 TABLET ORAL 2 TIMES DAILY
Qty: 14 TABLET | Refills: 0 | Status: SHIPPED | OUTPATIENT
Start: 2021-12-13 | End: 2021-12-20

## 2021-12-21 ENCOUNTER — OFFICE VISIT (OUTPATIENT)
Dept: NEUROLOGY | Facility: CLINIC | Age: 76
End: 2021-12-21
Payer: MEDICARE

## 2021-12-21 ENCOUNTER — PATIENT MESSAGE (OUTPATIENT)
Dept: NEUROLOGY | Facility: CLINIC | Age: 76
End: 2021-12-21

## 2021-12-21 ENCOUNTER — TELEPHONE (OUTPATIENT)
Dept: NEUROLOGY | Facility: CLINIC | Age: 76
End: 2021-12-21

## 2021-12-21 DIAGNOSIS — F31.9 BIPOLAR AFFECTIVE DISORDER, RAPID CYCLING: Primary | ICD-10-CM

## 2021-12-21 DIAGNOSIS — G25.71 AKATHISIA: ICD-10-CM

## 2021-12-21 DIAGNOSIS — Z51.81 MEDICATION MONITORING ENCOUNTER: ICD-10-CM

## 2021-12-21 DIAGNOSIS — T78.40XS SENSITIVITY TO MEDICATION, SEQUELA: ICD-10-CM

## 2021-12-21 DIAGNOSIS — F02.80 FRONTOTEMPORAL BRAIN DISEASE: ICD-10-CM

## 2021-12-21 DIAGNOSIS — F02.80 FRONTOTEMPORAL DEMENTIA: ICD-10-CM

## 2021-12-21 DIAGNOSIS — G31.09 FRONTOTEMPORAL DEMENTIA: ICD-10-CM

## 2021-12-21 DIAGNOSIS — G31.09 FRONTOTEMPORAL BRAIN DISEASE: ICD-10-CM

## 2021-12-21 PROCEDURE — 96116 NUBHVL XM PHYS/QHP 1ST HR: CPT | Mod: 95,59,, | Performed by: PSYCHIATRY & NEUROLOGY

## 2021-12-21 PROCEDURE — 99215 PR OFFICE/OUTPT VISIT, EST, LEVL V, 40-54 MIN: ICD-10-PCS | Mod: 95,,, | Performed by: PSYCHIATRY & NEUROLOGY

## 2021-12-21 PROCEDURE — 96116 PR NEUROBEHAVIORAL STATUS EXAM BY PSYCH/PHYS: ICD-10-PCS | Mod: 95,59,, | Performed by: PSYCHIATRY & NEUROLOGY

## 2021-12-21 PROCEDURE — 99215 OFFICE O/P EST HI 40 MIN: CPT | Mod: 95,,, | Performed by: PSYCHIATRY & NEUROLOGY

## 2021-12-21 RX ORDER — LAMOTRIGINE 25 MG/1
25 TABLET ORAL DAILY
Qty: 30 TABLET | Refills: 3 | Status: SHIPPED | OUTPATIENT
Start: 2021-12-21 | End: 2022-03-23 | Stop reason: SDUPTHER

## 2021-12-22 ENCOUNTER — TELEPHONE (OUTPATIENT)
Dept: NEUROLOGY | Facility: CLINIC | Age: 76
End: 2021-12-22
Payer: MEDICARE

## 2022-01-07 ENCOUNTER — TELEPHONE (OUTPATIENT)
Dept: NEUROLOGY | Facility: CLINIC | Age: 77
End: 2022-01-07
Payer: MEDICARE

## 2022-01-07 NOTE — TELEPHONE ENCOUNTER
"CG reports things are "status quo," with no new issues or concerns.  PWD is having more and more difficulty playing Bridge.  When with friends, she only superficially contributes to conversations.  She is working with Dr. Villanueva on sleep issues.  Now that PWD is having difficulty with playing Bridge, CG is interested in trying to involve PWD in other activities and asked that I send her information.  CG reports she is working on having a "back up" in place to care for PWD if sitter is sick, etc.    "

## 2022-01-12 ENCOUNTER — TELEPHONE (OUTPATIENT)
Dept: NEUROLOGY | Facility: CLINIC | Age: 77
End: 2022-01-12
Payer: MEDICARE

## 2022-01-12 NOTE — TELEPHONE ENCOUNTER
----- Message from Phill Villanueva MD sent at 1/7/2022 10:10 AM CST -----  Marin Luong,  Please schedule the patient for a video follow-up to discuss genetic test results and care management.  Phill Peter

## 2022-01-12 NOTE — TELEPHONE ENCOUNTER
LVM for patient to contact the clinic to be scheduled for a follow up visit to discuss genetic test results.

## 2022-02-07 ENCOUNTER — TELEPHONE (OUTPATIENT)
Dept: NEUROLOGY | Facility: CLINIC | Age: 77
End: 2022-02-07
Payer: MEDICARE

## 2022-02-07 NOTE — PROGRESS NOTES
"PWD's sister came to visit, and this seemed to lift PWD's spirits.  CG has been noticing more "outbursts" on the part of PWD, which she describes as "yelping" and "crying."  I asked if she believed PWD is depressed, but she is unsure.  I recommended that she and PWD's sitter monitor PWD and if her mood seems worse and she seems to be crying a lot to discuss this with PWD's neurologist.  I asked if PWD was continuing to have sleep issues, and CG said PWD appears to be refreshed in the morning and getting enough sleep.  She still has been unable to find any activities in which CG will engage.  She and her  have moved back into their home, which is still undergoing repairs from damage due to [place].  CG states that she has a contact in mind as a "back-up" for her current sitter and has set up a meeting with her.      "

## 2022-02-08 PROBLEM — E74.819 DISORDERS OF GLUCOSE TRANSPORT, UNSPECIFIED: Status: ACTIVE | Noted: 2022-02-08

## 2022-03-14 ENCOUNTER — TELEPHONE (OUTPATIENT)
Dept: NEUROLOGY | Facility: CLINIC | Age: 77
End: 2022-03-14
Payer: MEDICARE

## 2022-03-14 NOTE — PROGRESS NOTES
CG reports no new issues, concerns, or care needs.  PWD is still going to lunch with friends.  She is complaining of pain in her jaw area, and CG is going to make a dentist appt for her.

## 2022-03-15 ENCOUNTER — PATIENT MESSAGE (OUTPATIENT)
Dept: NEUROLOGY | Facility: CLINIC | Age: 77
End: 2022-03-15
Payer: MEDICARE

## 2022-03-21 ENCOUNTER — PATIENT MESSAGE (OUTPATIENT)
Dept: NEUROLOGY | Facility: CLINIC | Age: 77
End: 2022-03-21
Payer: MEDICARE

## 2022-03-22 ENCOUNTER — OFFICE VISIT (OUTPATIENT)
Dept: NEUROLOGY | Facility: CLINIC | Age: 77
End: 2022-03-22
Payer: MEDICARE

## 2022-03-22 DIAGNOSIS — G47.52 REM SLEEP BEHAVIOR DISORDER: ICD-10-CM

## 2022-03-22 DIAGNOSIS — Z88.8: ICD-10-CM

## 2022-03-22 DIAGNOSIS — I95.1 ORTHOSTATIC HYPOTENSION: ICD-10-CM

## 2022-03-22 DIAGNOSIS — F02.80 FRONTOTEMPORAL LOBAR DEGENERATION: ICD-10-CM

## 2022-03-22 DIAGNOSIS — F34.0 CYCLOTHYMIA: ICD-10-CM

## 2022-03-22 DIAGNOSIS — G31.83 LEWY BODY DEMENTIA WITH BEHAVIORAL DISTURBANCE: Primary | ICD-10-CM

## 2022-03-22 DIAGNOSIS — G31.09 FRONTOTEMPORAL LOBAR DEGENERATION: ICD-10-CM

## 2022-03-22 DIAGNOSIS — F02.818 LEWY BODY DEMENTIA WITH BEHAVIORAL DISTURBANCE: Primary | ICD-10-CM

## 2022-03-22 PROCEDURE — 96116 NUBHVL XM PHYS/QHP 1ST HR: CPT | Mod: 95,59,, | Performed by: PSYCHIATRY & NEUROLOGY

## 2022-03-22 PROCEDURE — 96116 PR NEUROBEHAVIORAL STATUS EXAM BY PSYCH/PHYS: ICD-10-PCS | Mod: 95,59,, | Performed by: PSYCHIATRY & NEUROLOGY

## 2022-03-22 PROCEDURE — 99215 OFFICE O/P EST HI 40 MIN: CPT | Mod: 25,95,, | Performed by: PSYCHIATRY & NEUROLOGY

## 2022-03-22 PROCEDURE — 99215 PR OFFICE/OUTPT VISIT, EST, LEVL V, 40-54 MIN: ICD-10-PCS | Mod: 25,95,, | Performed by: PSYCHIATRY & NEUROLOGY

## 2022-03-22 NOTE — PROGRESS NOTES
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 mild cyclothymic tendencies with mood swings bothersome to the patient and her family.   We discussed medication options in the past.   Recommended trial of low-dose Lamictal 25 mg. Side effects have been discussed with her family.   her family is advised to watch for rashes.   Will repeat LFTs and CBC at 1 and 3 months. Additional things discussed today include genetic testing.   her family would like to pursue genetic testing at this time.   We will screen for standard causes of FTLD with [employer] neurodegenerative panel.  o Primary Care Provider (02/08/2022)  - Type: Chart Review. Christine Garcia is a 76 y. O. Female with history of atrial septal aneurysm, thoracic atherosclerosis, hypercholesterolemia, depression, dementia, relative bradycardia, vasodepressive type of vasovagal.  o Specialist: cardiology (03/22/2022)  - Type: Chart Review. Christine Garcia is a 76 y. O. Female with history of atrial septal aneurysm, thoracic atherosclerosis, hypercholesterolemia, depression, dementia, relative bradycardia, vasodepressive type of vasovagal.     Current Presentation   Recent/Interim  History:  o Last time seen, the patient was started on low-dose Lamictal. Gabapentin was continued 100 mg. Belsomra does not appear to have been started. Donepezil was continued 10 mg q. Day. Since starting Lamictal her family report improvement in cycling behavior. Though there has been a recent up taking last 1-2 weeks of anxiety her daughter reports general improvement in her cyclothymic tendencies with variable highs of panic/ anxiety and confusion. Recommend increasing 25 mg once a day to twice a day following liver function tests which will be completed next 2-3 days. Other issues addressed today include longstanding insomnia. The patient is responsive to 4 mg melatonin extended release. her family does report however in recent weeks the patient has been exhibiting more REM sleep behavioral disorder. The patient has a long history of bruxism for which she is in the process of getting a mouth guard. However recently fit the patient has been noted to be talking her sleep more often. her family report minimum 1 episode in recent past where she woke up in a frantic state. her  thinks she was still sleep however was actively in distress and thought she saw things on the ground. Is not entirely Seroquel she is certain whether not these were hallucinations or REM sleep behavior disorder however her  who was present thinks she was still asleep. Review of previous medical records indicate the patient has been recently seen by Cardiology. The patient has a prior diagnosis of orthostatic hypotension. The patient is currently treated on midodrine. The patient there are no current side effects on midodrine. Otherwise the patient has improved compared to last time seen. Cycling behavior has not fully improved but however is going in the right direction. Sleep seems to be under control aside from bothersome REM sleep behavioral disorder. We discussed potential medications for controlling REM sleep behavior  disorder which include clonazepam however given prior medication sensitivities recommend increasing Lamictal 1st before considering the addition of benzodiazepines. As patient's sleep is currently under control do not recommend switching the patient from melatonin to Belsomra at this time. Ramelteon was not covered by insurance.   Unresolved Concern(s) reported by patient/family:  o CPAP noncompliance  o Anxiety/cycling mood - improved on low dose lamictal  o RBD/Bruxism - hesitant to start clonazepam however will consider if SXS get worse  o Insomnia/Advanced sleep phase disorder - ramelteon not covered, stable on melatonin, has belsomra if needed        Review of cognitive, visuospatial, motor, sensory, and behavioral systems:     Memory:    Ms. Emerson memory has worsened in the past few years.   She does have difficulty remembering recent events.   She does have difficulty remembering recent important conversations.   She does repeat statements or asks the same question repeatedly.   She does forget information within minutes.   She does misplace personal items (e.g., keys, cell phone, wallet) more frequently.   She does have difficulty keeping track of her medications.   She does forget people's names more frequently.   Her remote memory is intact.  Attention:    She does become easily distracted.   Her attention and concentration are impaired.   She does not have attentional fluctuations.   She does have difficulty with selective attention.   She does have difficulty with divided attention.   She does have difficulty with executive attention.  Executive:    Ms. Melaras cognitive processing speed is slower.   She does not have word-finding difficulties.   She does have difficulty with working memory.   She does not have difficulty with planning/organizing/completing multistep tasks.   She does not have difficulty with flexible thinking.   She does not have difficulty with response  inhibition.   Her judgment is impaired.  Language:    Her speech output is not unaffected.   Ms. Garcia's speech is fluent and non-effortful.   Ms. Melaras speech is grammatically intact.   She does not make word substitutions.   She does not have difficulty reading.   She does not appear to have impaired comprehension.  Visuospatial:    She does not have difficulty recognizing objects or faces.   Ms. Garcia does have visuospatial disorientation.   She does not get lost in familiar places.   She does have trouble navigating.   She has become confused or disoriented in *new*, unfamiliar places.  Motor/Coordination:    Ms. Garcia does not have difficulty with walking.   She does not feel imbalanced.   She denies having fallen.   She does not appear to have new muscle weakness.   She does have difficulty buttoning shirts, operating zippers, or manipulating tools/utensils.   Her handwriting has not become micrographic.   She does not have a resting tremor.   She denies having any new involuntary movements and/or muscle jerking.   She does not have swallowing difficulty.   She denies new muscle cramps and twitching.  Sensory:    Ms. Garcia denies new numbness, tingling, paresthesias, or pain.   Ms. Garcia denies a loss of vision, blurry vision, or double vision.   Ms. Garcia denies new loss of hearing or worsening tinnitus.  Sleep:    Ms. Garcia reports difficulty sleeping. Comment: 4/2020: She sleeps well. 4/2021: She uses CPAP to treat obstructive sleep apnea (since fall 2020), but this is inconsistent. Her  said she has conversations in her sleep or will seem to be awake but still be sleeping. No significant motor movements in sleep.   Ms. Garcia does have difficulty going to sleep.   Ms. Garcia reports difficulty staying asleep and/or frequently awakening at night.   Ms. Garcia does snore and/or have witnessed apneas while sleeping. Comment: She uses CPAP to treat  obstructive sleep apnea (since fall 2020), but this is inconsistent.   When she wakes up in the morning, she does feel well-rested.   She has been reported to have dream-enactment behavior. Comment: Her  said she has conversations in her sleep or will seem to be awake but still be sleeping. No significant motor movements in sleep.   She denies symptoms suggestive of restless leg syndrome.  Behavior:    Ms. Garcia's personality has changed.   She does have symptoms of disinhibition and social inappropriateness.   She is exhibiting symptoms to suggest a loss of manners and/or decorum.   She is exhibiting new symptoms that suggest they have become more impulsive, rash and/or careless.   She does not have apathy and/or decreased motivation. Comment: 4/2020: Her daughter noted reduced engagement in pleasurable activities. 4/2021: Significant, per daughter. She mostly sits in her chair and stares. Ms. Garcia said she does needlepoint, but her daughter said this is not consistent. She was prescribed Ritalin.   She does appear to have had a change in behavioral/emotional inertia.   Ms. Garcia's emotional expression has changed.   She does have emotional blunting or lability.   She has been reported to have new symptoms of agitation, aggression, or violent outbursts. Comment: 4/2020: Resistance to taking medication but will accept feedback and support from family without defensiveness. 4/2021: None reported.   She does have symptoms of irritability and mood lability. Comment: 4/2020: Her daughter noted some irritability due to stress. 4/2021: Irritability with her  in the context of caregiving.   Her insight into his disease and situation is impaired.   Her personal hygiene is intact.   She is exhibiting a diminished response to other people's needs and feelings? Comment: patient has consistently demonstrated decrease in appetite and response to both her  and others. Patient has been  hardly a necessarily mean to her  who has recently had a stroke and has become physically disabled following this.   She is exhibiting diminished social interest, interrelatedness, and/or personal warmth.   She does appear restless.   She denies new and/or worsening simple repetitive behaviors.   Her speech has not become simplified or become repetitive/stereotyped.   She reports symptoms that are suggestive of new/worsening complex repetitive/ritualistic compulsions and behaviors.   She does not have symptoms of hyper-religiosity or dogmatism.   Her interests/pleasures have not become restrictive, simplified, interrupting, or repetitive.   She is exhibiting symptoms to suggest an increase in self-stimulating behavior. Comment: 4/2021: Picking behaviors, which progressed beyond her face to her back and chest despite changes in medications. Triple-checking door locks at night.   She has had changes in eating behavior. Comment: Patient is engaged in freeing frequent binge eating often getting 10-20 lb a time losing it however patient's of food patterns have not otherwise become for strict it. Per family she has always had a sweet tooth however the cycling and range of this behavior has increased   She has been exhibited symptoms to suggest increased consumption of food and/or alcohol/cigarettes.   She denies oral exploration or consumption of inedible objects.  Psychiatric:    She does feel depressed. Comment: 4/2020: Ms. Garcia said she feels sad at times. Her daughter said she has noticed depressed mood and lack of self care. Her daughter indicated friends and medical providers have noticed changes. 4/2021: Improvement in depressed mood with medication but tearful at times without a clear cause. Her daughter said she is more timid and unsure.   She is exhibiting symptoms of social withdrawal/indifference.   She does have anxiety. Comment: 4/2020: None reported. 4/2021: Significant anxiety, at  times (e.g., riding in car, around the house, going to an art class), but no anxiety at other times.   She does exhibit cycling behavior.   She does exhibit hyperactive behavior.   She is not exhibited symptoms of paranoia.   She does have delusions.   She does have hallucinations. Comment: 2020: None reported 2021: After her ankle fracture, she saw snakes on the floor of the bedroom at night and during the day. She was not taking pain medication at the time. Around , she saw and communicated with two little children in her courtyard. One evening in December, she saw and talked with their  dog, who she saw in a tree outside the window.   She does have a history of sensitivity to neuroleptic/psychotropic medications.  Medical Review of Systems:    Ms. Garcia does not have constipation.   Ms. Garcia does have urinary incontinence. Comment: urinary incontinence/urge as of 2021   Ms. Garcia does have anosmia.   Ms. Garcia reports symptoms that are suggestive of orthostatic lightheadedness.   Ms. Garcia's weight is unstable. Comment: 2020: Her daughter reported changes in diet. She eats less when depressed but eats sweets and coke. Her daughter is doing the grocery shopping. 2021: Her daughter has noticed some weight loss  Functional status:   Difficulty performing the following Instrumental ADLs:  o Household chores: Yes  o Food Preparation: Yes  o Shopping: Yes  o Ability to Handle Finances: Yes  o Transportation/Driving: Yes  o Household Appliances/Stove: Yes  o Laundry: Yes   Difficulty performing the following Basic ADLs:  o Dressing: No  o Bathing: No  o Toileting: No  o Personal hygiene and grooming: No  o Feeding: No  Care Management:   Patient/Family Safety Concerns:  o Medication Adherence: Yes  o Home Safety: No  o Wandered: No  o Firearms: No  o Fall Risk: No  o Home Alone: No       Past Medical History:   Diagnosis Date    Dyslipidemia 10/2/2015    Fall  2020    with fracture    Hyperlipidemia     Hypertension     minimal    Migraines     Obesity 10/2/2015    Patent foramen ovale 10/2/2015       Past Surgical History:   Procedure Laterality Date    COLONOSCOPY N/A 2017    Procedure: COLONOSCOPY;  Surgeon: Gerber Naylor MD;  Location: [facility];  Service: Endoscopy;  Laterality: N/A;    HYSTERECTOMY      KNEE ARTHROPLASTY Bilateral 2020    meniscus repair    left elbow      left knee      OOPHORECTOMY      OPEN REDUCTION AND INTERNAL FIXATION (ORIF) OF INJURY OF ANKLE Right 11/3/2020    Procedure: ORIF, ANKLE;  Surgeon: Ted Rome MD;  Location: [facility] OR;  Service: Orthopedics;  Laterality: Right;       Family History   Problem Relation Age of Onset    Diabetes Father     Heart disease Father     COPD Father     Lung cancer Brother     Cancer Sister        Social History     Socioeconomic History    Marital status:      Spouse name: josé   Tobacco Use    Smoking status: Former Smoker     Packs/day: 1.00     Years: 20.00     Pack years: 20.00     Types: Cigarettes     Quit date:      Years since quittin.2    Smokeless tobacco: Never Used    Tobacco comment: 1-2ppd   Substance and Sexual Activity    Alcohol use: Yes     Alcohol/week: 1.0 standard drink     Types: 1 Shots of liquor per week     Comment: occ    Drug use: Never    Sexual activity: Yes     Partners: Male     Birth control/protection: Post-menopausal, None     Social Determinants of Health     Financial Resource Strain: Low Risk     Difficulty of Paying Living Expenses: Not hard at all   Food Insecurity: No Food Insecurity    Worried About Running Out of Food in the Last Year: Never true    Ran Out of Food in the Last Year: Never true   Transportation Needs: No Transportation Needs    Lack of Transportation (Medical): No    Lack of Transportation (Non-Medical): No   Physical Activity: Inactive    Days of Exercise per Week: 0 days     Minutes of Exercise per Session: 0 min   Stress: Stress Concern Present    Feeling of Stress : Very much   Social Connections: Unknown    Frequency of Communication with Friends and Family: Never    Frequency of Social Gatherings with Friends and Family: Once a week    Active Member of Clubs or Organizations: No    Attends Club or Organization Meetings: Never    Marital Status:    Housing Stability: Low Risk     Unable to Pay for Housing in the Last Year: No    Number of Places Lived in the Last Year: 1    Unstable Housing in the Last Year: No       Medication:     Current Outpatient Medications on File Prior to Visit   Medication Sig Dispense Refill    donepeziL (ARICEPT) 10 MG tablet Take 1 tablet (10 mg total) by mouth every evening. 30 tablet 11    ergocalciferol (ERGOCALCIFEROL) 50,000 unit Cap Take 50,000 Units by mouth every Sunday.       gabapentin (NEURONTIN) 100 MG capsule Take 1 capsule (100 mg total) by mouth every evening. 30 capsule 3    lamoTRIgine (LAMICTAL) 25 MG tablet Take 1 tablet (25 mg total) by mouth once daily. 30 tablet 3    midodrine (PROAMATINE) 5 MG Tab TAKE 1 TABLET (5 MG TOTAL) BY MOUTH 3 (THREE) TIMES DAILY WITH MEALS. 270 tablet 1    rosuvastatin (CRESTOR) 20 MG tablet Take 1 tablet (20 mg total) by mouth once daily. 90 tablet 3    simethicone (GAS-X MAXIMUM STRENGTH ORAL) 2 (two) times a day.       UNABLE TO FIND       UNABLE TO FIND       UNABLE TO FIND       UNABLE TO FIND       UNABLE TO FIND        Current Facility-Administered Medications on File Prior to Visit   Medication Dose Route Frequency Provider Last Rate Last Admin    omnipaque 350 iohexol 80 mL  80 mL Intravenous ONCE PRN BRENDEN Bailey Jr., MD            Review of patient's allergies indicates:   Allergen Reactions    Sucrose Other (See Comments)     GI issues -       Medications Reconciliation:   I have reconciled the patient's home medications and discharge medications with the  patient/family. I have updated all changes.  Refer to After-Visit Medication List.    Objective:  Vital Signs:  There were no vitals filed for this visit.  Wt Readings from Last 3 Encounters:   03/21/22 1326 84.4 kg (186 lb)   02/08/22 1312 86.2 kg (190 lb)   10/28/21 1241 87.8 kg (193 lb 7.3 oz)     There is no height or weight on file to calculate BMI.     Neurological examination:  Mental Status:    Ms. Garcia is awake; Her energy level appeared normal.    Comment: significant improvement in energy level compared to last time seen no longer appears hypomanic   Her appearance is normal (hygiene is appropriate; attire is proper and clean).    Comment: patient is clean and more attentive to details significant improvement compared to last time seen   She has appropriate attention/concentration (NADEEM/WARNER forwards and backward).   She can complete three-step commands.   Her thought process is logical and goal-oriented.   She has evidence of hallucinations.   She has evidence of delusions.   She demonstrated impaired judgment based on actions and plans for the future.    Comment: Impaired Judgement   She demonstrated impaired insight based on actions, awareness of her illness, plans for the future.    Comment: Impaired insight   Throughout the interview, she is cooperative, her eye contact is appropriate.    Comment: significant improvement in exaggerated over familiar behavior does still appear to impaired social barriers however this is significant improvement compared to last time seen  Cranial Nerves:    Her pupils were normal.   Her elemental visual acuity assessment demonstrated normal OS vision and normal OD vision.   Her visual fields were full to confrontation in all quadrants.   Assessment of extinction with double/simultaneous stimuli was negative in her bilateral visual fields.   Her ocular pursuit in the horizontal and vertical plane was complete.   Her saccadic initiation, velocity,  "and amplitude are normal.   Her eyelid assessment showed no apraxia. There was no eyelid dysfunction, retraction, or somers sign.   Her facial strength was normal.   Her facial expression was symmetric and appropriate to the context.   She does not require the use of hearing aids.   Her tongue showed no evidence of scalloping.   She can protrude their tongue beyond Her lips for >10 sec.   She can move their extended tongue back and forth rapidly.   She had no significant evidence of anterocollis or retrocollis.  Speech/Language:    Ms. Emerson speech was fluent, non-effortful, and her rate was appropriate to the context.   Her speech timbre is abnormal.    Comment: loud   Her speech rate is abnormal.    Comment: fast/pressured   Her respirations are within normal range and appropriate to context.   Her speech timbre is normal.   She has no articulation (segmental features) errors.   She has no speech dysdiadochokinesia with repetition of syllables such as "/PA/, /TA/, /KA/, /OM/".   She made no errors during the repetition of rapid syllables and or words such as "caterpillar" "", and "huckleberry"   She has no repetition errors of rapid sequences of consonants, such as in "[employer]" or "[employer]".   She has no prosody (suprasegmental features) errors.   Her stress assessment showed no repetition errors in linguistically complex words, including multisyllabic words ("planetarium," "questionable," "accomplishment," "phonetic.   Ms. Emerson speech is not dysarthric.   Ms. Emerson speech was without evidence of anomia.   She showed no evidence of anomia during spontaneous speech.   She showed no evidence of anomia during confrontational naming; 12/12 (correct chocolate bar, kangaroo, theater, Anabaptism, doctor, potato, battery, ice cube tray, thermometer, flower, bomb, and calendar).   She makes no phonological loop errors.   She makes no errors during " "the repetition of gibberish words (e.g., "Supercalifragilisticexpialidocious," "Pigglywiggly," "Woospiedoo," "Zowzy," "Bazinga").   She makes no errors during the repetition of complex meaningless phrases (e.g., "The horse raced past the barn fell.", "The complex houses  and single soldiers and their families," "Wishes are hopping, and trees are west," and "Brushing liked to elzaangie cheikh's direction").   She can comprehend commands that cross the midline (e.g., with your left thumb, touch your right ear).   She can comprehend commands that depend on syntax (e.g., point to the ceiling after you point to the floor).   Her speech is grammatically intact; (no function/semantic word substitutions, phonemic/semantic paraphasias, or binary confusion).  Motor:    Ms. Garcia's bilateral upper extremity muscle bulk is appropriate.   Ms. Garcia's bilateral upper extremity muscle tone is not increased.   There was no dystonia observed on examination.   Assessment of motor strength was symmetric and at minimal anti-gravity.   There is no pronator or downward drift.   There is no upward drift.   There is no outward/diagonal drift.   Deltoid L +5/5 R +5/5 Biceps L +5/5 R +5/5 Triceps L +5/5 R +5/5 Wrist extension L +5/5 R +5/5 Finger abduction L +5/5 R +5/5 Hip flexion L +5/5 R +5/5 Hip extension L +5/5 R +5/5 Knee flexion L +5/5 R +5/5 Knee extension L +5/5 R +5/5 Ankle flexion L +5/5 R +5/5 Ankle extension L +5/5 R +5/5   There is no myoclonus observed in Ms. Garcia's bilateral upper and lower extremities.   There are no fasciculations observed in Ms. Garcia's bilateral upper and lower extremities.  Coordination:    She has no bilateral upper extremity limb dysmetria or past pointing on finger-nose-finger bilaterally.   She has no bilateral lower extremity limb dysmetria during shin rub.   She has no limb dysdiadochokinesia of the upper extremity on the pronation/supination test and screwing in a light " bulb or lower extremity during tapping ball of each foot bilaterally.   She has no cerebellar rebound bilaterally.   She has no visible tremor.   She has no kinetic tremor bilaterally.   She has no postural tremor bilaterally.   She has no resting tremor bilaterally.   She has evidence of interhemispheric motor control deficits.   She demonstrates evidence of motor overflow.   She demonstrates alien limb phenomena.   She has dyskinetic movements.    Comment: on presentation patient has mild oral facial dyskinesias improved compared to baseline 3 months prior   She has no athetosis.   She has no choreiform movements.   She has no ballistic movements.   She has evidence of akathisia.    Comment: mild significant improvement compared to last time seen   She has evidence of tardive dyskinesia.    Comment: mild significant improvement compared to last time seen   Ms. Melaras bilateral upper extremity coordination with finger tapping, pronation/supination, and the open-close fist showed no slowing, no hypometria, and no dysrhythmia inconsistent with bradykinesia.   Ms. Garcia's bilateral upper extremity coordination with finger tapping, pronation/supination, and the open-close fist showed slowing.   Ms. Garcia's bilateral upper extremity coordination with finger tapping, pronation/supination, and the open-close fist showed hypometria.   Ms. Melaras bilateral upper extremity coordination with finger tapping, pronation/supination, and the open-close fist showed dysrhythmia.  Higher Cortical Function:    Ms. Garcia showed no evidence of simultanagnosia (Navon hierarchical letters).   She demonstrates no evidence of dorsal simultanagnosia (overlapping objects).   She demonstrates no evidence of ventral simultanagnosia (complex picture synthesis).   Ms. Garcia showed no evidence of visuospatial constructional dysfunction.   She has a normal figure copy (intersecting pentagons).   She has a  normal 3D cube copy.   Her CLOX Draw is normal.   Ms. Garcia showed no evidence of agnosia.   She has intact recognition and naming of famous faces 5/5.   Ms. Garcia showed evidence of apraxia.   She showed no evidence of ideomotor apraxia performing tool-use pantomimes (e.g., use a hammer, use a screwdriver, use a comb, flip a coin, waving goodbye).   She showed no evidence of ideational apraxia (e.g., taking off and putting on shoes, folding paper into an envelope).   She showed evidence of limb-motor apraxia during mimicking complex bimanual hand shapes.   She showed no evidence of buccofacial apraxia (e.g., blow out a candle, puff out cheeks, and whistle).   She showed dysexecutive behavior.   She showed no utilization or imitation behavior.   She has evidence of perseverative or stereotyped behavior.   She has no stimulus-bound behavior.  Reflexes:    Reflexes were symmetric and 2+ at biceps, 2+ triceps, and 2+ brachioradialis, 2+ at the knees bilaterally, there was no cross-abductor sign, 2+ in the bilateral ankles.  Gait:    She has normal posture sitting unaided.   She can arise from a chair and sit back down without using their arms.   Her gait was normal.   Her posture while walking is normal.   Her gait initiation/inhibition was normal.   Her stance while walking is normal.   Her gait speed (6 meters) was normal (70-80 F 1.13 m/s M 1.26 m/s, >80 F 0.94 m/sec, M 0.97 m/sec).   Her stride including step-time, step-width, and step-length was normal.   Her arms swing is symmetric and of normal amplitude.   She takes turns in <4 steps.   She has no truncal ataxia.   When attempting to walk abnormally (heels, tiptoes, tandem), she makes no errors.  Neuropsychological Evaluation Summary:     Prior Neurocognitive/Neuropsychological Evaluations   Summary from EMR:  o Informal Neuropsych testing in December 2020  o MoCA = 24/30  o Informal Neuropsych testing in 3/17/21  o Mini Mental  Status Exam [MMSE] = 26/30  o MoCA = 24/30  o Informal Neuropsych testing in 4/2/21  o McCarr Cognitive Assessment [MoCA] = 20/22).  o She had difficulty with auditory attention and delayed recall (2/5 words, 3/3 words with cueing).  o Formal Neuropsych testing in 4/12/21  o Ms. Garcia completed a neuropsychological evaluation in June 2020. Tests administered included: MoCA; MSVT; Test of Premorbid Functioning; Wechsler Adult Intelligence Scale, Fourth Edition (WAIS-IV) selected subtests; Wechsler Memory Scale, Fourth Edition (WMS-IV), selected subtests; Neuropsychological Assessment Battery (NAB), selected subtests; Verbal fluency tests (FAS & animal naming; Lu et al., 2004 norms); Ellsworth Verbal Learning Test, Revised (HVLT-R; Form 1); Enterprise Making Test, parts A and B (Lu et al., 2004 norms); [place] Card Sorting Test-128 (WCST- 128; computer version), Oscar Complex Figure Test, Geriatric Depression Scale (GDS-30); Lees Anxiety Inventory (TABATHA).  o Ms. Garcia's performance on neuropsychological testing suggested deficits in areas including executive functioning (with impact on aspects of visuospatial functioning), learning and retention of information, and attention and working memory. Language, processing speed, and aspects of visuospatial functioning were largely consistent with premorbid estimates, taking into  deficits. Comparisons to prior testing suggested declines across most areas that were compared. Testing did not strongly indicate a pattern that would suggest Alzheimer's dementia, as recognition memory and language were intact. Taken together, reports of cognitive, neuropsychiatric, physical, and functional change suggest that a diagnosis of dementia with Lewy bodies should be considered in the differential. Although her symptom picture and test patterns are not entirely consistent, the constellation of symptoms is most suggestive of this etiology at a mild to moderate level,  with behavioral disturbance. Visuospatial functioning was also largely within normal limits, with relative reduction on a complex figure drawing task    Neurocognitive Evaluation completed on 03/22/2022:  Neuropsychiatric/Behavioral Focused Evaluation Assessment   BEHAV5+ 5/6 See ROS section for a full description   Laboratories:     Lab Date Value [Reference]   Autoimmune/Paraneoplastic Screening           GIANNI Screen 2021, Sep-20  2021, Sep-20  2021, Sep-20    None Detected [None Detected]  None Detected [None Detected]  None Detected [None Detected]      Sed Rate 2021, Sep-20  2021, Sep-20  2021, Sep-20    7 [0 - 29 mm/Hr]  7 [0 - 29 mm/Hr]  7 [0 - 29 mm/Hr]      Metabolic Screening   Hemoglobin A1C External 09/20/2021  5.2 [4.0 - 5.6 %]  5.2 [4.0 - 5.6 %]  5.2 [4.0 - 5.6 %]      Homocysteine 06/13/2020  5.0 [4.0 - 15.5 umol/L]  5.0 [4.0 - 15.5 umol/L]  5.0 [4.0 - 15.5 umol/L]      TSH 04/18/20202020, Apr-18    1.500 [0.40 - 4.00 uIU/mL]  1.760 [0.40 - 4.00 uIU/mL]      Glucose 2021, Apr-28    105 [70 - 110 mg/dL]      Albumin 2021, Apr-28 2021, Apr-28 2021, Jan-27    4.2 [3.5 - 5.2 g/dL]  4.2 [3.5 - 5.2 g/dL]  4.6 [3.5 - 5.2 g/dL]      Alkaline Phosphatase 2021, Apr-28 2021, Apr-28 2021, Jan-27    108 [38 - 145 U/L]  108 [38 - 145 U/L]  106 [38 - 145 U/L]      ALT 2021, Apr-28 2021, Apr-28 2021, Jan-27    25 [0 - 35 U/L]  25 [0 - 35 U/L]  26 [0 - 35 U/L]      AST 2021, Apr-28 2021, Apr-28 2021, Jan-27    32 [14 - 36 U/L]  32 [14 - 36 U/L]  37 (H) [14 - 36 U/L]      BILIRUBIN TOTAL 2021, Apr-28 2021, Apr-28 2021, Jan-27    0.9 [0.2 - 1.3 mg/dL]  0.9 [0.2 - 1.3 mg/dL]  0.9 [0.2 - 1.3 mg/dL]      PROTEIN TOTAL 2021, Apr-28 2021, Apr-28 2021, Jan-27    7.0 [6.0 - 8.4 g/dL]  7.0 [6.0 - 8.4 g/dL]  7.7 [6.0 - 8.4 g/dL]      Cholesterol 2021, Oct-28    177 [120 - 199 mg/dL]      HDL 2021, Oct-28    51 [40 - 75 mg/dL]      Non-HDL Cholesterol 2021, Oct-28    126 [mg/dL]      Triglycerides 04/18/2020  281 (H)  [30 - 150 mg/dL]      Folate 04/18/2020  >20.0 [4.0 - 24.0 ng/mL]      Thiamine 2020, Apr-18    142 [70 - 180 nmol/L]      Vit D, 25-Hydroxy 04/18/2020  84 [30 - 96 ng/mL]  84 [30 - 96 ng/mL]  84 [30 - 96 ng/mL]      Vitamin B-12 2020, Apr-18    798 [210 - 950 pg/mL]      Vitamin B2 2020, Apr-18    15 [5 - 50 nmol/L]      Vitamin B6 2020, Jun-13 2020, Apr-18    397.3 (H) [20.0 - 125.0 nmol/L]  300.6 (H) [20.0 - 125.0 nmol/L]      Infectious Disease/Immunocompromised Screening   SARS-CoV-2 RNA, Amplification, Qual 2020, Nov-02    Negative      Standard Hematology Screen   Hematocrit 2021, Jan-27    44.4 [37.0 - 48.5 %]      Hemoglobin 2021, Jan-27    14.9 [12.0 - 16.0 g/dL]      MCV 2021, Jan-27    88 [82.0 - 98.0 fL]      Platelets 2021, Jan-27    261 [150 - 350 K/uL]      Neuroendocrine/Electrolyte Screening   BUN 2021, Apr-28    20 (H) [7 - 18 mg/dL]      Chloride 2021, Apr-28    106 [95 - 110 mmol/L]      Creatinine 2021, Apr-28    0.84 [0.5 - 1.4 mg/dL]      Potassium 2021, Apr-28    4.6 [3.5 - 5.1 mmol/L]      Sodium 2021, Apr-28    142 [136 - 145 mmol/L]      Delirium Screening   Bacteria, UA 2021, Dec-10    Few (A) [Negative /hpf]      Glucose, UA 2021, Dec-10    Negative      Ketones, UA 2021, Dec-10    Negative      Leukocytes, UA 2021, Dec-10    Negative      NITRITE UA 2021, Dec-10    Negative      Protein, UA 2021, Dec-10    Negative      RBC, UA 2021, Dec-10    0 [0 - 4 /hpf]      WBC, UA 2021, Dec-10    1 [0 - 5 /hpf]           Neuroimaging:    Brain Fluorodeoxyglucose-positron emission tomography on 8/20/21   Technique: Following injection of 11.1 mCi of F18 FDG, tomographic emission images were acquired of the brain with a dedicated full ring PET/CT fusion scanner. Non-contrast enhanced CT images were obtained as a transmission scan and for anatomic localization. Blood glucose level: 94 mg/dl. Uptake time: 1 hour 3 minutes. As per PERCIST criteria, SUVlbw is reported. An individualized dose  optimization technique, automated exposure control, was utilized for the performed procedure.   Formal interpretation by Radiology:   Hypometabolism predominantly within the frontal lobes, and to a lesser degree within the left temporal and parietal lobes. 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   Independently reviewed radiological imaging by Phill Pablo MD. MPH. Behavioral Neurologist   Impression: : Though there is visual evidence of decreased metabolism in the left frontal region, there is largely stable asymmetry of the high dorsal left prefrontal region since 2015 which will accordingly skew the relative illuminance of the region    Brain Dopamine transporter SPECT imaging on 8/27/2021   Technique: Following administration of thyroid blocking agent, patient received intravenous dose of 6.95 mCi of I123 Ioflupane. After 3 hour delay, SPECT imaging of the brain is performed.   Formal interpretation by Radiology:   Activity is absent within the left putamen and caudate nucleus. There is also diminished activity in the right putamen. IMPRESSION: ABNORMAL EXAMINATION WITH EVIDENCE FOR STRIATAL DOPAMINERGIC NEURODEGENERATION.   Independently reviewed radiological imaging by Phill Pablo MD. MPH. Behavioral Neurologist   Impression: : there is clear evidence of asymmetric left greater than right decreased dopaminergic reuptake strongly suggesting at minimum degeneration of the dopaminergic network    MRI brain/head with and without contrast on 5/22/2018   Technique: Multiplanar, multisequence pre and post-contrast MRI of the brain and IAC's was performed.   Comparison: None   Formal interpretation by Radiology:   Age-appropriate generalized involutional changes are seen along with findings most compatible with mild chronic microvascular ischemic changes.   Independently reviewed radiological imaging by Phill Palbo MD. MPH. Behavioral Neurologist   T1: No significant cortical  atrophy pattern further testing to be a mild asymmetry in the left greater than right dorsal lateral prefrontal cortex   T2/FLAIR: scattered punctuated subcortical hyperintensity lesions consistent with microvascular ischemic changes were right greater than left   DWI/ADC: No Significant DWI hyperintensities/hypointensities. No ADC correlation.   SWI/GRE: No Significant hypointensities to suggest cortical/subcortical hemosiderin deposition.   Impression: : largely normal MRI brain with scattered microvascular changes most predominantly in the right basal ganglia    MRI brain/head without contrast on 1/27/21   Technique: Multiplanar multisequence MR imaging of the brain was performed without the administration of intravenous contrast.   Comparison: CT head 01/27/2021, MRI brain 05/29/2018   Formal interpretation by Radiology:   Age-appropriate generalized cerebral volume loss with findings suggestive of mild chronic microvascular ischemic change.   Independently reviewed radiological imaging by Phill Pablo MD. MPH. Behavioral Neurologist   T1: Generalized cortical atrophy; mild left greater than right frontal greater than posterior. small cavum septum pellucidum. very mild right greater than left hippocampal atrophy. collectively atrophy pattern seems to be slightly increased compared to 2018   T2/FLAIR: mild bilateral scattered punctuated lesions consistent with hypertensive microvascular ischemic changes. Mild periventricular capping right greater than left. Two areas of focal cavitation in the corona radiata adjacent to the right basal ganglia.   DWI/ADC: No Significant DWI hyperintensities/hypointensities. No ADC correlation.   SWI/GRE: No Significant hypointensities to suggest cortical/subcortical hemosiderin deposition.   Impression: : mild generalized cortical atrophy slightly pronounced in the dorsal lateral prefrontal cortex with some scattered right greater than left subcortical  microvascular changes consistent with hypertensive strokes. microvascular changes seem to be largely stable compared to 2018 however atrophy pattern seems to be slightly pronounced.     Procedures:    Electroencephalogram on 1/27/2021   Formal interpretation:   This is a normal EEG in an awake and drowsy adult. No potentially epileptiform activity was seen. Please be aware that a normal EEG does not exclude the possibility of an underlying seizure disorder.   Independently reviewed Electroencephalogram by Phill Pablo MD. MPH. Behavioral Neurologist   Impression: : normal    Electrocardiogram on 1/27/21   Formal interpretation:   Vent. Rate : 048 BPM     Atrial Rate : 048 BPM    P-R Int : 182 ms          QRS Dur : 080 ms     QT Int : 450 ms       P-R-T Axes : 049 -21 024 degrees    QTc Int : 402 ms Sinus bradycardia Anterolateral infarct ,age undetermined Abnormal ECG   Independently reviewed Electrocardiogram by Phill Pablo MD. MPH. Behavioral Neurologist   Impression: : Received ECG has mild evidence of sinus node disease. HR (>=50-60) 48 BPM. Prolonged IA interval (>0.22 s). Broad QRS complex (> 0.12 s).    Echocardiography on 2/5/21   Formal interpretation:   Concentric hypertrophy and normal systolic function. The estimated ejection fraction is 60% Grade I left ventricular diastolic dysfunction. Normal right ventricular size with normal right ventricular systolic function. Mild tricuspid regurgitation. Normal central venous pressure (3 mmHg). The estimated PA systolic pressure is 29 mmHg.   Independently reviewed Echocardiography by Phill Pablo MD. MPH. Behavioral Neurologist   Impression: : Normal     Clinical Summary:  Ms. Garcia is a 75-year-old right-handed female with a relevant past medical history of Metabolic syndrome ( hypertension, hyperlipidemia, obesity), migraine headaches, MDD, ERENDIRA on CPAP, who presents reporting a 7-year history of gradually progressive  neurocognitive impairment. The clinical history is suggestive of:Memory Impairment: LTM encoding-retrieval impairment, Procedural Memory, STM encoding impairmentAttention Impairment: Sustained attention, Selective attention, Divided attention, Executive attentionExecutive Impairment: Energization, Set-Shifting, Response Inhibition, Working MemoryVisuospatial Impairment: Allocentric Spatial ProcessingMotor/Coordination Impairment: Sensory motor integrationBehavior Impairment: Disinhibition, Neurovegetative, Emotional Regulation, Self-Preservation Dysregulation, Social Coherence, Sensorimotor Dysregulation, Stimulation DysregulationPsychiatric Impairment: Signal-Noise Dysregulation, Fixed-False Beliefs, Hallucinations, Neuroleptic SensitivityMedical Review of Systems Impairment: Limbic Dysfunction, Autonomic DysfunctioniADL Impairment: Franklin Instrumental Activities of Daily Living Scale The neurological examination is significant for:Cortical Parietal Dysfunction: visuospatial constructional dysfunction (intersecting pentagons, 3D figure draw, clock draw)Executive Impairment: serial processing, thought disorder, judgment, dysexecutive behavior (utilization and/or imitation, perseverative/stereotyped)Movement Disorder (Speech): abnormal vocal features (volume, rate) Informal neuropsychology battery is positive (based on age and education) for:Formal testing on 04/2021 - Ms. Garcia's performance on neuropsychological testing suggested deficits in areas including executive functioning (with impact on aspects of visuospatial functioning), learning and retention of information, and attention and working memory. Language, processing speed, and aspects of visuospatial functioning were largely consistent with premorbid estimates, taking into  deficits. Neurological imagingBrain Fluorodeoxyglucose-positron emission tomography (8/20/21): Though there is visual evidence of decreased metabolism in the left  frontal region, there is largely stable asymmetry of the high dorsal left prefrontal region since 2015 which will accordingly skew the relative illuminance of the region Brain Dopamine transporter SPECT imaging (8/27/2021): there is clear evidence of asymmetric left greater than right decreased dopaminergic reuptake strongly suggesting at minimum degeneration of the dopaminergic networkMRI brain/head with and without contrast (5/22/2018): largely normal MRI brain with scattered microvascular changes most predominantly in the right basal gangliaMRI brain/head without contrast (1/27/21): mild generalized cortical atrophy slightly pronounced in the dorsal lateral prefrontal cortex with some scattered right greater than left subcortical microvascular changes consistent with hypertensive strokes. Microvascular changes seem to be largely stable compared to 2018 however atrophy pattern seems to be slightly pronounced.        Assessment:        Ms. Garcia's clinical presentation is behavior/psychiatric predominant major cognitive impairment (mild dementia) sufficient to impair activities of daily living (CDR-SOB: 4.5 , Cesar-Sky iADL: 8/8 - Mild Dementia).     Ms. Melaras clinical syndrome is unclear and has features of multiple syndromes. Ms. 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 Brain 2011) and has features of possible Dementia with Lewy Bodies (DLB) (McKeith et al. Neurology 2005). Frontotemporal dementia (FTD) and dementia with Lewy bodies (DLB) are distinct neurodegenerative syndromes. It is well known that patients with pathologically confirmed FTD can later develop Parkinsonism and patients with DLB typically have executive dysfunction often in association with visuospatial dysfunction. Less commonly, some patients may have mixed features of FTD and DLB. At this time, patient's polypharmacy has been largely controlled. The  patient appears to be responsive to low-dose Lamictal/gabapentin and sleep management. As patient's syndrome continues to evolve symptomatology is more consistent with Lewy body disease than frontal temporal lobar degeneration.     The pathology underlying Ms. Garcia's cognitive impairment is unclear. Possibilities include a mixed Lewy body disease/alpha-synucleinopathy and and dorsal prefrontal ADRP. Frontotemporal Dementia with Parkinsonism-17/Three-Repeat Tauopathy remains a possibility however the lack of parkinsonism on examination.     At the observations and made above were discussed with the patient and her family. 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 The patient's clinical syndrome was initially masked by polypharmacy as we recommend monitoring for symptoms mentioned above. Now that we have tapered off previous medications, her clinical presentation is becoming more clear. Patient's presentation is becoming to look more akin to Lewy body dementia   Given neuroleptic sensitivity, REM sleep behavioral disorder, well formed hallucinations/delusions, personal familial history of mood disorder, significant fluctuations in arousal/mood  however at this time the patient does not have overt parkinsonism. Recommend increasing Lamictal from 25 mg to 25 mg b. I. D. Following LFTs. Continue melatonin 4 mg. If needed will attempt Belsomra. Recommend avoiding anticholinergics and antihistamines. Repeat LFT/ CMP for increasing Lamictal. The patient continues to have bothersome worsening RBD and bruxism will consider the addition of clonazepam.        Care Management Plan:     #Insomnia   We recommend compliance with CPAP after long discussion   Continue Melatonin 4 mg, if worsening sleep will start Belsomra 5 mg   Continue Gabapentin 100mg HS   We discussed of  sleep behavior strategies for realigning circadian rhythm.  We discussed daytime  activities to keep the patient is stimulated and active both socially and cognitively.   May consider low-dose clonazepam for bruxism and REM sleep behavioral disorder if symptoms worsened  #Neurocognitive Disorder Treatment:   Continue Donepezil 10 mg q.day   Increase Lamictal to 25 mg BID after LFTs   Avoid antihistamines, and agents that augment NE/DA as much as possible   Continue Vitamin D3 50,000IU once per month  #Microvascular Disease Management:   Continue rosuvastatin 20mg   Continue aspirin 81 mg  #Behavioral/Environmental Treatment   We recommend engaging in activities that stimulate cognitively and socially while avoiding excessive stimulation and fatigue in overwhelmingly complex situations.   We recommend integrating routine and schedule into your daily life. https://www.alzheimersproject.org/news/the-importance-of-routine-and-familiarity-to-persons-with-dementia/  #Health Maintenance/Lifestyle Advice   We have discussed the value in aggressively controlling vascular risk factors like hypertension, hyperlipidemia, and Diabetes SBP<130, LDL<100, A1C<7.0.   We discussed the need to optimize lifestyle choices including a heart-healthy diet (e.g., Mediterranean or DASH), increased cardiovascular exercise (goal 150 minutes of moderate-intensity per week), and stay cognitively and socially active.  #Support   We all need support sometimes. Get easy access to local resources, community programs, and services. https://www.communityresourcefinder.org/   Learn more about Cognitive Impairment in [place]: https://www.alz.org/professionals/public-health/state-overview/[place]   Learn more about your local community's dementia and neurocognitive impairment resources in [place]: https://www.alz.org/[place]/helping_you   Learn more about your local support for dementia: https://www.alz.org/[place]/helping_you/support   Learn more about housing support for people living with dementia in  "[place]: https://www.memorycare.com/memory-care-in[place]/   Learn more about financial support for people living with dementia in [place]: https://www.COVEGA.com/financial-assistance/   Learn more about caregiver support with the Family Caregiver [place]: http://www.caregiver.org  #Safety   Individuals living with Alzheimer's disease and other dementias are at increased risk for injury or harm in certain areas of the home. As the disease progresses, they may become unaware of the dangers that exist. Consider taking the following precautions to create a safe environment that can prevent dangerous situations from occurring and help maximize independence for as long as possible. https://www.alz.org/help-support/resources/home_safety_checklist   [place] has no laws against driving with dementia specifically but obviously has laws about medical conditions which impact a person's ability to drive safely. If you believe your loved one's driving capacity has diminished, please reach out to either your primary care physician or our office to discuss driving restrictions or revocation of their license. To learn more: https://www.COVEGA.com/caregiverinfo/driving-problems/   The Alzheimer's Association administers the nationwide "Safe Return" program with identification bracelets, necklaces, or clothing tags and 24-hour assistance. More information is available online at https://www.alz.org/help-support/caregiving/safety/medicalert-with-24-7-wandering-support  #Follow up:   Follow-up in 24 weeks (Sep 2022).    Thank you for allowing us to participate in the care of your patient. Please do not hesitate to contact us with any questions or concerns.     It was a pleasure seeing Ms. Garcia and we look forward to seeing them at their follow-up visit.     This note is dictated on M*Modal Fluency Direct word recognition program. There are word recognition mistakes that are occasionally " missed on review.      Scheduled Follow-up :  Future Appointments   Date Time Provider Department Center   8/10/2022  1:30 PM BRENDEN Bailey Jr., MD [facility] [place]   12/5/2022  2:40 PM [facility] [facility]       After Visit Medication List :     Medication List          Accurate as of March 22, 2022 11:11 AM. If you have any questions, ask your nurse or doctor.            CONTINUE taking these medications    donepeziL 10 MG tablet  Commonly known as: ARICEPT  Take 1 tablet (10 mg total) by mouth every evening.     ergocalciferol 50,000 unit Cap  Commonly known as: ERGOCALCIFEROL     gabapentin 100 MG capsule  Commonly known as: NEURONTIN  Take 1 capsule (100 mg total) by mouth every evening.     GAS-X MAXIMUM STRENGTH ORAL     lamoTRIgine 25 MG tablet  Commonly known as: LAMICTAL  Take 1 tablet (25 mg total) by mouth once daily.     midodrine 5 MG Tab  Commonly known as: PROAMATINE  TAKE 1 TABLET (5 MG TOTAL) BY MOUTH 3 (THREE) TIMES DAILY WITH MEALS.     rosuvastatin 20 MG tablet  Commonly known as: CRESTOR  Take 1 tablet (20 mg total) by mouth once daily.     UNABLE TO FIND     UNABLE TO FIND     UNABLE TO FIND     UNABLE TO FIND     UNABLE TO FIND            Signing Physician:  Phill Villanueva MD    Billing:    -----------------------------------------------------------------------------    I performed this consultation using real-time Telehealth tools, including a live video connection between my location and the patient's location. Prior to initiating the consultation, I obtained informed verbal consent to perform this consultation using Telehealth tools and answered all the questions about the Telehealth interaction. The participants understand that only a limited neurological exam and limited neuropsychological testing can be performed using Telehealth tools.    I spent a total of 30 minutes (time-in: 10:30 AM; time-out: 11:15 AM) on 03/22/2022, in-person face-to-face  with the patient and caregiver(s), >50% of that time was spent counseling regarding the symptoms, treatment plan, risks, therapeutic options, lifestyle modifications, and/or safety issues for the diagnoses above.    10/14 Review of Systems completed and is negative except as stated above in HPI (Systems reviewed: Const, Eyes, ENT, Resp, CV, GI, , MSK, Skin, Neuro)    I reviewed the summation of records from outside physicians for a total of 10 minutes on 03/22/2022. Reviewed and summation of records from an outside physician was performed as summarized above in HPI    I performed a neurobehavioral status examination that included a clinical assessment of thinking, reasoning, and judgment. Please see above HPI and ROS for full details. This exam was performed on 03/22/2022 and included 11 minutes spent on direct face-to-face clinical observation and interview with the patient and 20 minutes spent interpreting test results and preparing the report. The total time of 31 minutes spent on the neurobehavioral status examination is not included in the time spent on evaluation and management coding.    I provided prolonged explanation to the family regarding the nature of the diagnosis and the need for their support in the therapy process including an explanation psychiatric exams, medical exams, neurocognitive testing, and other accumulated data for a total of 10 minutes on 03/22/2022. This information was provided to family members/caregivers to give them advice on how to help care for the patient.    Total Billing time spent on encounter/documentation for this patient's evaluation and management, not including the neurobehavioral status examination: 43 minutes.

## 2022-03-23 RX ORDER — LAMOTRIGINE 25 MG/1
25 TABLET ORAL 2 TIMES DAILY
Qty: 180 TABLET | Refills: 1 | Status: SHIPPED | OUTPATIENT
Start: 2022-03-23 | End: 2022-08-30 | Stop reason: SDUPTHER

## 2022-03-28 ENCOUNTER — PATIENT MESSAGE (OUTPATIENT)
Dept: NEUROLOGY | Facility: CLINIC | Age: 77
End: 2022-03-28
Payer: MEDICARE

## 2022-04-28 ENCOUNTER — TELEPHONE (OUTPATIENT)
Dept: NEUROLOGY | Facility: CLINIC | Age: 77
End: 2022-04-28
Payer: MEDICARE

## 2022-04-28 NOTE — TELEPHONE ENCOUNTER
Attempted to contact CG for monthly [facility] visit.  There was no answer, and I left a voicemail message.

## 2022-05-05 ENCOUNTER — PATIENT MESSAGE (OUTPATIENT)
Dept: RESEARCH | Facility: HOSPITAL | Age: 77
End: 2022-05-05
Payer: MEDICARE

## 2022-05-13 ENCOUNTER — TELEPHONE (OUTPATIENT)
Dept: NEUROLOGY | Facility: CLINIC | Age: 77
End: 2022-05-13
Payer: MEDICARE

## 2022-05-13 NOTE — PROGRESS NOTES
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  PWD's family came to town for a reunion, and that went well.  We discussed how CG is handling the changes.  I recommended a support group and will send the flyer to her with information about [facility] group and education series.  I thanked her for participating in the project and encouraged her to call if she needs anything in the future.     "

## 2022-07-05 ENCOUNTER — PATIENT MESSAGE (OUTPATIENT)
Dept: NEUROLOGY | Facility: CLINIC | Age: 77
End: 2022-07-05
Payer: MEDICARE

## 2022-07-21 ENCOUNTER — PATIENT MESSAGE (OUTPATIENT)
Dept: NEUROLOGY | Facility: CLINIC | Age: 77
End: 2022-07-21
Payer: MEDICARE

## 2022-07-21 ENCOUNTER — TELEPHONE (OUTPATIENT)
Dept: NEUROLOGY | Facility: CLINIC | Age: 77
End: 2022-07-21
Payer: MEDICARE

## 2022-07-21 NOTE — TELEPHONE ENCOUNTER
Returned call to daughter Aliza.  Aliza reports that since patient experienced fall she has had changes including return of hallucinations, increased anxiety.  Patient went to ED post fall on 6.25.22.  Has an appointment with the orthopedist and will take Xanax 0.5mg before her Synvisc injection to left knee, which she receives every 6 months.  Aliza wants to check with Dr. Villanueva if it is ok for patient to take Xanax due to concerns about her LBD, and is requesting him to review ED note, and if he is advising a follow up visit.  Dr. Villanueva routed this message for review and follow up.  I told Aliza I would return her message on the portal, and she thanked me for returning her call.

## 2022-07-21 NOTE — TELEPHONE ENCOUNTER
----- Message from Jackie Santiago sent at 7/21/2022 12:22 PM CDT -----  Contact: [phone]  I have Juli Gallardo pt's daughter is calling to speak with staff about her mother's medication for pain meds, and if she should take them and are there going to be any side effects.    Pt fell a few weeks again and hit her head, pt went to the er and they released her and they contacted Dr Villanueva's off but no one called back.    Pt is having system changes, she has been having hallucinations  [phone]

## 2022-07-22 ENCOUNTER — PATIENT MESSAGE (OUTPATIENT)
Dept: NEUROLOGY | Facility: CLINIC | Age: 77
End: 2022-07-22
Payer: MEDICARE

## 2022-08-07 PROBLEM — F02.80 FRONTOTEMPORAL DEMENTIA: Chronic | Status: ACTIVE | Noted: 2021-08-24

## 2022-08-07 PROBLEM — F33.1 MAJOR DEPRESSIVE DISORDER, RECURRENT, MODERATE: Chronic | Status: RESOLVED | Noted: 2020-04-08 | Resolved: 2022-08-07

## 2022-08-07 PROBLEM — G31.83 LEWY BODY DEMENTIA WITHOUT BEHAVIORAL DISTURBANCE: Chronic | Status: ACTIVE | Noted: 2021-05-05

## 2022-08-07 PROBLEM — G31.09 FRONTOTEMPORAL DEMENTIA: Chronic | Status: ACTIVE | Noted: 2021-08-24

## 2022-08-07 PROBLEM — F02.80 LEWY BODY DEMENTIA WITHOUT BEHAVIORAL DISTURBANCE: Chronic | Status: ACTIVE | Noted: 2021-05-05

## 2022-08-07 PROBLEM — M17.9 OSTEOARTHRITIS OF KNEE: Chronic | Status: ACTIVE | Noted: 2020-05-09

## 2022-08-09 ENCOUNTER — PATIENT MESSAGE (OUTPATIENT)
Dept: NEUROLOGY | Facility: CLINIC | Age: 77
End: 2022-08-09

## 2022-08-09 ENCOUNTER — OFFICE VISIT (OUTPATIENT)
Dept: NEUROLOGY | Facility: CLINIC | Age: 77
End: 2022-08-09
Payer: MEDICARE

## 2022-08-09 DIAGNOSIS — G47.52 RBD (REM BEHAVIORAL DISORDER): ICD-10-CM

## 2022-08-09 DIAGNOSIS — G31.09 FRONTOTEMPORAL LOBAR DEGENERATION: Primary | ICD-10-CM

## 2022-08-09 DIAGNOSIS — T78.40XS SENSITIVITY TO MEDICATION, SEQUELA: ICD-10-CM

## 2022-08-09 DIAGNOSIS — G47.00 INSOMNIA, UNSPECIFIED TYPE: ICD-10-CM

## 2022-08-09 DIAGNOSIS — I95.1 ORTHOSTATIC HYPOTENSION: ICD-10-CM

## 2022-08-09 DIAGNOSIS — F42.4 EXCORIATION (SKIN-PICKING) DISORDER: ICD-10-CM

## 2022-08-09 DIAGNOSIS — G47.01 INSOMNIA DUE TO MEDICAL CONDITION: ICD-10-CM

## 2022-08-09 DIAGNOSIS — R45.86 MOOD SWINGS: ICD-10-CM

## 2022-08-09 DIAGNOSIS — F02.80 FRONTOTEMPORAL LOBAR DEGENERATION: Primary | ICD-10-CM

## 2022-08-09 DIAGNOSIS — R44.3 HALLUCINATION: ICD-10-CM

## 2022-08-09 PROCEDURE — 99215 PR OFFICE/OUTPT VISIT, EST, LEVL V, 40-54 MIN: ICD-10-PCS | Mod: 95,,, | Performed by: PSYCHIATRY & NEUROLOGY

## 2022-08-09 PROCEDURE — 99215 OFFICE O/P EST HI 40 MIN: CPT | Mod: 95,,, | Performed by: PSYCHIATRY & NEUROLOGY

## 2022-08-09 RX ORDER — MIDODRINE HYDROCHLORIDE 10 MG/1
10 TABLET ORAL
Qty: 90 TABLET | Refills: 2 | Status: SHIPPED | OUTPATIENT
Start: 2022-08-09 | End: 2022-09-14 | Stop reason: SDUPTHER

## 2022-08-09 RX ORDER — SUVOREXANT 10 MG/1
1 TABLET, FILM COATED ORAL NIGHTLY
Qty: 30 TABLET | Refills: 3 | Status: SHIPPED | OUTPATIENT
Start: 2022-08-09 | End: 2022-08-30 | Stop reason: ALTCHOICE

## 2022-08-09 NOTE — PROGRESS NOTES
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 5 mg for trial for sleep-related issues.   In addition the patient continues to have mild cyclothymic tendencies with mood swings bothersome to the patient and her family.   We discussed medication options in the past.   Recommended trial of low-dose Lamictal 25 mg. Side effects have been discussed with her family.   her family is advised to watch for rashes.   Will repeat LFTs and CBC at 1 and 3 months. Additional things discussed today include genetic testing.   her family would like to pursue genetic testing at this time.   We will screen for standard causes of FTLD with [employer] neurodegenerative panel.  o Primary Care Provider (02/08/2022)  - Type: Chart Review. Christine Garcia is a 76 y. O. Female with history of atrial septal aneurysm, thoracic atherosclerosis, hypercholesterolemia, depression, dementia, relative bradycardia, vasodepressive type of vasovagal.  o Specialist: cardiology (03/22/2022)  - Type: Chart Review. Christine Garcia is a 76 y. O. Female with history of atrial septal aneurysm, thoracic atherosclerosis, hypercholesterolemia, depression, dementia, relative  bradycardia, vasodepressive type of vasovagal.  o Hospitalization (06/25/2022)  - Type: Chart Review. Last time seen, the patient was started on low-dose Lamictal. Gabapentin was continued 100 mg. Belsomra does not appear to have been started. Donepezil was continued 10 mg q. Day. Since starting Lamictal her family report improvement in cycling behavior. Though there has been a recent up taking last 1-2 weeks of anxiety her daughter reports general improvement in her cyclothymic tendencies with variable highs of panic/ anxiety and confusion. Recommend increasing 25 mg once a day to twice a day following liver function tests which will be completed next 2-3 days. Other issues addressed today include longstanding insomnia. The patient is responsive to 4 mg melatonin extended release. her family does report however in recent weeks the patient has been exhibiting more REM sleep behavioral disorder. The patient has a long history of bruxism for which she is in the process of getting a mouth guard. However recently fit the patient has been noted to be talking her sleep more often. her family report minimum 1 episode in recent past where she woke up in a frantic state. her  thinks she was still sleep however was actively in distress and thought she saw things on the ground. Is not entirely Seroquel she is certain whether not these were hallucinations or REM sleep behavior disorder however her  who was present thinks she was still asleep. Review of previous medical records indicate the patient has been recently seen by Cardiology. The patient has a prior diagnosis of orthostatic hypotension. The patient is currently treated on midodrine. The patient there are no current side effects on midodrine. Otherwise the patient has improved compared to last time seen. Cycling behavior has not fully improved but however is going in the right direction. Sleep seems to be under control aside from bothersome REM sleep  behavioral disorder. We discussed potential medications for controlling REM sleep behavior disorder which include clonazepam however given prior medication sensitivities recommend increasing Lamictal 1st before considering the addition of benzodiazepines. As patient's sleep is currently under control do not recommend switching the patient from melatonin to Belsomra at this time. Ramelteon was not covered by insurance. At the observations and made above were discussed with the patient and her family. 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 The patient's clinical syndrome was initially masked by polypharmacy as we recommend monitoring for symptoms mentioned above. Now that we have tapered off previous medications, her clinical presentation is becoming more clear. Patient's presentation is becoming to look more akin to Lewy body dementia   Given neuroleptic sensitivity, REM sleep behavioral disorder, personal familial history of mood disorder, significant fluctuations in arousal/mood  however at this time the patient does not have overt parkinsonism nor vivid well-formed hallucinations. Recommend increasing Lamictal from 25 mg to 25 mg b. I. D. Following LFTs. Continue melatonin 4 mg. If needed will attempt Belsomra. Recommend avoiding anticholinergics and antihistamines. Repeat LFT/ CMP for increasing Lamictal. The patient continues to have bothersome worsening RBD and bruxism will consider the addition of clonazepam. Type: Chart Review. 76-year-old female with history of hypertension, hyperlipidemia, frontotemporal dementia presents to the emergency department for reported syncopal episode. her  reports he could see the patient standing at the bathroom sink then when he looked away he heard the patient fall, but did not witness it. Reports that she fell backwards and was unresponsive momentarily after the fall. No seizure-like activity  that was witnessed. The patient sustained a wound to the back of her head, lower lip into her chin and broke her left upper veneer. The patient reports no significant pain at this time. She is not anticoagulated. Last tetanus vaccine 2014.     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 On 06/25/2022 the patient had a syncopal episode likely secondary to orthostatic hypotension while standing at the bathroom sink. The patient fell backwards hitting her face break your teeth on the sink becoming unresponsive for a moment. CT head showed no acute intracranial trauma. Regardless following this event her family believes there has been a decompensation. As primary historian, the patient's daughter, has been away on vacation for the last month is unclear when patient's Behavior in sleep began to decompensate. Since last time seen patient's sleep has become more fragmented. She is frequently having REM sleep behavior disorder but frequently talking acting out dreams. She reports increasing daytime fatigue, worsening comprehension/confusion, worsening excoriation disorder, in increasing mood swings. We discussed medication alternatives for various issues. Recommend focusing on sleep by increasing melatonin to Belsomra. Recommend low risk strategies such as increasing and acetylcysteine for excoriation disorder. Given patient's worsening lightheadedness responsive to midodrine recommend increasing from 5 mg to 10 mg b. I. D. Over 3 weeks. We discussed patient's clinical presentation B-mode were consistent with Lewy body dementia at this time however frontal temporal atrophy still remains atypical for syndrome. We have discussed biomarker testing in the form of the Syn-One skin biopsy for alpha-syn. her family is interested. We we will provide reading material.   Unresolved Concern(s) reported by patient/family:  o CPAP  noncompliance  o Anxiety/cycling mood - improved on low dose lamictal  o RBD/Bruxism - hesitant to start clonazepam however will consider if SXS get worse  o Insomnia/Advanced sleep phase disorder - ramelteon not covered, stable on melatonin, has belsomra if needed        Review of cognitive, visuospatial, motor, sensory, and behavioral systems:     Memory:    Ms. Emerson memory has worsened in the past few years.   She does have difficulty remembering recent events.   She does have difficulty remembering recent important conversations.   She does repeat statements or asks the same question repeatedly.   She does forget information within minutes.   She does misplace personal items (e.g., keys, cell phone, wallet) more frequently.   She does have difficulty keeping track of her medications.   She does forget people's names more frequently.   Her remote memory is intact.  Attention:    She does become easily distracted.   Her attention and concentration are impaired.   She does not have attentional fluctuations.   She does have difficulty with selective attention.   She does have difficulty with divided attention.   She does have difficulty with executive attention.  Executive:    Ms. Emerson cognitive processing speed is slower.   She does not have word-finding difficulties.   She does have difficulty with working memory.   She does not have difficulty with planning/organizing/completing multistep tasks.   She does not have difficulty with flexible thinking.   She does not have difficulty with response inhibition.   Her judgment is impaired.  Language:    Her speech output is not unaffected.   Ms. Emerson speech is fluent and non-effortful.   Ms. Emerson speech is grammatically intact.   She does not make word substitutions.   She does not have difficulty reading.   She does not appear to have impaired comprehension.  Visuospatial:    She does not have difficulty recognizing  objects or faces.   Ms. Garcia does have visuospatial disorientation.   She does not get lost in familiar places.   She does have trouble navigating.   She has become confused or disoriented in *new*, unfamiliar places.  Motor/Coordination:    Ms. Garcia does not have difficulty with walking.   She does not feel imbalanced.   She denies having fallen.   She does not appear to have new muscle weakness.   She does have difficulty buttoning shirts, operating zippers, or manipulating tools/utensils.   Her handwriting has not become micrographic.   She does not have a resting tremor.   She denies having any new involuntary movements and/or muscle jerking.   She does not have swallowing difficulty.   She denies new muscle cramps and twitching.  Sensory:    Ms. Garcia denies new numbness, tingling, paresthesias, or pain.   Ms. Garcia denies a loss of vision, blurry vision, or double vision.   Ms. Garcia denies new loss of hearing or worsening tinnitus.  Sleep:    Ms. Garcia reports difficulty sleeping. Comment: 4/2020: She sleeps well. 4/2021: She uses CPAP to treat obstructive sleep apnea (since fall 2020), but this is inconsistent. Her  said she has conversations in her sleep or will seem to be awake but still be sleeping. No significant motor movements in sleep.   Ms. Garcia does have difficulty going to sleep.   Ms. Garcia reports difficulty staying asleep and/or frequently awakening at night.   Ms. Garcia does snore and/or have witnessed apneas while sleeping. Comment: She uses CPAP to treat obstructive sleep apnea (since fall 2020), but this is inconsistent.   When she wakes up in the morning, she does feel well-rested.   She has been reported to have dream-enactment behavior. Comment: Her  said she has conversations in her sleep or will seem to be awake but still be sleeping. No significant motor movements in sleep.   She denies symptoms suggestive of restless leg  syndrome.  Behavior:    Ms. Garcia's personality has changed.   She does have symptoms of disinhibition and social inappropriateness.   She is exhibiting symptoms to suggest a loss of manners and/or decorum.   She is exhibiting new symptoms that suggest they have become more impulsive, rash and/or careless.   She does not have apathy and/or decreased motivation. Comment: 4/2020: Her daughter noted reduced engagement in pleasurable activities. 4/2021: Significant, per daughter. She mostly sits in her chair and stares. Ms. Garcia said she does needlepoint, but her daughter said this is not consistent. She was prescribed Ritalin.   She does appear to have had a change in behavioral/emotional inertia.   Ms. Melaras emotional expression has changed.   She does have emotional blunting or lability.   She has been reported to have new symptoms of agitation, aggression, or violent outbursts. Comment: 4/2020: Resistance to taking medication but will accept feedback and support from family without defensiveness. 4/2021: None reported.   She does have symptoms of irritability and mood lability. Comment: 4/2020: Her daughter noted some irritability due to stress. 4/2021: Irritability with her  in the context of caregiving.   Her insight into his disease and situation is impaired.   Her personal hygiene is intact.   She is exhibiting a diminished response to other people's needs and feelings? Comment: patient has consistently demonstrated decrease in appetite and response to both her  and others. Patient has been hardly a necessarily mean to her  who has recently had a stroke and has become physically disabled following this.   She is exhibiting diminished social interest, interrelatedness, and/or personal warmth.   She does appear restless.   She denies new and/or worsening simple repetitive behaviors.   Her speech has not become simplified or become repetitive/stereotyped.   She  reports symptoms that are suggestive of new/worsening complex repetitive/ritualistic compulsions and behaviors.   She does not have symptoms of hyper-religiosity or dogmatism.   Her interests/pleasures have not become restrictive, simplified, interrupting, or repetitive.   She is exhibiting symptoms to suggest an increase in self-stimulating behavior. Comment: 4/2021: Picking behaviors, which progressed beyond her face to her back and chest despite changes in medications. Triple-checking door locks at night.   She has had changes in eating behavior. Comment: Patient is engaged in freeing frequent binge eating often getting 10-20 lb a time losing it however patient's of food patterns have not otherwise become for strict it. Per family she has always had a sweet tooth however the cycling and range of this behavior has increased   She has been exhibited symptoms to suggest increased consumption of food and/or alcohol/cigarettes.   She denies oral exploration or consumption of inedible objects.  Psychiatric:    She does feel depressed. Comment: 4/2020: Ms. Garcia said she feels sad at times. Her daughter said she has noticed depressed mood and lack of self care. Her daughter indicated friends and medical providers have noticed changes. 4/2021: Improvement in depressed mood with medication but tearful at times without a clear cause. Her daughter said she is more timid and unsure.   She is exhibiting symptoms of social withdrawal/indifference.   She does have anxiety. Comment: 4/2020: None reported. 4/2021: Significant anxiety, at times (e.g., riding in car, around the house, going to an art class), but no anxiety at other times.   She does exhibit cycling behavior.   She does exhibit hyperactive behavior.   She is not exhibited symptoms of paranoia.   She does have delusions.   She does have hallucinations. Comment: 4/2020: None reported 4/2021: After her ankle fracture, she saw snakes on the floor of the  bedroom at night and during the day. She was not taking pain medication at the time. Around , she saw and communicated with two little children in her courtyard. One evening in December, she saw and talked with their  dog, who she saw in a tree outside the window.   She does have a history of sensitivity to neuroleptic/psychotropic medications.  Medical Review of Systems:    Ms. Garcia does not have constipation.   Ms. Garcia does have urinary incontinence. Comment: urinary incontinence/urge as of 2021   Ms. Garcia does have anosmia.   Ms. Garcia reports symptoms that are suggestive of orthostatic lightheadedness.   Ms. Garcia's weight is unstable. Comment: 2020: Her daughter reported changes in diet. She eats less when depressed but eats sweets and coke. Her daughter is doing the grocery shopping. 2021: Her daughter has noticed some weight loss  Functional status:   Difficulty performing the following Instrumental ADLs:  o Household chores: Yes  o Food Preparation: Yes  o Shopping: Yes  o Ability to Handle Finances: Yes  o Transportation/Driving: Yes  o Household Appliances/Stove: Yes  o Laundry: Yes   Difficulty performing the following Basic ADLs:  o Dressing: No  o Bathing: No  o Toileting: No  o Personal hygiene and grooming: No  o Feeding: No  Care Management:   Patient/Family Safety Concerns:  o Medication Adherence: Yes  o Home Safety: No  o Wandered: No  o Firearms: No  o Fall Risk: No  o Home Alone: No       Past Medical History:   Diagnosis Date    Dyslipidemia 10/2/2015    Fall 2020    with fracture    Hyperlipidemia     Hypertension     minimal    Migraines     Obesity 10/2/2015    Patent foramen ovale 10/2/2015       Past Surgical History:   Procedure Laterality Date    COLONOSCOPY N/A 2017    Procedure: COLONOSCOPY;  Surgeon: Gerber Naylor MD;  Location: [facility];  Service: Endoscopy;  Laterality: N/A;    HYSTERECTOMY      KNEE  ARTHROPLASTY Bilateral 2020    meniscus repair    left elbow      left knee      OOPHORECTOMY      OPEN REDUCTION AND INTERNAL FIXATION (ORIF) OF INJURY OF ANKLE Right 11/3/2020    Procedure: ORIF, ANKLE;  Surgeon: Ted Rome MD;  Location: [facility];  Service: Orthopedics;  Laterality: Right;       Family History   Problem Relation Age of Onset    Diabetes Father     Heart disease Father     COPD Father     Lung cancer Brother     Cancer Sister        Social History     Socioeconomic History    Marital status:      Spouse name: josé   Tobacco Use    Smoking status: Former Smoker     Packs/day: 1.00     Years: 20.00     Pack years: 20.00     Types: Cigarettes     Quit date:      Years since quittin.6    Smokeless tobacco: Never Used    Tobacco comment: 1-2ppd   Substance and Sexual Activity    Alcohol use: Yes     Alcohol/week: 1.0 standard drink     Types: 1 Shots of liquor per week     Comment: occ    Drug use: Never    Sexual activity: Yes     Partners: Male     Birth control/protection: Post-menopausal, None     Social Determinants of Health     Financial Resource Strain: Low Risk     Difficulty of Paying Living Expenses: Not hard at all   Food Insecurity: No Food Insecurity    Worried About Running Out of Food in the Last Year: Never true    Ran Out of Food in the Last Year: Never true   Transportation Needs: No Transportation Needs    Lack of Transportation (Medical): No    Lack of Transportation (Non-Medical): No   Physical Activity: Inactive    Days of Exercise per Week: 0 days    Minutes of Exercise per Session: 0 min   Stress: Stress Concern Present    Feeling of Stress : Very much   Social Connections: Unknown    Frequency of Communication with Friends and Family: Never    Frequency of Social Gatherings with Friends and Family: Once a week    Active Member of Clubs or Organizations: No    Attends Club or Organization Meetings: Never    Marital  Status:    Housing Stability: Low Risk     Unable to Pay for Housing in the Last Year: No    Number of Places Lived in the Last Year: 1    Unstable Housing in the Last Year: No       Medication:     Current Outpatient Medications on File Prior to Visit   Medication Sig Dispense Refill    cholecalciferol, vitamin D3, 1,250 mcg (50,000 unit) capsule Take 50,000 Units by mouth every 7 days.      donepeziL (ARICEPT) 10 MG tablet TAKE 1 TABLET BY MOUTH EVERY DAY IN THE EVENING 90 tablet 3    ergocalciferol (ERGOCALCIFEROL) 50,000 unit Cap Take 50,000 Units by mouth every Sunday.       gabapentin (NEURONTIN) 100 MG capsule Take 1 capsule (100 mg total) by mouth every evening. 30 capsule 3    lamoTRIgine (LAMICTAL) 25 MG tablet Take 1 tablet (25 mg total) by mouth 2 (two) times daily. 180 tablet 1    lubiprostone (AMITIZA) 8 MCG Cap Take 1 capsule (8 mcg total) by mouth 2 (two) times daily with meals. 60 capsule 5    midodrine (PROAMATINE) 5 MG Tab Take 1 tablet (5 mg total) by mouth 3 (three) times daily with meals. 270 tablet 1    rosuvastatin (CRESTOR) 20 MG tablet Take 1 tablet (20 mg total) by mouth once daily. 90 tablet 3    simethicone (GAS-X MAXIMUM STRENGTH ORAL) 2 (two) times a day.       UNABLE TO FIND       UNABLE TO FIND       UNABLE TO FIND       UNABLE TO FIND       UNABLE TO FIND        No current facility-administered medications on file prior to visit.        Review of patient's allergies indicates:   Allergen Reactions    Sucrose Other (See Comments)     GI issues -       Medications Reconciliation:   I have reconciled the patient's home medications and discharge medications with the patient/family. I have updated all changes.  Refer to After-Visit Medication List.    Objective:  Vital Signs:  There were no vitals filed for this visit.  Wt Readings from Last 3 Encounters:   06/27/22 1454 79.2 kg (174 lb 11.2 oz)   04/07/22 1336 83.5 kg (184 lb 1.6 oz)   03/30/22 1246 84.4 kg (186  lb 1.1 oz)     There is no height or weight on file to calculate BMI.     Neurological examination:    Mental Status:    Ms. Garcia is awake; Her energy level appeared normal.    Comment: significant improvement in energy level compared to last time seen no longer appears hypomanic   Her appearance is normal (hygiene is appropriate; attire is proper and clean).    Comment: patient is clean and more attentive to details significant improvement compared to last time seen   She has appropriate attention/concentration (NADEEM/WARNER forwards and backward).   She can complete three-step commands.   Her thought process is logical and goal-oriented.   She has evidence of hallucinations.   She has evidence of delusions.   She demonstrated impaired judgment based on actions and plans for the future.    Comment: Impaired Judgement   She demonstrated impaired insight based on actions, awareness of her illness, plans for the future.    Comment: Impaired insight   Throughout the interview, she is cooperative, her eye contact is appropriate.    Comment: significant improvement in exaggerated over familiar behavior does still appear to impaired social barriers however this is significant improvement compared to last time seen  Cranial Nerves:    Her saccadic initiation, velocity, and amplitude are normal.   Her eyelid assessment showed no apraxia. There was no eyelid dysfunction, retraction, or somers sign.   Her facial strength was normal.   Her facial expression was symmetric and appropriate to the context.   She does not require the use of hearing aids.   Her tongue showed no evidence of scalloping.   She can protrude their tongue beyond Her lips for >10 sec.   She can move their extended tongue back and forth rapidly.   She had no significant evidence of anterocollis or retrocollis.  Speech/Language:    Ms. Garcia's speech was fluent, non-effortful, and her rate was appropriate to the context.   Her  "speech timbre is abnormal.    Comment: loud   Her speech rate is abnormal.    Comment: fast/pressured   Her respirations are within normal range and appropriate to context.   Her speech timbre is normal.   She has no articulation (segmental features) errors.   She has no speech dysdiadochokinesia with repetition of syllables such as "/PA/, /TA/, /KA/, /OM/".   She made no errors during the repetition of rapid syllables and or words such as "caterpillar" "", and "huckleberry"   She has no repetition errors of rapid sequences of consonants, such as in "Mormonism Mormonism" or "[employer]".   She has no prosody (suprasegmental features) errors.   Her stress assessment showed no repetition errors in linguistically complex words, including multisyllabic words ("planetarium," "questionable," "accomplishment," "phonetic.   Ms. Emerson speech is not dysarthric.   Ms. Emerson speech was without evidence of anomia.   She showed no evidence of anomia during spontaneous speech.   She showed no evidence of anomia during confrontational naming; 12/12 (correct chocolate bar, kangaroo, theater, Latter day, doctor, potato, battery, ice cube tray, thermometer, flower, bomb, and calendar).   She makes no phonological loop errors.   She makes no errors during the repetition of gibberish words (e.g., "Supercalifragilisticexpialidocious," "Pigglywiggly," "Woospiedoo," "Zowzy," "[place]").   She makes no errors during the repetition of complex meaningless phrases (e.g., "The horse raced past the barn fell.", "The complex houses  and single soldiers and their families," "Wishes are hopping, and trees are west," and "Brushing liked to yvonne salamanca's direction").   She can comprehend commands that cross the midline (e.g., with your left thumb, touch your right ear).   She can comprehend commands that depend on syntax (e.g., point to the ceiling after you point to the floor).   Her speech is " grammatically intact; (no function/semantic word substitutions, phonemic/semantic paraphasias, or binary confusion).  Motor:    Assessment of motor strength was symmetric and at minimal anti-gravity.   There is no pronator or downward drift.   There is no upward drift.   There is no outward/diagonal drift.   Deltoid L +5/5 R +5/5 Biceps L +5/5 R +5/5 Triceps L +5/5 R +5/5 Wrist extension L +5/5 R +5/5 Finger abduction L +5/5 R +5/5 Hip flexion L +5/5 R +5/5 Hip extension L +5/5 R +5/5 Knee flexion L +5/5 R +5/5 Knee extension L +5/5 R +5/5 Ankle flexion L +5/5 R +5/5 Ankle extension L +5/5 R +5/5   There is no myoclonus observed in Ms. Garcia's bilateral upper and lower extremities.   There are no fasciculations observed in Ms. Garcia's bilateral upper and lower extremities.  Coordination:    She has no bilateral upper extremity limb dysmetria or past pointing on finger-nose-finger bilaterally.   She has no bilateral lower extremity limb dysmetria during shin rub.   She has no limb dysdiadochokinesia of the upper extremity on the pronation/supination test and screwing in a light bulb or lower extremity during tapping ball of each foot bilaterally.   She has no cerebellar rebound bilaterally.   She has no visible tremor.   She has no kinetic tremor bilaterally.   She has no postural tremor bilaterally.   She has no resting tremor bilaterally.   She has evidence of interhemispheric motor control deficits.   She demonstrates evidence of motor overflow.   She demonstrates alien limb phenomena.   She has dyskinetic movements.    Comment: on presentation patient has mild oral facial dyskinesias improved compared to baseline 3 months prior   She has no athetosis.   She has no choreiform movements.   She has no ballistic movements.   She has evidence of akathisia.    Comment: mild significant improvement compared to last time seen   She has evidence of tardive dyskinesia.    Comment: mild  significant improvement compared to last time seen   Ms. Garcia's bilateral upper extremity coordination with finger tapping, pronation/supination, and the open-close fist showed no slowing, no hypometria, and no dysrhythmia inconsistent with bradykinesia.   Ms. Garcia's bilateral upper extremity coordination with finger tapping, pronation/supination, and the open-close fist showed slowing.   Ms. Garcia's bilateral upper extremity coordination with finger tapping, pronation/supination, and the open-close fist showed hypometria.   Ms. Garcia's bilateral upper extremity coordination with finger tapping, pronation/supination, and the open-close fist showed dysrhythmia.      Neuropsychological Evaluation Summary:     Prior Neurocognitive/Neuropsychological Evaluations   Summary from EMR:  o Informal Neuropsych testing in December 2020  o MoCA = 24/30  o Informal Neuropsych testing in 3/17/21  o Mini Mental Status Exam [MMSE] = 26/30  o MoCA = 24/30  o Informal Neuropsych testing in 4/2/21  o [place] Cognitive Assessment [MoCA] = 20/22).  o She had difficulty with auditory attention and delayed recall (2/5 words, 3/3 words with cueing).  o Formal Neuropsych testing in 4/12/21  o Ms. Garcia completed a neuropsychological evaluation in June 2020. Tests administered included: MoCA; MSVT; Test of Premorbid Functioning; Wechsler Adult Intelligence Scale, Fourth Edition (WAIS-IV) selected subtests; Wechsler Memory Scale, Fourth Edition (WMS-IV), selected subtests; Neuropsychological Assessment Battery (NAB), selected subtests; Verbal fluency tests (FAS & animal naming; Lu et al., 2004 norms); Ellsworth Verbal Learning Test, Revised (HVLT-R; Form 1); Brandon Making Test, parts A and B (Lu et al., 2004 norms); [place] Card Sorting Test-128 (WCST- 128; computer version), Oscar Complex Figure Test, Geriatric Depression Scale (GDS-30); Lees Anxiety Inventory (TABATHA).  o Ms. Garcia's performance on neuropsychological  testing suggested deficits in areas including executive functioning (with impact on aspects of visuospatial functioning), learning and retention of information, and attention and working memory. Language, processing speed, and aspects of visuospatial functioning were largely consistent with premorbid estimates, taking into  deficits. Comparisons to prior testing suggested declines across most areas that were compared. Testing did not strongly indicate a pattern that would suggest Alzheimer's dementia, as recognition memory and language were intact. Taken together, reports of cognitive, neuropsychiatric, physical, and functional change suggest that a diagnosis of dementia with Lewy bodies should be considered in the differential. Although her symptom picture and test patterns are not entirely consistent, the constellation of symptoms is most suggestive of this etiology at a mild to moderate level, with behavioral disturbance. Visuospatial functioning was also largely within normal limits, with relative reduction on a complex figure drawing task    Neurocognitive Evaluation completed on 08/09/2022:  Neuropsychiatric/Behavioral Focused Evaluation Assessment   BEHAV5+ 5/6 See ROS section for a full description   Laboratories:     Lab Date Value [Reference]   Autoimmune/Paraneoplastic Screening           GIANNI Screen 2021, Sep-20  2021, Sep-20  2021, Sep-20    None Detected [None Detected]  None Detected [None Detected]  None Detected [None Detected]      Sed Rate 2021, Sep-20  2021, Sep-20  2021, Sep-20    7 [0 - 29 mm/Hr]  7 [0 - 29 mm/Hr]  7 [0 - 29 mm/Hr]      Metabolic Screening   Hemoglobin A1C External 09/20/2021  5.2 [4.0 - 5.6 %]  5.2 [4.0 - 5.6 %]  5.2 [4.0 - 5.6 %]      Homocysteine 06/13/2020  5.0 [4.0 - 15.5 umol/L]  5.0 [4.0 - 15.5 umol/L]  5.0 [4.0 - 15.5 umol/L]      TSH 04/18/20202020, Apr-18    1.500 [0.40 - 4.00 uIU/mL]  1.760 [0.40 - 4.00 uIU/mL]      Glucose 2021, Apr-28    105 [70 -  110 mg/dL]      Albumin 2021, Apr-28 2021, Apr-28 2021, Jan-27    4.2 [3.5 - 5.2 g/dL]  4.2 [3.5 - 5.2 g/dL]  4.6 [3.5 - 5.2 g/dL]      Alkaline Phosphatase 2021, Apr-28 2021, Apr-28 2021, Jan-27    108 [38 - 145 U/L]  108 [38 - 145 U/L]  106 [38 - 145 U/L]      ALT 2021, Apr-28 2021, Apr-28 2021, Jan-27    25 [0 - 35 U/L]  25 [0 - 35 U/L]  26 [0 - 35 U/L]      AST 2021, Apr-28 2021, Apr-28 2021, Jan-27    32 [14 - 36 U/L]  32 [14 - 36 U/L]  37 (H) [14 - 36 U/L]      BILIRUBIN TOTAL 2021, Apr-28 2021, Apr-28 2021, Jan-27    0.9 [0.2 - 1.3 mg/dL]  0.9 [0.2 - 1.3 mg/dL]  0.9 [0.2 - 1.3 mg/dL]      PROTEIN TOTAL 2021, Apr-28 2021, Apr-28 2021, Jan-27    7.0 [6.0 - 8.4 g/dL]  7.0 [6.0 - 8.4 g/dL]  7.7 [6.0 - 8.4 g/dL]      Cholesterol 2021, Oct-28    177 [120 - 199 mg/dL]      HDL 2021, Oct-28    51 [40 - 75 mg/dL]      Non-HDL Cholesterol 2021, Oct-28    126 [mg/dL]      Triglycerides 04/18/2020  281 (H) [30 - 150 mg/dL]      Folate 04/18/2020  >20.0 [4.0 - 24.0 ng/mL]      Thiamine 2020, Apr-18    142 [70 - 180 nmol/L]      Vit D, 25-Hydroxy 04/18/2020  84 [30 - 96 ng/mL]  84 [30 - 96 ng/mL]  84 [30 - 96 ng/mL]      Vitamin B-12 2020, Apr-18    798 [210 - 950 pg/mL]      Vitamin B2 2020, Apr-18    15 [5 - 50 nmol/L]      Vitamin B6 2020, Jun-13 2020, Apr-18    397.3 (H) [20.0 - 125.0 nmol/L]  300.6 (H) [20.0 - 125.0 nmol/L]      Infectious Disease/Immunocompromised Screening   SARS-CoV-2 RNA, Amplification, Qual 2020, Nov-02    Negative      Standard Hematology Screen   Hematocrit 2021, Jan-27    44.4 [37.0 - 48.5 %]      Hemoglobin 2021, Jan-27    14.9 [12.0 - 16.0 g/dL]      MCV 2021, Jan-27    88 [82 - 98 fL]      Platelets 2021, Jan-27    261 [150 - 350 K/uL]      Neuroendocrine/Electrolyte Screening   BUN 2021, Apr-28    20 (H) [7 - 18 mg/dL]      Chloride 2021, Apr-28    106 [95 - 110 mmol/L]      Creatinine 2021, Apr-28    0.84 [0.5 - 1.4 mg/dL]      Potassium 2021, Apr-28    4.6 [3.5 - 5.1  mmol/L]      Sodium 2021, Apr-28    142 [136 - 145 mmol/L]      Delirium Screening   Bacteria, UA 2021, Dec-10    Few (A) [Negative /hpf]      Glucose, UA 2021, Dec-10    Negative      Ketones, UA 2021, Dec-10    Negative      Leukocytes, UA 2021, Dec-10    Negative      NITRITE UA 2021, Dec-10    Negative      Protein, UA 2021, Dec-10    Negative      RBC, UA 2021, Dec-10    0 [0 - 4 /hpf]      WBC, UA 2021, Dec-10    1 [0 - 5 /hpf]           Neuroimaging:    Brain Fluorodeoxyglucose-positron emission tomography on 8/20/21   Technique: Following injection of 11.1 mCi of F18 FDG, tomographic emission images were acquired of the brain with a dedicated full ring PET/CT fusion scanner. Non-contrast enhanced CT images were obtained as a transmission scan and for anatomic localization. Blood glucose level: 94 mg/dl. Uptake time: 1 hour 3 minutes. As per PERCIST criteria, SUVlbw is reported. An individualized dose optimization technique, automated exposure control, was utilized for the performed procedure.   Formal interpretation by Radiology:   Hypometabolism predominantly within the frontal lobes, and to a lesser degree within the left temporal and parietal lobes. 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   Independently reviewed radiological imaging by Phill Pablo MD. MPH. Behavioral Neurologist   Impression: : Though there is visual evidence of decreased metabolism in the left frontal region, there is largely stable asymmetry of the high dorsal left prefrontal region since 2015 which will accordingly skew the relative illuminance of the region    Brain Dopamine transporter SPECT imaging on 8/27/2021   Technique: Following administration of thyroid blocking agent, patient received intravenous dose of 6.95 mCi of I123 Ioflupane. After 3 hour delay, SPECT imaging of the brain is performed.   Formal interpretation by Radiology:   Activity is absent within the left putamen and caudate nucleus. There is  also diminished activity in the right putamen. IMPRESSION: ABNORMAL EXAMINATION WITH EVIDENCE FOR STRIATAL DOPAMINERGIC NEURODEGENERATION.   Independently reviewed radiological imaging by Phill Pablo MD. MPH. Behavioral Neurologist   Impression: : there is clear evidence of asymmetric left greater than right decreased dopaminergic reuptake strongly suggesting at minimum degeneration of the dopaminergic network    MRI brain/head with and without contrast on 5/22/2018   Technique: Multiplanar, multisequence pre and post-contrast MRI of the brain and IAC's was performed.   Comparison: None   Formal interpretation by Radiology:   Age-appropriate generalized involutional changes are seen along with findings most compatible with mild chronic microvascular ischemic changes.   Independently reviewed radiological imaging by Phill Pablo MD. MPH. Behavioral Neurologist   T1: No significant cortical atrophy pattern further testing to be a mild asymmetry in the left greater than right dorsal lateral prefrontal cortex   T2/FLAIR: scattered punctuated subcortical hyperintensity lesions consistent with microvascular ischemic changes were right greater than left   DWI/ADC: No Significant DWI hyperintensities/hypointensities. No ADC correlation.   SWI/GRE: No Significant hypointensities to suggest cortical/subcortical hemosiderin deposition.   Impression: : largely normal MRI brain with scattered microvascular changes most predominantly in the right basal ganglia    MRI brain/head without contrast on 1/27/21   Technique: Multiplanar multisequence MR imaging of the brain was performed without the administration of intravenous contrast.   Comparison: CT head 01/27/2021, MRI brain 05/29/2018   Formal interpretation by Radiology:   Age-appropriate generalized cerebral volume loss with findings suggestive of mild chronic microvascular ischemic change.   Independently reviewed radiological imaging by Phill  MD Bev. MPH. Behavioral Neurologist   T1: Generalized cortical atrophy; mild left greater than right frontal greater than posterior. small cavum septum pellucidum. very mild right greater than left hippocampal atrophy. collectively atrophy pattern seems to be slightly increased compared to 2018   T2/FLAIR: mild bilateral scattered punctuated lesions consistent with hypertensive microvascular ischemic changes. Mild periventricular capping right greater than left. Two areas of focal cavitation in the corona radiata adjacent to the right basal ganglia.   DWI/ADC: No Significant DWI hyperintensities/hypointensities. No ADC correlation.   SWI/GRE: No Significant hypointensities to suggest cortical/subcortical hemosiderin deposition.   Impression: : mild generalized cortical atrophy slightly pronounced in the dorsal lateral prefrontal cortex with some scattered right greater than left subcortical microvascular changes consistent with hypertensive strokes. microvascular changes seem to be largely stable compared to 2018 however atrophy pattern seems to be slightly pronounced.     Procedures:    Electroencephalogram on 1/27/2021   Formal interpretation:   This is a normal EEG in an awake and drowsy adult. No potentially epileptiform activity was seen. Please be aware that a normal EEG does not exclude the possibility of an underlying seizure disorder.   Independently reviewed Electroencephalogram by Phill Pablo MD. MPH. Behavioral Neurologist   Impression: : normal    Electrocardiogram on 1/27/21   Formal interpretation:   Vent. Rate : 048 BPM     Atrial Rate : 048 BPM    P-R Int : 182 ms          QRS Dur : 080 ms     QT Int : 450 ms       P-R-T Axes : 049 -21 024 degrees    QTc Int : 402 ms Sinus bradycardia Anterolateral infarct ,age undetermined Abnormal ECG   Independently reviewed Electrocardiogram by Phill Pablo MD. MPH. Behavioral Neurologist   Impression: : Received ECG has mild  evidence of sinus node disease. HR (>=50-60) 48 BPM. Prolonged MA interval (>0.22 s). Broad QRS complex (> 0.12 s).    Echocardiography on 2/5/21   Formal interpretation:   Concentric hypertrophy and normal systolic function. The estimated ejection fraction is 60% Grade I left ventricular diastolic dysfunction. Normal right ventricular size with normal right ventricular systolic function. Mild tricuspid regurgitation. Normal central venous pressure (3 mmHg). The estimated PA systolic pressure is 29 mmHg.   Independently reviewed Echocardiography by Phill Pablo MD. MPH. Behavioral Neurologist   Impression: : Normal     Clinical Summary:     Ms. Garcia is a 76-year-old right-handed female with a relevant past medical history of Metabolic syndrome ( hypertension, hyperlipidemia, obesity), migraine headaches, MDD, ERENDIRA on CPAP, who presents reporting a 7-year history of gradually progressive neurocognitive impairment.       The clinical history is suggestive of:   Memory Impairment: STM encoding impairment, LTM encoding-retrieval impairment, Amnesia of fixation   Attention Impairment: Attention, Selective attention, Sustained attention, Shifting attention   Executive Impairment: Energization, Working Memory, Set-Shifting, Response Inhibition   Visuospatial Impairment: Allocentric Spatial Processing   Motor/Coordination Impairment: Sensory motor integration   Sensory Impairment: Limbic Dysfunction   Behavior Impairment: Neurovegetative, Emotional Regulation, Self-Preservation Dysregulation, Social Coherence, Sensorimotor Dysregulation, Stimulation Dysregulation   Psychiatric Impairment: Signal-Noise Dysregulation, Mood Regulation, Fixed-False Beliefs, Hallucinations, Neuroleptic Sensitivity   Medical Review of Systems Impairment: Autonomic Dysfunction   iADL Impairment: Cesar Instrumental Activities of Daily Living Scale  The neurological examination is significant for:   Cortical Frontal-Parietal  Disconnection: apraxia (limb-motor)   Cortical Transcallosal Disconnection: interhemispheric motor control (interhemispheric motor control ), motor efference (motor overflow, alien limb)   Executive Impairment: judgment, dysexecutive behavior (perseverative/stereotyped)   Movement Disorder (Hyperkinetic): dyskinetic movements, akathisia, tardive dyskinesia   Movement Disorder (Speech): abnormal vocal features (volume, rate)  Informal neuropsychology battery is positive (based on age and education) for:   Executive predominant multidomain major cognitive impairment    BEHAV5+ 5/6: See ROS section for a full description  Neurological imaging   Brain Fluorodeoxyglucose-positron emission tomography (8/20/21): Though there is visual evidence of decreased metabolism in the left frontal region, there is largely stable asymmetry of the high dorsal left prefrontal region since 2015 which will accordingly skew the relative illuminance of the region   Brain Dopamine transporter SPECT imaging (8/27/2021): there is clear evidence of asymmetric left greater than right decreased dopaminergic reuptake strongly suggesting at minimum degeneration of the dopaminergic network   MRI brain/head with and without contrast (5/22/2018): largely normal MRI brain with scattered microvascular changes most predominantly in the right basal ganglia   MRI brain/head without contrast (1/27/21): mild generalized cortical atrophy slightly pronounced in the dorsal lateral prefrontal cortex with some scattered right greater than left subcortical microvascular changes consistent with hypertensive strokes. microvascular changes seem to be largely stable compared to 2018 however atrophy pattern seems to be slightly pronounced.        Assessment:        Ms. Garcia's clinical presentation is behavior/psychiatric predominant major cognitive impairment (mild dementia) sufficient to impair activities of daily living (CDR-SOB: 4.5 , [place] iADL:  8/8 - Mild Dementia).    Ms. Garcia's clinical presentation meets the criteria for Dementia (McYasmanyann, et al. 2011 Alzheimer's & Dementia).      Concern regarding an intraindividual change in cognition diagnosed through a combination of history and objective cognitive assessment   Impairment in two or more cognitive domains   Interference with independence in functional abilities.   Represents a decline from previous levels of functioning.   Not explained by delirium or major psychiatric disorder       Ms. Emerson clinical syndrome is unclear and has features of multiple syndromes. Ms. 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 Brain 2011) and has features of possible Dementia with Lewy Bodies (DLB) (Psacual et al. Neurology 2005). 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      The pathology underlying Ms. Emerson cognitive impairment is unclear. Possibilities include a mixed Lewy body disease/alpha-synucleinopathy and and dorsal prefrontal ADRP. Frontotemporal Dementia with Parkinsonism-17/Three-Repeat Tauopathy remains a possibility however the lack of parkinsonism on examination. 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 As such this is speculation at this time. Known Genetics: Negative for [employer] testing of 33 genes selected ALS2 ANG ANXA11 DAMARIS CHCHD10 CHMP2B DCTN1 ERBB4 FUS GRN HEXA [id number] ITM2B KIF5A MAPT OPTN PFN1 PRNP PSEN1 PSEN2 SETX SNCA SOD1 SORL1 SPG11 SQSTM1 TARDBP TBK1 TFG TREM2  UBQLN2 VAPB VCP.      At the observations and made above were discussed with the patient and her family. Following a recent ground level fall with head trauma patient's behavior and sleep have worsened. Previously the patient has mood lability and outbursts were modestly managed on Lamictal 25 mg BID. Currently the patient is behavioral outbursts have worsened. Patient's recent ground level fall seems to been provoked by orthostatic hypotension for which the patient is taking midodrine 5 mg t. I. D. Recommend increase to 10 mg t. I. D. Slowly over 3 weeks. Regarding patient's insomnia recommend switching from melatonin to Belsomra 10 mg. For patient's worsening excoriation disorder, recommend starting NAC 1200mg and titrate slowly to 3000mg daily. The next appointment will consider increasing Lamictal for excoriations disorder and mood disorder. 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 We provided reading material.     Suvorexant (as well as other orexin inhibitors) is extremely effective, well-tolerated in those over 65 with minimal adverse reactions in clinical trials, and non-addictive. In clinical trials it was shown to improve total sleep time, sleep latency, waking after sleep onset, and quality of sleep, and the only adverse reaction that clearly  from placebo was morning grogginess (Suvorexant 7%, placebo 3%). If insurance requires a prior authorization, guidelines established by the American Academy of Sleep Medicine (AASM) should be sufficient justification for using Suvorexant. Per the AASM guidelines (Angella et al, J Clin Sleep Med, 2017;13(2):307-349), Suvorexant is the most reasonable choice for the treatment of the insomnia in those over 65, particularly in the setting of cognitive impairment. Benzodiazepines (e. G. Triazolam, Temazepam), Z drugs (e. G. Zolpidem,  Eszopiclone), and anticholinergic medications (e. G. Doxepin) are not recommended to treat insomnia in those over 65 and are listed on the AGS Beers Criteria (AGS Beers Criteria Update Expert Panel, J Am Geriatr Soc, 2019;67(4):674-694). These medications increase the risk for falls and worsen cognition. Other medications, including Zaleplon and Ramelteon are only indicated for sleep-onset insomnia. Trazodone is not recommended by AASM for the treatment of insomnia.        Care Management Plan:     #Insomnia   Start Belsomra 10 mg    Stop melatonin    Continue Gabapentin 100mg HS   We recommend compliance with CPAP   Next appt will consider low-dose clonazepam for bruxism and RBD  #Neurocognitive Disorder Treatment:   Next appt will consider increasing Lamictal to 25 mg BID   Continue Donepezil 10 mg q.day   Continue Vitamin D3 50,000IU once per month   We have discussed the Syn-One test  #Exocriation disorder:   Start n-acetylcysteine 1200mg and titrate to goal 3000mg/daily (Maurice et al., 2016)  #Orthostatic hypotension:   Increase midodrine to 10mg TID slowly over 3 weeks  #Microvascular Disease Management:   Continue rosuvastatin 20mg   Continue aspirin 81 mg  #Behavioral/Environmental Treatment   We recommend engaging in activities that stimulate cognitively and socially while avoiding excessive stimulation and fatigue in overwhelmingly complex situations.   We recommend integrating routine and schedule into your daily life. https://www.alzheimersproject.org/news/the-importance-of-routine-and-familiarity-to-persons-with-dementia/  #Health Maintenance/Lifestyle Advice   We have discussed the value in aggressively controlling vascular risk factors like hypertension, hyperlipidemia, and Diabetes SBP<130, LDL<100, A1C<7.0.   We discussed the need to optimize lifestyle choices including a heart-healthy diet (e.g., Mediterranean or DASH), increased cardiovascular exercise (goal 150 minutes of  moderate-intensity per week), and stay cognitively and socially active.  #Support   We all need support sometimes. Get easy access to local resources, community programs, and services. https://www.communityresourcefinder.org/   Learn more about Cognitive Impairment in [place]: https://www.alz.org/professionals/public-health/state-overview[place]   Learn more about your local community's dementia and neurocognitive impairment resources in [place]: https://www.alz.org/[place]/helping_you   Learn more about your local support for dementia: https://www.alz.org/[place]/helping_you/support   Learn more about housing support for people living with dementia in [place]: https://www.memoryMedic Trace.PerkHub/memory-care-in-[place]/   Learn more about financial support for people living with dementia in [place]: https://www.[employer]kHub/financial-assistance/   Learn more about caregiver support with the Family Caregiver [employer]: http://www.caregiver.org  #Safety   Individuals living with Alzheimer's disease and other dementias are at increased risk for injury or harm in certain areas of the home. As the disease progresses, they may become unaware of the dangers that exist. Consider taking the following precautions to create a safe environment that can prevent dangerous situations from occurring and help maximize independence for as long as possible. https://www.alz.org/help-support/resources/home_safety_checklist   [place] has no laws against driving with dementia specifically but obviously has laws about medical conditions which impact a person's ability to drive safely. If you believe your loved one's driving capacity has diminished, please reach out to either your primary care physician or our office to discuss driving restrictions or revocation of their license. To learn more: https://www.dementiacarecentral.com/caregiverinfo/driving-problems/   The [employer] administers the  "nationwide "Safe Return" program with identification bracelets, necklaces, or clothing tags and 24-hour assistance. More information is available online at https://www.alz.org/help-support/caregiving/safety/medicalert-with-24-7-wandering-support  #Follow up:   Follow-up in 2 weeks (Aug 2022).    Thank you for allowing us to participate in the care of your patient. Please do not hesitate to contact us with any questions or concerns.     It was a pleasure seeing Ms. Garcia and we look forward to seeing them at their follow-up visit.     This note is dictated on M*Modal Fluency Direct word recognition program. There are word recognition mistakes that are occasionally missed on review.      Scheduled Follow-up :  Future Appointments   Date Time Provider Department Center   8/10/2022  1:30 PM BRENDEN Bailey Jr., MD [facility] [place]   12/5/2022  2:40 PM [facility] DEXA 1 LIMIT 350 LBS [facility]       After Visit Medication List :     Medication List          Accurate as of August 9, 2022 11:43 AM. If you have any questions, ask your nurse or doctor.            CONTINUE taking these medications    cholecalciferol (vitamin D3) 1,250 mcg (50,000 unit) capsule     donepeziL 10 MG tablet  Commonly known as: ARICEPT  TAKE 1 TABLET BY MOUTH EVERY DAY IN THE EVENING     ergocalciferol 50,000 unit Cap  Commonly known as: ERGOCALCIFEROL     gabapentin 100 MG capsule  Commonly known as: NEURONTIN  Take 1 capsule (100 mg total) by mouth every evening.     GAS-X MAXIMUM STRENGTH ORAL     lamoTRIgine 25 MG tablet  Commonly known as: LAMICTAL  Take 1 tablet (25 mg total) by mouth 2 (two) times daily.     lubiprostone 8 MCG Cap  Commonly known as: AMITIZA  Take 1 capsule (8 mcg total) by mouth 2 (two) times daily with meals.     midodrine 5 MG Tab  Commonly known as: PROAMATINE  Take 1 tablet (5 mg total) by mouth 3 (three) times daily with meals.     rosuvastatin 20 MG tablet  Commonly known as: CRESTOR  Take 1 tablet " (20 mg total) by mouth once daily.     UNABLE TO FIND     UNABLE TO FIND     UNABLE TO FIND     UNABLE TO FIND     UNABLE TO FIND            Signing Physician:  Phill Villanueva MD    Billing:    -----------------------------------------------------------------------------    I spent a total of 40 minutes (time-in: 11:40 AM; time-out: 12:20 PM) on 08/09/2022, in-person face-to-face with the patient and caregiver(s), >50% of that time was spent counseling regarding the symptoms, treatment plan, risks, therapeutic options, lifestyle modifications, and/or safety issues for the diagnoses above.    10/14 Review of Systems completed and is negative except as stated above in HPI (Systems reviewed: Const, Eyes, ENT, Resp, CV, GI, , MSK, Skin, Neuro)    I independently reviewed radiological imaging, specimen, and tracing for a total of 10 minutes on 08/09/2022. This is directly related to the face-to-face encounter. Independent review of radiological imaging, specimen, and tracing was noted to be within normal limits except as stated above in HPI    I reviewed the summation of records from outside physicians for a total of 5 minutes on 08/09/2022. Reviewed and summation of records from an outside physician was performed as summarized above in HPI    I performed a neurobehavioral status examination that included a clinical assessment of thinking, reasoning, and judgment. Please see above HPI and ROS for full details. This exam was performed on 08/09/2022 and included 14 minutes spent on direct face-to-face clinical observation and interview with the patient and 19 minutes spent interpreting test results and preparing the report. The total time of 33 minutes spent on the neurobehavioral status examination is not included in the time spent on evaluation and management coding.    Total Billing time spent on encounter/documentation for this patient's evaluation and management, not including the neurobehavioral status examination:  41 minutes.

## 2022-08-11 ENCOUNTER — PATIENT MESSAGE (OUTPATIENT)
Dept: NEUROLOGY | Facility: CLINIC | Age: 77
End: 2022-08-11
Payer: MEDICARE

## 2022-08-11 DIAGNOSIS — F42.4 EXCORIATION (SKIN-PICKING) DISORDER: ICD-10-CM

## 2022-08-15 ENCOUNTER — TELEPHONE (OUTPATIENT)
Dept: NEUROLOGY | Facility: CLINIC | Age: 77
End: 2022-08-15
Payer: MEDICARE

## 2022-08-15 NOTE — TELEPHONE ENCOUNTER
----- Message from Phill Villanueva MD sent at 2022  2:10 PM CDT -----  Marin Fofana,    This patient already had [facility] genetic testing but they did not get the full panel. Can we repeat the order but with the following tests?     Indications:  - Diagnosis: FTLD with parkinsonism    Testing requested:  - [facility] Hereditary Parkinson Disease and Parkinsonism Panel Test code: 62660  - Also I dont want to repeat [facility] Hereditary Amyotrophic Lateral Sclerosis, Frontotemporal Dementia and Alzheimer Disease Panel Test code: 66001, but I want to include the add-on genes Add-on panel for Hereditary Amyotrophic Lateral Sclerosis, Frontotemporal Dementia and Alzheimer Disease 9 genes selected  [id number]  DDHD1  ERLIN1  FIG4  LRRK2  MATR3  NEFH  SIGMAR1  TIA1  - [facility] [id number] Frontotemporal Dementia and Amyotrophic Lateral Sclerosis Repeat Expansion Test Test code: 038145    This should be an option. Can you confirm that it is possible to do this combination?    Kind Regards,  Phill

## 2022-08-15 NOTE — TELEPHONE ENCOUNTER
2 test kits required for all of the diagnostics ordered.    REQUISITION  [id number]  ORDER DATE  8/15/2022  TEST TYPE  Diagnostic  PATIENT  Christine Garcia    1945  Estimated Report Date:  Pending   Order submitted  ORDER SUBMITTED  8/15/2022  SPECIMEN RECEIVED  LAB PROCESSING  ANALYSIS AND INTERPRETATION  REPORT RELEASED    REQUISITION  [id number]  ORDER DATE  8/15/2022  TEST TYPE  Diagnostic  PATIENT  Christine Garcia    1945  Estimated Report Date:  Pending   Order submitted  ORDER SUBMITTED  8/15/2022  SPECIMEN RECEIVED  LAB PROCESSING  ANALYSIS AND INTERPRETATION  REPORT RELEASED

## 2022-08-22 ENCOUNTER — PATIENT MESSAGE (OUTPATIENT)
Dept: NEUROLOGY | Facility: CLINIC | Age: 77
End: 2022-08-22
Payer: MEDICARE

## 2022-08-30 ENCOUNTER — PATIENT MESSAGE (OUTPATIENT)
Dept: NEUROLOGY | Facility: CLINIC | Age: 77
End: 2022-08-30

## 2022-08-30 ENCOUNTER — OFFICE VISIT (OUTPATIENT)
Dept: NEUROLOGY | Facility: CLINIC | Age: 77
End: 2022-08-30
Payer: MEDICARE

## 2022-08-30 ENCOUNTER — PATIENT MESSAGE (OUTPATIENT)
Dept: NEUROLOGY | Facility: CLINIC | Age: 77
End: 2022-08-30
Payer: MEDICARE

## 2022-08-30 DIAGNOSIS — F45.8 BRUXISM: ICD-10-CM

## 2022-08-30 DIAGNOSIS — F60.9 PERSONALITY DISORDER: ICD-10-CM

## 2022-08-30 DIAGNOSIS — R45.86 MOOD SWINGS: ICD-10-CM

## 2022-08-30 DIAGNOSIS — G31.09 FRONTOTEMPORAL LOBAR DEGENERATION: Primary | ICD-10-CM

## 2022-08-30 DIAGNOSIS — G47.01 INSOMNIA DUE TO MEDICAL CONDITION: ICD-10-CM

## 2022-08-30 DIAGNOSIS — G25.71 AKATHISIA: ICD-10-CM

## 2022-08-30 DIAGNOSIS — F02.80 FRONTOTEMPORAL LOBAR DEGENERATION: Primary | ICD-10-CM

## 2022-08-30 DIAGNOSIS — R44.3 HALLUCINATIONS: ICD-10-CM

## 2022-08-30 DIAGNOSIS — G20.C PARKINSONISM, UNSPECIFIED PARKINSONISM TYPE: ICD-10-CM

## 2022-08-30 PROCEDURE — 99215 OFFICE O/P EST HI 40 MIN: CPT | Mod: 95,,, | Performed by: PSYCHIATRY & NEUROLOGY

## 2022-08-30 PROCEDURE — 99215 PR OFFICE/OUTPT VISIT, EST, LEVL V, 40-54 MIN: ICD-10-PCS | Mod: 95,,, | Performed by: PSYCHIATRY & NEUROLOGY

## 2022-08-30 RX ORDER — LAMOTRIGINE 25 MG/1
TABLET ORAL
Qty: 120 TABLET | Refills: 1 | Status: SHIPPED | OUTPATIENT
Start: 2022-08-30 | End: 2022-11-01

## 2022-08-30 RX ORDER — SUVOREXANT 20 MG/1
1 TABLET, FILM COATED ORAL NIGHTLY
Qty: 30 TABLET | Refills: 3 | Status: SHIPPED | OUTPATIENT
Start: 2022-08-30 | End: 2023-01-23 | Stop reason: SDUPTHER

## 2022-08-30 NOTE — PROGRESS NOTES
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 discussed medication options in the past.   Recommended trial of low-dose Lamictal 25 mg. Side effects have been discussed with her family.   her family is advised to watch for rashes.   Will repeat LFTs and CBC at 1 and 3 months. Additional things discussed today include genetic testing.   her family would like to pursue genetic testing at this time.   We will screen for standard causes of FTLD with [employer] neurodegenerative panel.  Primary Care Provider (02/08/2022)  Type: Chart Review. Christine Garcia is a 76 y. O. Female with history of atrial septal aneurysm, thoracic atherosclerosis, hypercholesterolemia, depression, dementia, relative bradycardia, vasodepressive type of vasovagal.  Specialist: cardiology (03/22/2022)  Type: Chart Review. Christine Garcia is a 76 y. O. Female with history of atrial septal aneurysm, thoracic atherosclerosis, hypercholesterolemia, depression, dementia, relative bradycardia, vasodepressive type of vasovagal.  Hospitalization (06/25/2022)  Type: Chart Review. Last time seen, the patient was started on low-dose Lamictal. Gabapentin was continued 100 mg. Belsomra does not appear  to have been started. Donepezil was continued 10 mg q. Day. Since starting Lamictal her family report improvement in cycling behavior. Though there has been a recent up taking last 1-2 weeks of anxiety her daughter reports general improvement in her cyclothymic tendencies with variable highs of panic/ anxiety and confusion. Recommend increasing 25 mg once a day to twice a day following liver function tests which will be completed next 2-3 days. Other issues addressed today include longstanding insomnia. The patient is responsive to 4 mg melatonin extended release. her family does report however in recent weeks the patient has been exhibiting more REM sleep behavioral disorder. The patient has a long history of bruxism for which she is in the process of getting a mouth guard. However recently fit the patient has been noted to be talking her sleep more often. her family report minimum 1 episode in recent past where she woke up in a frantic state. her  thinks she was still sleep however was actively in distress and thought she saw things on the ground. Is not entirely Seroquel she is certain whether not these were hallucinations or REM sleep behavior disorder however her  who was present thinks she was still asleep. Review of previous medical records indicate the patient has been recently seen by Cardiology. The patient has a prior diagnosis of orthostatic hypotension. The patient is currently treated on midodrine. The patient there are no current side effects on midodrine. Otherwise the patient has improved compared to last time seen. Cycling behavior has not fully improved but however is going in the right direction. Sleep seems to be under control aside from bothersome REM sleep behavioral disorder. We discussed potential medications for controlling REM sleep behavior disorder which include clonazepam however given prior medication sensitivities recommend increasing Lamictal 1st before considering the  addition of benzodiazepines. As patient's sleep is currently under control do not recommend switching the patient from melatonin to Belsomra at this time. Ramelteon was not covered by insurance. At the observations and made above were discussed with the patient and her family. 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 The patient's clinical syndrome was initially masked by polypharmacy as we recommend monitoring for symptoms mentioned above. Now that we have tapered off previous medications, her clinical presentation is becoming more clear. Patient's presentation is becoming to look more akin to Lewy body dementia   Given neuroleptic sensitivity, REM sleep behavioral disorder, personal familial history of mood disorder, significant fluctuations in arousal/mood  however at this time the patient does not have overt parkinsonism nor vivid well-formed hallucinations. Recommend increasing Lamictal from 25 mg to 25 mg b. I. D. Following LFTs. Continue melatonin 4 mg. If needed will attempt Belsomra. Recommend avoiding anticholinergics and antihistamines. Repeat LFT/ CMP for increasing Lamictal. The patient continues to have bothersome worsening RBD and bruxism will consider the addition of clonazepam. Type: Chart Review. 76-year-old female with history of hypertension, hyperlipidemia, frontotemporal dementia presents to the emergency department for reported syncopal episode. her  reports he could see the patient standing at the bathroom sink then when he looked away he heard the patient fall, but did not witness it. Reports that she fell backwards and was unresponsive momentarily after the fall. No seizure-like activity that was witnessed. The patient sustained a wound to the back of her head, lower lip into her chin and broke her left upper veneer. The patient reports no significant pain at this time. She is not anticoagulated. Last tetanus  vaccine 2014.  [facility] - Phill Villanueva MD. Neurologist (08/09/2022)  Type: Chart Review. 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 On 06/25/2022 the patient had a syncopal episode likely secondary to orthostatic hypotension while standing at the bathroom sink. The patient fell backwards hitting her face break your teeth on the sink becoming unresponsive for a moment. CT head showed no acute intracranial trauma. Regardless following this event her family believes there has been a decompensation. As primary historian, the patient's daughter, has been away on vacation for the last month is unclear when patient's Behavior in sleep began to decompensate. Since last time seen patient's sleep has become more fragmented. She is frequently having REM sleep behavior disorder but frequently talking acting out dreams. She reports increasing daytime fatigue, worsening comprehension/confusion, worsening excoriation disorder, in increasing mood swings. We discussed medication alternatives for various issues. Recommend focusing on sleep by increasing melatonin to Belsomra. Recommend low risk strategies such as increasing and acetylcysteine for excoriation disorder. Given patient's worsening lightheadedness responsive to midodrine recommend increasing from 5 mg to 10 mg b. I. D. Over 3 weeks. We discussed patient's clinical presentation B-mode were consistent with Lewy body dementia at this time however frontal temporal atrophy still remains atypical for syndrome. We have discussed biomarker testing in the form of the Syn-One skin biopsy for alpha-syn. her family is interested. We we will provide reading material. At the observations and made above were discussed with the patient and her family. Following a recent ground level fall with head trauma patient's behavior and sleep have worsened. Previously the patient has mood lability and outbursts were modestly managed  on Lamictal 25 mg BID. Currently the patient is behavioral outbursts have worsened. Patient's recent ground level fall seems to been provoked by orthostatic hypotension for which the patient is taking midodrine 5 mg t. I. D. Recommend increase to 10 mg t. I. D. Slowly over 3 weeks. Regarding patient's insomnia recommend switching from melatonin to Belsomra 10 mg. For patient's worsening excoriation disorder, recommend starting NAC 1200mg and titrate slowly to 3000mg daily. The next appointment will consider increasing Lamictal for excoriations disorder and mood disorder. 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 We provided reading material.  [facility] - Phill Villanueva MD. Neurologist (08/23/2022)  Type: Chart Review. Last time seen patient's midodrine was increased to 10 mg b. I. D. Over 3 weeks. Offered Syn-One skin biopsy for alpha-syn and expanded genetic testing panel. Started and acetylcysteine and titrated up to 3000 mg. Started Belsomra 10 mg. During this appt discussed potentially increasing Lamictal. At the observations and made above were discussed with the patient and her family. Following a recent ground level fall with head trauma patient's behavior and sleep have worsened. Previously the patient has mood lability and outbursts were modestly managed on Lamictal 25 mg BID. Currently the patient is behavioral outbursts have worsened. Patient's recent ground level fall seems to been provoked by orthostatic hypotension for which the patient is taking midodrine 5 mg t. I. D. Recommend increase to 10 mg t. I. D. Slowly over 3 weeks. Regarding patient's insomnia recommend switching from melatonin to Belsomra 10 mg. For patient's worsening excoriation disorder, recommend starting NAC 1200mg and titrate slowly to 3000mg daily. The next appointment will consider increasing  Lamictal for excoriations disorder and mood disorder. 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 We provided reading material. Suvorexant (as well as other orexin inhibitors) is extremely effective, well-tolerated in those over 65 with minimal adverse reactions in clinical trials, and non-addictive. In clinical trials it was shown to improve total sleep time, sleep latency, waking after sleep onset, and quality of sleep, and the only adverse reaction that clearly  from placebo was morning grogginess (Suvorexant 7%, placebo 3%). If insurance requires a prior authorization, guidelines established by the American Academy of Sleep Medicine (AASM) should be sufficient justification for using Suvorexant. Per the AASM guidelines (Angella et al, J Clin Sleep Med, 2017;13(2):307-349), Suvorexant is the most reasonable choice for the treatment of the insomnia in those over 65, particularly in the setting of cognitive impairment. Benzodiazepines (e. G. Triazolam, Temazepam), Z drugs (e. G. Zolpidem, Eszopiclone), and anticholinergic medications (e. G. Doxepin) are not recommended to treat insomnia in those over 65 and are listed on the AGS Beers Criteria (AGS Beers Criteria Update Expert Panel, J Am Geriatr Soc, 2019;67(4):674-694). These medications increase the risk for falls and worsen cognition. Other medications, including Zaleplon and Ramelteon are only indicated for sleep-onset insomnia. Trazodone is not recommended by AASM for the treatment of insomnia.     Current Presentation  Recent/Interim History:  Last time seen patient's midodrine was increased to 10 mg b. I. D. Over 3 weeks. Offered Syn-One skin biopsy for alpha-syn and expanded genetic testing panel. Started and acetylcysteine and titrated up to 1000 mg. Started Belsomra 10 mg. During this appt discussed potentially increasing Lamictal.  Since starting Belsomra her family reported improvement in sleep however continues to have mild fragmentation of sleep that is somewhat bothersome. The patient has REM sleep behavior disorder seems to have improved with deeper sleep on Belsomra 10 mg. Recommend increasing to 20 mg per of her family his only titrated up and acetylcysteine to 1000 mg. Recommend increasing to 3000 mg over the next month before stopping medication entirely. In the last 3 weeks her family report return of patient's oral pharyngeal dyskinetic movements. There appear mild on presentation however are still present. The patient continues to have bother some mood fluctuations that are otherwise relatively stable. Given patient's increasing dyskinetic movements, skin picking, and mood swings recommend increasing Lamictal 25 mg b. I. D. To 50 mg b. I. D. Slowly over 1 month. If symptoms do not improve on increased dose of Lamictal may consider the addition of clonazepam for bruxism and REM sleep behavior disorder.  Unresolved Concern(s) reported by patient/family:  Insomnia - improving with Belsomra, complicated by CPAP noncompliance  Anxiety/cycling mood - improved on low dose lamictal  RBD/Bruxism - hesitant to start clonazepam however will consider if SXS get worse        Review of cognitive, visuospatial, motor, sensory, and behavioral systems:     Memory:   Ms. Garcia's memory has worsened in the past few years.  She does have difficulty remembering recent events.  She does have difficulty remembering recent important conversations.  She does repeat statements or asks the same question repeatedly.  She does forget information within minutes.  She does misplace personal items (e.g., keys, cell phone, wallet) more frequently.  She does have difficulty keeping track of her medications.  She does forget people's names more frequently.  Her remote memory is intact.  Attention:   She does become easily distracted.  Her attention and  concentration are impaired.  She does not have attentional fluctuations.  She does have difficulty with selective attention.  She does have difficulty with divided attention.  She does have difficulty with executive attention.  Executive:   Ms. Melaras cognitive processing speed is slower.  She does not have word-finding difficulties.  She does have difficulty with working memory.  She does not have difficulty with planning/organizing/completing multistep tasks.  She does not have difficulty with flexible thinking.  She does not have difficulty with response inhibition.  Her judgment is impaired.  Language:   Her speech output is not unaffected.  Ms. Melaras speech is fluent and non-effortful.  Ms. Emerson speech is grammatically intact.  She does not make word substitutions.  She does not have difficulty reading.  She does not appear to have impaired comprehension.  Visuospatial:   She does not have difficulty recognizing objects or faces.  Ms. Garcia does have visuospatial disorientation.  She does not get lost in familiar places.  She does have trouble navigating.  She has become confused or disoriented in *new*, unfamiliar places.  Motor/Coordination:   Ms. Garcia does not have difficulty with walking.  She does not feel imbalanced.  She denies having fallen.  She does not appear to have new muscle weakness.  She does have difficulty buttoning shirts, operating zippers, or manipulating tools/utensils.  Her handwriting has not become micrographic.  She does not have a resting tremor.  She denies having any new involuntary movements and/or muscle jerking.  She does not have swallowing difficulty.  She denies new muscle cramps and twitching.  Sensory:   Ms. Garcia denies new numbness, tingling, paresthesias, or pain.  Ms. Garcia denies a loss of vision, blurry vision, or double vision.  Ms. Garcia denies new loss of hearing or worsening tinnitus.  Sleep:   Ms. Garcia reports difficulty sleeping.  Comment: 4/2020: She sleeps well. 4/2021: She uses CPAP to treat obstructive sleep apnea (since fall 2020), but this is inconsistent. Her  said she has conversations in her sleep or will seem to be awake but still be sleeping. No significant motor movements in sleep.  Ms. Garcia does have difficulty going to sleep.  Ms. Garcia reports difficulty staying asleep and/or frequently awakening at night.  Ms. Garcia does snore and/or have witnessed apneas while sleeping. Comment: She uses CPAP to treat obstructive sleep apnea (since fall 2020), but this is inconsistent.  When she wakes up in the morning, she does feel well-rested.  She has been reported to have dream-enactment behavior. Comment: Her  said she has conversations in her sleep or will seem to be awake but still be sleeping. No significant motor movements in sleep.  She denies symptoms suggestive of restless leg syndrome.  Behavior:   Ms. Melaras personality has changed.  She does have symptoms of disinhibition and social inappropriateness.  She is exhibiting symptoms to suggest a loss of manners and/or decorum.  She is exhibiting new symptoms that suggest they have become more impulsive, rash and/or careless.  She does not have apathy and/or decreased motivation. Comment: 4/2020: Her daughter noted reduced engagement in pleasurable activities. 4/2021: Significant, per daughter. She mostly sits in her chair and stares. Ms. Garcia said she does needlepoint, but her daughter said this is not consistent. She was prescribed Ritalin.  She does appear to have had a change in behavioral/emotional inertia.  Ms. Melaras emotional expression has changed.  She does have emotional blunting or lability.  She has been reported to have new symptoms of agitation, aggression, or violent outbursts. Comment: 4/2020: Resistance to taking medication but will accept feedback and support from family without defensiveness. 4/2021: None reported.  She does have  symptoms of irritability and mood lability. Comment: 4/2020: Her daughter noted some irritability due to stress. 4/2021: Irritability with her  in the context of caregiving.  Her insight into his disease and situation is impaired.  Her personal hygiene is intact.  She is exhibiting a diminished response to other people's needs and feelings? Comment: patient has consistently demonstrated decrease in appetite and response to both her  and others. Patient has been hardly a necessarily mean to her  who has recently had a stroke and has become physically disabled following this.  She is exhibiting diminished social interest, interrelatedness, and/or personal warmth.  She does appear restless.  She denies new and/or worsening simple repetitive behaviors.  Her speech has not become simplified or become repetitive/stereotyped.  She reports symptoms that are suggestive of new/worsening complex repetitive/ritualistic compulsions and behaviors.  She does not have symptoms of hyper-religiosity or dogmatism.  Her interests/pleasures have not become restrictive, simplified, interrupting, or repetitive.  She is exhibiting symptoms to suggest an increase in self-stimulating behavior. Comment: 4/2021: Picking behaviors, which progressed beyond her face to her back and chest despite changes in medications. Triple-checking door locks at night.  She has had changes in eating behavior. Comment: Patient is engaged in freeing frequent binge eating often getting 10-20 lb a time losing it however patient's of food patterns have not otherwise become for strict it. Per family she has always had a sweet tooth however the cycling and range of this behavior has increased  She has been exhibited symptoms to suggest increased consumption of food and/or alcohol/cigarettes.  She denies oral exploration or consumption of inedible objects.  Psychiatric:   She does feel depressed. Comment: 4/2020: Ms. Garcia said she feels sad at  times. Her daughter said she has noticed depressed mood and lack of self care. Her daughter indicated friends and medical providers have noticed changes. 2021: Improvement in depressed mood with medication but tearful at times without a clear cause. Her daughter said she is more timid and unsure.  She is exhibiting symptoms of social withdrawal/indifference.  She does have anxiety. Comment: 2020: None reported. 2021: Significant anxiety, at times (e.g., riding in car, around the house, going to an art class), but no anxiety at other times.  She does exhibit cycling behavior.  She does exhibit hyperactive behavior.  She is not exhibited symptoms of paranoia.  She does have delusions.  She does have hallucinations. Comment: 2020: None reported 2021: After her ankle fracture, she saw snakes on the floor of the bedroom at night and during the day. She was not taking pain medication at the time. Around , she saw and communicated with two little children in her courtyard. One evening in December, she saw and talked with their  dog, who she saw in a tree outside the window.  She does have a history of sensitivity to neuroleptic/psychotropic medications.  Medical Review of Systems:   Ms. Garcia does not have constipation.  Ms. Garcia does have urinary incontinence. Comment: urinary incontinence/urge as of 2021  Ms. Garcia does have anosmia.  Ms. Garcia reports symptoms that are suggestive of orthostatic lightheadedness.  Ms. Garcia's weight is unstable. Comment: 2020: Her daughter reported changes in diet. She eats less when depressed but eats sweets and coke. Her daughter is doing the grocery shopping. 2021: Her daughter has noticed some weight loss  Functional status:  Difficulty performing the following Instrumental ADLs:  Household chores: Yes  Food Preparation: Yes  Shopping: Yes  Ability to Handle Finances: Yes  Transportation/Driving: Yes  Household Appliances/Stove:  Yes  Laundry: Yes  Difficulty performing the following Basic ADLs:  Dressing: No  Bathing: No  Toileting: No  Personal hygiene and grooming: No  Feeding: No  Care Management:  Patient/Family Safety Concerns:  Medication Adherence: Yes  Home Safety: No  Wandered: No  Firearms: No  Fall Risk: No  Home Alone: No       Past Medical History:   Diagnosis Date    Dyslipidemia 10/2/2015    Fall 2020    with fracture    Hyperlipidemia     Hypertension     minimal    Migraines     Obesity 10/2/2015    Patent foramen ovale 10/2/2015       Past Surgical History:   Procedure Laterality Date    COLONOSCOPY N/A 2017    Procedure: COLONOSCOPY;  Surgeon: Gerber Naylor MD;  Location: [facility] ENDO;  Service: Endoscopy;  Laterality: N/A;    HYSTERECTOMY      KNEE ARTHROPLASTY Bilateral 2020    meniscus repair    left elbow      left knee      OOPHORECTOMY      OPEN REDUCTION AND INTERNAL FIXATION (ORIF) OF INJURY OF ANKLE Right 11/3/2020    Procedure: ORIF, ANKLE;  Surgeon: Ted Rome MD;  Location: [facility] OR;  Service: Orthopedics;  Laterality: Right;       Family History   Problem Relation Age of Onset    Diabetes Father     Heart disease Father     COPD Father     Lung cancer Brother     Cancer Sister        Social History     Socioeconomic History    Marital status:      Spouse name: josé   Tobacco Use    Smoking status: Former     Packs/day: 1.00     Years: 20.00     Pack years: 20.00     Types: Cigarettes     Quit date:      Years since quittin.6    Smokeless tobacco: Never    Tobacco comments:     1-2ppd   Substance and Sexual Activity    Alcohol use: Yes     Alcohol/week: 1.0 standard drink     Types: 1 Shots of liquor per week     Comment: occ    Drug use: Never    Sexual activity: Yes     Partners: Male     Birth control/protection: Post-menopausal, None     Social Determinants of Health     Financial Resource Strain: Low Risk     Difficulty of Paying Living Expenses: Not hard at all    Food Insecurity: No Food Insecurity    Worried About Running Out of Food in the Last Year: Never true    Ran Out of Food in the Last Year: Never true   Transportation Needs: No Transportation Needs    Lack of Transportation (Medical): No    Lack of Transportation (Non-Medical): No   Physical Activity: Inactive    Days of Exercise per Week: 0 days    Minutes of Exercise per Session: 0 min   Stress: Stress Concern Present    Feeling of Stress : Rather much   Social Connections: Unknown    Frequency of Communication with Friends and Family: Never    Frequency of Social Gatherings with Friends and Family: Once a week    Active Member of Clubs or Organizations: No    Attends Club or Organization Meetings: Never    Marital Status:    Housing Stability: Low Risk     Unable to Pay for Housing in the Last Year: No    Number of Places Lived in the Last Year: 1    Unstable Housing in the Last Year: No       Medication:     Current Outpatient Medications on File Prior to Visit   Medication Sig Dispense Refill    acetylcysteine, bulk, Powd TAKE 2 TABLETS (1200MG) DAILY FOR 3 WEEKS, THEN TAKE 4 TABLETS DAILY 120 g 1    cholecalciferol, vitamin D3, 1,250 mcg (50,000 unit) capsule Take 50,000 Units by mouth every 7 days.      donepeziL (ARICEPT) 10 MG tablet TAKE 1 TABLET BY MOUTH EVERY DAY IN THE EVENING 90 tablet 3    ergocalciferol (ERGOCALCIFEROL) 50,000 unit Cap Take 50,000 Units by mouth every Sunday.       gabapentin (NEURONTIN) 100 MG capsule Take 1 capsule (100 mg total) by mouth every evening. 30 capsule 3    lamoTRIgine (LAMICTAL) 25 MG tablet Take 1 tablet (25 mg total) by mouth 2 (two) times daily. 180 tablet 1    midodrine (PROAMATINE) 10 MG tablet Take 1 tablet (10 mg total) by mouth 3 (three) times daily with meals. Take 1 tablet in AM and 1/2 tablet at lunch and 1/2 tablet at dinner for 1 week, then take 1 tablet in AM, 1 tablet at lunch, and 1/2 tablet in PM for 1 week, then take 1 tablet three times a  day with meals 90 tablet 2    rosuvastatin (CRESTOR) 20 MG tablet Take 1 tablet (20 mg total) by mouth once daily. 90 tablet 3    simethicone (GAS-X MAXIMUM STRENGTH ORAL) 2 (two) times a day.       suvorexant (BELSOMRA) 10 mg Tab Take 1 tablet by mouth every evening. 30 tablet 3    UNABLE TO FIND       UNABLE TO FIND       UNABLE TO FIND       UNABLE TO FIND       UNABLE TO FIND        No current facility-administered medications on file prior to visit.        Review of patient's allergies indicates:   Allergen Reactions    Sucrose Other (See Comments)     GI issues -       Medications Reconciliation:   I have reconciled the patient's home medications and discharge medications with the patient/family. I have updated all changes.  Refer to After-Visit Medication List.    Objective:  Vital Signs:  There were no vitals filed for this visit.  Wt Readings from Last 3 Encounters:   08/10/22 1413 78.8 kg (173 lb 11.2 oz)   06/27/22 1454 79.2 kg (174 lb 11.2 oz)   04/07/22 1336 83.5 kg (184 lb 1.6 oz)     There is no height or weight on file to calculate BMI.     Neurological examination:    Mental Status:   Ms. Garcia is awake; Her energy level appeared normal.   Comment: significant improvement in energy level compared to last time seen no longer appears hypomanic  Her appearance is normal (hygiene is appropriate; attire is proper and clean).   Comment: patient is clean and more attentive to details significant improvement compared to last time seen  She has appropriate attention/concentration (NADEEM/WARNER forwards and backward).  She can complete three-step commands.  Her thought process is logical and goal-oriented.  She has evidence of hallucinations.  She has evidence of delusions.  She demonstrated impaired judgment based on actions and plans for the future.   Comment: Impaired Judgement  She demonstrated impaired insight based on actions, awareness of her illness, plans for the future.   Comment: Impaired  "insight  Throughout the interview, she is cooperative, her eye contact is appropriate.   Comment: significant improvement in exaggerated over familiar behavior does still appear to impaired social barriers however this is significant improvement compared to last time seen  Cranial Nerves:   Her saccadic initiation, velocity, and amplitude are normal.  Her eyelid assessment showed no apraxia. There was no eyelid dysfunction, retraction, or somers sign.  Her facial strength was normal.  Her facial expression was symmetric and appropriate to the context.  She does not require the use of hearing aids.  Her tongue showed no evidence of scalloping.  She can protrude their tongue beyond Her lips for >10 sec.  She can move their extended tongue back and forth rapidly.  She had no significant evidence of anterocollis or retrocollis.  Speech/Language:   Ms. Emerson speech was fluent, non-effortful, and her rate was appropriate to the context.  Her speech timbre is abnormal.   Comment: loud  Her speech rate is abnormal.   Comment: fast/pressured  Her respirations are within normal range and appropriate to context.  Her speech timbre is normal.  She has no articulation (segmental features) errors.  She has no speech dysdiadochokinesia with repetition of syllables such as "/PA/, /TA/, /KA/, /OM/".  She made no errors during the repetition of rapid syllables and or words such as "caterpillar" "", and "huckleberry"  She has no repetition errors of rapid sequences of consonants, such as in "Shinto Protestant" or "[employer]".  She has no prosody (suprasegmental features) errors.  Her stress assessment showed no repetition errors in linguistically complex words, including multisyllabic words ("planetarium," "questionable," "accomplishment," "phonetic.  Ms. Emerson speech is not dysarthric.  Ms. Emerson speech was without evidence of anomia.  She showed no evidence of anomia during spontaneous " "speech.  She showed no evidence of anomia during confrontational naming; 12/12 (correct chocolate bar, kangaroo, theater, Sabianist, doctor, potato, battery, ice cube tray, thermometer, flower, bomb, and calendar).  She makes no phonological loop errors.  She makes no errors during the repetition of gibberish words (e.g., "Supercalifragilisticexpialidocious," "Pigglywiggly," "Woospiedoo," "Zowzy," "Bazinga").  She makes no errors during the repetition of complex meaningless phrases (e.g., "The horse raced past the barn fell.", "The complex houses  and single soldiers and their families," "Wishes are hopping, and trees are west," and "Brushing liked to yvonne salamanca's direction").  She can comprehend commands that cross the midline (e.g., with your left thumb, touch your right ear).  She can comprehend commands that depend on syntax (e.g., point to the ceiling after you point to the floor).  Her speech is grammatically intact; (no function/semantic word substitutions, phonemic/semantic paraphasias, or binary confusion).  Motor:   Assessment of motor strength was symmetric and at minimal anti-gravity.  There is no pronator or downward drift.  There is no upward drift.  There is no outward/diagonal drift.  Deltoid L +5/5 R +5/5 Biceps L +5/5 R +5/5 Triceps L +5/5 R +5/5 Wrist extension L +5/5 R +5/5 Finger abduction L +5/5 R +5/5 Hip flexion L +5/5 R +5/5 Hip extension L +5/5 R +5/5 Knee flexion L +5/5 R +5/5 Knee extension L +5/5 R +5/5 Ankle flexion L +5/5 R +5/5 Ankle extension L +5/5 R +5/5  There is no myoclonus observed in Ms. Garcia's bilateral upper and lower extremities.  There are no fasciculations observed in Ms. Garcia's bilateral upper and lower extremities.  Coordination:   She has no cerebellar rebound bilaterally.  She has no visible tremor.  She has no kinetic tremor bilaterally.  She has no postural tremor bilaterally.  She has no resting tremor bilaterally.  She has evidence of interhemispheric " motor control deficits.  She demonstrates evidence of motor overflow.  She demonstrates alien limb phenomena.  She has dyskinetic movements.   Comment: on presentation patient has mild oral facial dyskinesias improved compared to baseline 3 months prior  She has no athetosis.  She has no choreiform movements.  She has no ballistic movements.  She has evidence of akathisia.   Comment: mild significant improvement compared to last time seen  She has evidence of tardive dyskinesia.   Comment: mild significant improvement compared to last time seen  Ms. Garcia's bilateral upper extremity coordination with finger tapping, pronation/supination, and the open-close fist showed no slowing, no hypometria, and no dysrhythmia inconsistent with bradykinesia.  Ms. Garcia's bilateral upper extremity coordination with finger tapping, pronation/supination, and the open-close fist showed slowing.  Ms. Garcia's bilateral upper extremity coordination with finger tapping, pronation/supination, and the open-close fist showed hypometria.  Ms. Garcia's bilateral upper extremity coordination with finger tapping, pronation/supination, and the open-close fist showed dysrhythmia.    Neuropsychological Evaluation Summary:     Prior Neurocognitive/Neuropsychological Evaluations  Summary from EMR:  Informal Neuropsych testing in December 2020  MoCA = 24/30  Informal Neuropsych testing in 3/17/21  Mini Mental Status Exam [MMSE] = 26/30  MoCA = 24/30  Informal Neuropsych testing in 4/2/21  Brijesh Cognitive Assessment [MoCA] = 20/22).  She had difficulty with auditory attention and delayed recall (2/5 words, 3/3 words with cueing).  Formal Neuropsych testing in 4/12/21  Ms. Garcia completed a neuropsychological evaluation in June 2020. Tests administered included: MoCA; MSVT; Test of Premorbid Functioning; Wechsler Adult Intelligence Scale, Fourth Edition (WAIS-IV) selected subtests; Wechsler Memory Scale, Fourth Edition (WMS-IV), selected  subtests; Neuropsychological Assessment Battery (NAB), selected subtests; Verbal fluency tests (FAS & animal naming; Lu et al., 2004 norms); Ellsworth Verbal Learning Test, Revised (HVLT-R; Form 1); [place] Making Test, parts A and B (Lu et al., 2004 norms); [place] Card Sorting Test-128 (WCST- 128; computer version), Oscar Complex Figure Test, Geriatric Depression Scale (GDS-30); Lees Anxiety Inventory (TABATHA).  Ms. Garcia's performance on neuropsychological testing suggested deficits in areas including executive functioning (with impact on aspects of visuospatial functioning), learning and retention of information, and attention and working memory. Language, processing speed, and aspects of visuospatial functioning were largely consistent with premorbid estimates, taking into  deficits. Comparisons to prior testing suggested declines across most areas that were compared. Testing did not strongly indicate a pattern that would suggest Alzheimer's dementia, as recognition memory and language were intact. Taken together, reports of cognitive, neuropsychiatric, physical, and functional change suggest that a diagnosis of dementia with Lewy bodies should be considered in the differential. Although her symptom picture and test patterns are not entirely consistent, the constellation of symptoms is most suggestive of this etiology at a mild to moderate level, with behavioral disturbance. Visuospatial functioning was also largely within normal limits, with relative reduction on a complex figure drawing task  2022-08-09:  Executive predominant multidomain major cognitive impairment   BEHAV5+ 5/6: See ROS section for a full description    Neurocognitive Evaluation completed on 08/30/2022:  Neuropsychiatric/Behavioral Focused Evaluation Assessment   BEHAV5+ 5/6 See ROS section for a full description   Laboratories:     Lab Date Value [Reference]   Autoimmune/Paraneoplastic Screening           GIANNI Screen  2021, Sep-20  2021, Sep-20  2021, Sep-20    None Detected [None Detected]  None Detected [None Detected]  None Detected [None Detected]      Sed Rate 2021, Sep-20  2021, Sep-20  2021, Sep-20    7 [0 - 29 mm/Hr]  7 [0 - 29 mm/Hr]  7 [0 - 29 mm/Hr]      Metabolic Screening   Hemoglobin A1C External 09/20/2021  5.2 [4.0 - 5.6 %]  5.2 [4.0 - 5.6 %]  5.2 [4.0 - 5.6 %]      Homocysteine 06/13/2020  5.0 [4.0 - 15.5 umol/L]  5.0 [4.0 - 15.5 umol/L]  5.0 [4.0 - 15.5 umol/L]      TSH 04/18/20202020, Apr-18    1.500 [0.40 - 4.00 uIU/mL]  1.760 [0.40 - 4.00 uIU/mL]      Glucose 2021, Apr-28    105 [70 - 110 mg/dL]      Albumin 2021, Apr-28 2021, Apr-28 2021, Jan-27    4.2 [3.5 - 5.2 g/dL]  4.2 [3.5 - 5.2 g/dL]  4.6 [3.5 - 5.2 g/dL]      Alkaline Phosphatase 2021, Apr-28 2021, Apr-28 2021, Jan-27    108 [38 - 145 U/L]  108 [38 - 145 U/L]  106 [38 - 145 U/L]      ALT 2021, Apr-28 2021, Apr-28 2021, Jan-27    25 [0 - 35 U/L]  25 [0 - 35 U/L]  26 [0 - 35 U/L]      AST 2021, Apr-28 2021, Apr-28 2021, Jan-27    32 [14 - 36 U/L]  32 [14 - 36 U/L]  37 (H) [14 - 36 U/L]      BILIRUBIN TOTAL 2021, Apr-28 2021, Apr-28 2021, Jan-27    0.9 [0.2 - 1.3 mg/dL]  0.9 [0.2 - 1.3 mg/dL]  0.9 [0.2 - 1.3 mg/dL]      PROTEIN TOTAL 2021, Apr-28 2021, Apr-28 2021, Jan-27    7.0 [6.0 - 8.4 g/dL]  7.0 [6.0 - 8.4 g/dL]  7.7 [6.0 - 8.4 g/dL]      Cholesterol 2021, Oct-28    177 [120 - 199 mg/dL]      HDL 2021, Oct-28    51 [40 - 75 mg/dL]      Non-HDL Cholesterol 2021, Oct-28    126 [mg/dL]      Triglycerides 04/18/2020  281 (H) [30 - 150 mg/dL]      Folate 04/18/2020  >20.0 [4.0 - 24.0 ng/mL]      Thiamine 2020, Apr-18    142 [70 - 180 nmol/L]      Vit D, 25-Hydroxy 04/18/2020  84 [30 - 96 ng/mL]  84 [30 - 96 ng/mL]  84 [30 - 96 ng/mL]      Vitamin B-12 2020, Apr-18    798 [210 - 950 pg/mL]      Vitamin B2 2020, Apr-18    15 [5 - 50 nmol/L]      Vitamin B6 2020, Jun-13 2020, Apr-18    397.3 (H) [20.0 - 125.0 nmol/L]  300.6 (H) [20.0 - 125.0  nmol/L]      Infectious Disease/Immunocompromised Screening   SARS-CoV-2 RNA, Amplification, Qual 2020, Nov-02    Negative      Standard Hematology Screen   Hematocrit 2021, Jan-27    44.4 [37.0 - 48.5 %]      Hemoglobin 2021, Jan-27    14.9 [12.0 - 16.0 g/dL]      MCV 2021, Jan-27    88 [82 - 98 fL]      Platelets 2021, Jan-27    261 [150 - 350 K/uL]      Neuroendocrine/Electrolyte Screening   BUN 2021, Apr-28    20 (H) [7 - 18 mg/dL]      Chloride 2021, Apr-28    106 [95 - 110 mmol/L]      Creatinine 2021, Apr-28    0.84 [0.5 - 1.4 mg/dL]      Potassium 2021, Apr-28    4.6 [3.5 - 5.1 mmol/L]      Sodium 2021, Apr-28    142 [136 - 145 mmol/L]      Delirium Screening   Bacteria, UA 2021, Dec-10    Few (A) [Negative /hpf]      Glucose, UA 2021, Dec-10    Negative      Ketones, UA 2021, Dec-10    Negative      Leukocytes, UA 2021, Dec-10    Negative      NITRITE UA 2021, Dec-10    Negative      Protein, UA 2021, Dec-10    Negative      RBC, UA 2021, Dec-10    0 [0 - 4 /hpf]      WBC, UA 2021, Dec-10    1 [0 - 5 /hpf]           Neuroimaging:    Brain Fluorodeoxyglucose-positron emission tomography on 8/20/21  Technique: Following injection of 11.1 mCi of F18 FDG, tomographic emission images were acquired of the brain with a dedicated full ring PET/CT fusion scanner. Non-contrast enhanced CT images were obtained as a transmission scan and for anatomic localization. Blood glucose level: 94 mg/dl. Uptake time: 1 hour 3 minutes. As per PERCIST criteria, SUVlbw is reported. An individualized dose optimization technique, automated exposure control, was utilized for the performed procedure.  Formal interpretation by Radiology:  Hypometabolism predominantly within the frontal lobes, and to a lesser degree within the left temporal and parietal lobes. 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  Independently reviewed radiological imaging by Phill Pablo MD. MPH. Behavioral Neurologist  Impression: : Though there is  visual evidence of decreased metabolism in the left frontal region, there is largely stable asymmetry of the high dorsal left prefrontal region since 2015 which will accordingly skew the relative illuminance of the region    Brain Dopamine transporter SPECT imaging on 8/27/2021  Technique: Following administration of thyroid blocking agent, patient received intravenous dose of 6.95 mCi of I123 Ioflupane. After 3 hour delay, SPECT imaging of the brain is performed.  Formal interpretation by Radiology:  Activity is absent within the left putamen and caudate nucleus. There is also diminished activity in the right putamen. IMPRESSION: ABNORMAL EXAMINATION WITH EVIDENCE FOR STRIATAL DOPAMINERGIC NEURODEGENERATION.  Independently reviewed radiological imaging by Phill Pablo MD. MPH. Behavioral Neurologist  Impression: : there is clear evidence of asymmetric left greater than right decreased dopaminergic reuptake strongly suggesting at minimum degeneration of the dopaminergic network    MRI brain/head with and without contrast on 5/22/2018  Technique: Multiplanar, multisequence pre and post-contrast MRI of the brain and IAC's was performed.  Comparison: None  Formal interpretation by Radiology:  Age-appropriate generalized involutional changes are seen along with findings most compatible with mild chronic microvascular ischemic changes.  Independently reviewed radiological imaging by Phill Pablo MD. MPH. Behavioral Neurologist  T1: No significant cortical atrophy pattern further testing to be a mild asymmetry in the left greater than right dorsal lateral prefrontal cortex  T2/FLAIR: scattered punctuated subcortical hyperintensity lesions consistent with microvascular ischemic changes were right greater than left  DWI/ADC: No Significant DWI hyperintensities/hypointensities. No ADC correlation.  SWI/GRE: No Significant hypointensities to suggest cortical/subcortical hemosiderin deposition.  Impression: :  largely normal MRI brain with scattered microvascular changes most predominantly in the right basal ganglia    MRI brain/head without contrast on 1/27/21  Technique: Multiplanar multisequence MR imaging of the brain was performed without the administration of intravenous contrast.  Comparison: CT head 01/27/2021, MRI brain 05/29/2018  Formal interpretation by Radiology:  Age-appropriate generalized cerebral volume loss with findings suggestive of mild chronic microvascular ischemic change.  Independently reviewed radiological imaging by Phill Pablo MD. MPH. Behavioral Neurologist  T1: Generalized cortical atrophy; mild left greater than right frontal greater than posterior. small cavum septum pellucidum. very mild right greater than left hippocampal atrophy. collectively atrophy pattern seems to be slightly increased compared to 2018  T2/FLAIR: mild bilateral scattered punctuated lesions consistent with hypertensive microvascular ischemic changes. Mild periventricular capping right greater than left. Two areas of focal cavitation in the corona radiata adjacent to the right basal ganglia.  DWI/ADC: No Significant DWI hyperintensities/hypointensities. No ADC correlation.  SWI/GRE: No Significant hypointensities to suggest cortical/subcortical hemosiderin deposition.  Impression: : mild generalized cortical atrophy slightly pronounced in the dorsal lateral prefrontal cortex with some scattered right greater than left subcortical microvascular changes consistent with hypertensive strokes. microvascular changes seem to be largely stable compared to 2018 however atrophy pattern seems to be slightly pronounced.     Procedures:    Electroencephalogram on 1/27/2021  Formal interpretation:  This is a normal EEG in an awake and drowsy adult. No potentially epileptiform activity was seen. Please be aware that a normal EEG does not exclude the possibility of an underlying seizure disorder.  Independently reviewed  Electroencephalogram by Phill Pablo MD. MPH. Behavioral Neurologist  Impression: : normal    Electrocardiogram on 1/27/21  Formal interpretation:  Vent. Rate : 048 BPM     Atrial Rate : 048 BPM    P-R Int : 182 ms          QRS Dur : 080 ms     QT Int : 450 ms       P-R-T Axes : 049 -21 024 degrees    QTc Int : 402 ms Sinus bradycardia Anterolateral infarct ,age undetermined Abnormal ECG  Independently reviewed Electrocardiogram by Phill Pablo MD. MPH. Behavioral Neurologist  Impression: : Received ECG has mild evidence of sinus node disease. HR (>=50-60) 48 BPM. Prolonged TX interval (>0.22 s). Broad QRS complex (> 0.12 s).    Echocardiography on 2/5/21  Formal interpretation:  Concentric hypertrophy and normal systolic function. The estimated ejection fraction is 60% Grade I left ventricular diastolic dysfunction. Normal right ventricular size with normal right ventricular systolic function. Mild tricuspid regurgitation. Normal central venous pressure (3 mmHg). The estimated PA systolic pressure is 29 mmHg.  Independently reviewed Echocardiography by Phill Pablo MD. MPH. Behavioral Neurologist  Impression: : Normal     Clinical Summary:     Ms. Garcia is a 76-year-old right-handed female with a relevant past medical history of Metabolic syndrome ( hypertension, hyperlipidemia, obesity), migraine headaches, MDD, ERENDIRA on CPAP, who presents reporting a 8-year history of gradually progressive neurocognitive impairment.       The clinical history is suggestive of:  Memory Impairment: STM encoding impairment, LTM encoding-retrieval impairment, Amnesia of fixation  Attention Impairment: Attention, Selective attention, Sustained attention, Shifting attention  Executive Impairment: Energization, Working Memory, Set-Shifting, Response Inhibition  Visuospatial Impairment: Allocentric Spatial Processing  Motor/Coordination Impairment: Sensory motor integration  Sensory Impairment: Limbic  Dysfunction  Behavior Impairment: Neurovegetative, Emotional Regulation, Self-Preservation Dysregulation, Social Coherence, Sensorimotor Dysregulation, Stimulation Dysregulation  Psychiatric Impairment: Signal-Noise Dysregulation, Mood Regulation, Fixed-False Beliefs, Hallucinations, Neuroleptic Sensitivity  Medical Review of Systems Impairment: Autonomic Dysfunction  iADL Impairment: Cesar Instrumental Activities of Daily Living Scale  The neurological examination is significant for:  Cortical Frontal-Parietal Disconnection: apraxia (limb-motor)  Cortical Transcallosal Disconnection: interhemispheric motor control (interhemispheric motor control ), motor efference (motor overflow, alien limb)  Executive Impairment: judgment, dysexecutive behavior (perseverative/stereotyped)  Movement Disorder (Hyperkinetic): dyskinetic movements, akathisia, tardive dyskinesia  Movement Disorder (Speech): abnormal vocal features (volume, rate)  Informal neuropsychology battery is positive (based on age and education) for:  Executive predominant multidomain major cognitive impairment   BEHAV5+ 5/6: See ROS section for a full description  Neurological imaging  Brain Fluorodeoxyglucose-positron emission tomography (8/20/21): Though there is visual evidence of decreased metabolism in the left frontal region, there is largely stable asymmetry of the high dorsal left prefrontal region since 2015 which will accordingly skew the relative illuminance of the region  Brain Dopamine transporter SPECT imaging (8/27/2021): there is clear evidence of asymmetric left greater than right decreased dopaminergic reuptake strongly suggesting at minimum degeneration of the dopaminergic network  MRI brain/head with and without contrast (5/22/2018): largely normal MRI brain with scattered microvascular changes most predominantly in the right basal ganglia  MRI brain/head without contrast (1/27/21): mild generalized cortical atrophy slightly pronounced in  the dorsal lateral prefrontal cortex with some scattered right greater than left subcortical microvascular changes consistent with hypertensive strokes. microvascular changes seem to be largely stable compared to 2018 however atrophy pattern seems to be slightly pronounced.        Assessment:        Ms. Emerson clinical presentation is behavior/psychiatric predominant major cognitive impairment (mild dementia) sufficient to impair activities of daily living (CDR-SOB: 4.5 , [place] iADL: 8/8 - Mild Dementia).    Ms. Melaras clinical presentation meets the criteria for Dementia (Wendy, et al. 2011 Alzheimer's & Dementia).     Concern regarding an intraindividual change in cognition diagnosed through a combination of history and objective cognitive assessment  Impairment in two or more cognitive domains  Interference with independence in functional abilities.  Represents a decline from previous levels of functioning.  Not explained by delirium or major psychiatric disorder        Ms. Melaras clinical syndrome is unclear and has features of multiple syndromes. Ms. 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 Neurology 2005). 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      The pathology underlying Ms. Emerson cognitive impairment is unclear. Possibilities include a mixed Lewy body disease/alpha-synucleinopathy and and dorsal prefrontal ADRP. Frontotemporal Dementia with Parkinsonism-17/Three-Repeat Tauopathy remains a possibility however the lack of parkinsonism on examination. 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 As such this is speculation at this time. Known Genetics: Negative for [facility] testing of 33 genes selected ALS2 ANG ANXA11 DAMARIS CHCHD10 CHMP2B DCTN1 ERBB4 FUS GRN HEXA GDIAPA4L6 ITM2B KIF5A MAPT OPTN PFN1 PRNP PSEN1 PSEN2 SETX SNCA SOD1 SORL1 SPG11 SQSTM1 TARDBP TBK1 TFG TREM2 UBQLN2 VAPB VCP.      At the observations and made above were discussed with the patient and her family. Patient has responded to Belsomra 10 mg however incompletely. Recommend increasing to 20 mg and maintaining CPAP compliance. The patient continues to have bothersome excoriation, picking, dyskinetic or pharyngeal movements, and mood disorder. Recommend increasing Lamictal to 50 mg b. I. D. May consider the addition of clonazepam at later date. 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 Will follow-up following titration of N-acetylcysteine as well as retrieval of results for extended [employer] genetic testing.        Care Management Plan:     #Insomnia  Increase Belsomra to 20 mg   Continue Gabapentin 100mg HS  We recommend compliance with CPAP  Next appt will consider low-dose clonazepam for bruxism and RBD  #Neurocognitive Disorder Treatment:  increase Lamictal to 50 mg BID over 2 weeks.  Continue Donepezil 10 mg q.day  Continue Vitamin D3 50,000IU once per month  We have discussed the [employer] test  f/u results for extended [facility] genetic testing   #Exocriation disorder:  Continue n-acetylcysteine titration to goal 3000mg/daily (Maurice et al., 2016)  #Orthostatic hypotension:  Continue midodrine 10mg TID  #Microvascular Disease Management:  Continue rosuvastatin 20mg  Continue aspirin 81 mg  #Behavioral/Environmental Treatment  We recommend engaging in activities that stimulate cognitively and  socially while avoiding excessive stimulation and fatigue in overwhelmingly complex situations.  We recommend integrating routine and schedule into your daily life. https://www.alzheimersproject.org/news/the-importance-of-routine-and-familiarity-to-persons-with-dementia/  #Health Maintenance/Lifestyle Advice  We have discussed the value in aggressively controlling vascular risk factors like hypertension, hyperlipidemia, and Diabetes SBP<130, LDL<100, A1C<7.0.  We discussed the need to optimize lifestyle choices including a heart-healthy diet (e.g., Mediterranean or DASH), increased cardiovascular exercise (goal 150 minutes of moderate-intensity per week), and stay cognitively and socially active.  #Support  We all need support sometimes. Get easy access to local resources, community programs, and services. https://www.communityresourcefinder.org/  Learn more about Cognitive Impairment in [place]: https://www.alz.org/professionals/public-health/state-overview[place]  Learn more about your local community's dementia and neurocognitive impairment resources in [place]: https://www.alz.org/[place]/helping_you  Learn more about your local support for dementia: https://www.alz.org/[place]/helping_you/support  Learn more about housing support for people living with dementia in [place]: https://www.memorycare.[employer]/memory-care-in[place]/  Learn more about financial support for people living with dementia in [place]: https://www.dementiacarecentral.EyeLock/financial-assistance/  Learn more about caregiver support with the Family Caregiver [place]: http://www.caregiver.org  #Safety  Individuals living with Alzheimer's disease and other dementias are at increased risk for injury or harm in certain areas of the home. As the disease progresses, they may become unaware of the dangers that exist. Consider taking the following precautions to create a safe environment that can prevent dangerous situations from occurring  "and help maximize independence for as long as possible. https://www.alz.org/help-support/resources/home_safety_checklist  [place] has no laws against driving with dementia specifically but obviously has laws about medical conditions which impact a person's ability to drive safely. If you believe your loved one's driving capacity has diminished, please reach out to either your primary care physician or our office to discuss driving restrictions or revocation of their license. To learn more: https://www.dementiacarecentral.com/caregiverinfo/driving-problems/  The [employer] administers the nationwide "Safe Return" program with identification bracelets, necklaces, or clothing tags and 24-hour assistance. More information is available online at https://www.alz.org/help-support/caregiving/safety/medicalert-with-24-7-wandering-support  #Follow up:  Follow-up in 4 weeks (Sep 2022).    Thank you for allowing us to participate in the care of your patient. Please do not hesitate to contact us with any questions or concerns.     It was a pleasure seeing Ms. Garcia and we look forward to seeing them at their follow-up visit.     This note is dictated on M*Modal Fluency Direct word recognition program. There are word recognition mistakes that are occasionally missed on review.      Scheduled Follow-up :  Future Appointments   Date Time Provider Department Center   9/2/2022 11:30 AM [facility] MAMMO1 [facility]   12/5/2022  2:40 PM [facility] DEXA 1 LIMIT 350 LBS [facility]   2/27/2023  2:30 PM BRENDEN Bailey Jr., MD [facility] [place]       After Visit Medication List :     Medication List            Accurate as of August 30, 2022 10:28 AM. If you have any questions, ask your nurse or doctor.                CONTINUE taking these medications      acetylcysteine (bulk) Powd  TAKE 2 TABLETS (1200MG) DAILY FOR 3 WEEKS, THEN TAKE 4 TABLETS DAILY     BELSOMRA 10 mg Tab  Generic drug: " suvorexant  Take 1 tablet by mouth every evening.     cholecalciferol (vitamin D3) 1,250 mcg (50,000 unit) capsule     donepeziL 10 MG tablet  Commonly known as: ARICEPT  TAKE 1 TABLET BY MOUTH EVERY DAY IN THE EVENING     ergocalciferol 50,000 unit Cap  Commonly known as: ERGOCALCIFEROL     gabapentin 100 MG capsule  Commonly known as: NEURONTIN  Take 1 capsule (100 mg total) by mouth every evening.     GAS-X MAXIMUM STRENGTH ORAL     lamoTRIgine 25 MG tablet  Commonly known as: LAMICTAL  Take 1 tablet (25 mg total) by mouth 2 (two) times daily.     midodrine 10 MG tablet  Commonly known as: PROAMATINE  Take 1 tablet (10 mg total) by mouth 3 (three) times daily with meals. Take 1 tablet in AM and 1/2 tablet at lunch and 1/2 tablet at dinner for 1 week, then take 1 tablet in AM, 1 tablet at lunch, and 1/2 tablet in PM for 1 week, then take 1 tablet three times a day with meals     rosuvastatin 20 MG tablet  Commonly known as: CRESTOR  Take 1 tablet (20 mg total) by mouth once daily.     UNABLE TO FIND     UNABLE TO FIND     UNABLE TO FIND     UNABLE TO FIND     UNABLE TO FIND              Signing Physician:  Phill Villanueva MD    Billing:    -----------------------------------------------------------------------------    I performed this consultation using real-time Telehealth tools, including a live video connection between my location and the patient's location. Prior to initiating the consultation, I obtained informed verbal consent to perform this consultation using Telehealth tools and answered all the questions about the Telehealth interaction. The participants understand that only a limited neurological exam and limited neuropsychological testing can be performed using Telehealth tools.    I spent a total of 40 minutes (time-in: 10:20 AM; time-out: 11:00 AM) on 08/30/2022, in-person face-to-face with the patient and caregiver(s), >50% of that time was spent counseling regarding the symptoms, treatment plan,  risks, therapeutic options, lifestyle modifications, and/or safety issues for the diagnoses above.    10/14 Review of Systems completed and is negative except as stated above in HPI (Systems reviewed: Const, Eyes, ENT, Resp, CV, GI, , MSK, Skin, Neuro)    Total Billing time spent on encounter/documentation for this patient's evaluation and management: 40 minutes.

## 2022-08-31 ENCOUNTER — TELEPHONE (OUTPATIENT)
Dept: NEUROLOGY | Facility: CLINIC | Age: 77
End: 2022-08-31
Payer: MEDICARE

## 2022-09-02 ENCOUNTER — PATIENT MESSAGE (OUTPATIENT)
Dept: NEUROLOGY | Facility: CLINIC | Age: 77
End: 2022-09-02
Payer: MEDICARE

## 2022-09-06 ENCOUNTER — PATIENT MESSAGE (OUTPATIENT)
Dept: NEUROLOGY | Facility: CLINIC | Age: 77
End: 2022-09-06
Payer: MEDICARE

## 2022-09-19 ENCOUNTER — PATIENT MESSAGE (OUTPATIENT)
Dept: NEUROLOGY | Facility: CLINIC | Age: 77
End: 2022-09-19
Payer: MEDICARE

## 2022-09-23 ENCOUNTER — PATIENT MESSAGE (OUTPATIENT)
Dept: NEUROLOGY | Facility: CLINIC | Age: 77
End: 2022-09-23
Payer: MEDICARE

## 2022-09-26 ENCOUNTER — PATIENT MESSAGE (OUTPATIENT)
Dept: NEUROLOGY | Facility: CLINIC | Age: 77
End: 2022-09-26
Payer: MEDICARE

## 2022-09-26 DIAGNOSIS — I95.1 ORTHOSTATIC HYPOTENSION: ICD-10-CM

## 2022-09-26 RX ORDER — MIDODRINE HYDROCHLORIDE 5 MG/1
10 TABLET ORAL
Qty: 180 TABLET | Refills: 2 | Status: SHIPPED | OUTPATIENT
Start: 2022-09-26 | End: 2022-09-26

## 2022-09-26 RX ORDER — MIDODRINE HYDROCHLORIDE 5 MG/1
10 TABLET ORAL
Qty: 180 TABLET | Refills: 2 | Status: SHIPPED | OUTPATIENT
Start: 2022-09-26 | End: 2022-12-23

## 2022-10-12 ENCOUNTER — PATIENT MESSAGE (OUTPATIENT)
Dept: NEUROLOGY | Facility: CLINIC | Age: 77
End: 2022-10-12
Payer: MEDICARE

## 2022-12-15 ENCOUNTER — PATIENT MESSAGE (OUTPATIENT)
Dept: NEUROLOGY | Facility: CLINIC | Age: 77
End: 2022-12-15
Payer: MEDICARE

## 2023-01-12 ENCOUNTER — PATIENT MESSAGE (OUTPATIENT)
Dept: NEUROLOGY | Facility: CLINIC | Age: 78
End: 2023-01-12
Payer: MEDICARE

## 2023-01-20 ENCOUNTER — PATIENT MESSAGE (OUTPATIENT)
Dept: NEUROLOGY | Facility: CLINIC | Age: 78
End: 2023-01-20
Payer: MEDICARE

## 2023-01-23 RX ORDER — SUVOREXANT 20 MG/1
1 TABLET, FILM COATED ORAL NIGHTLY
Qty: 90 TABLET | Refills: 3 | Status: SHIPPED | OUTPATIENT
Start: 2023-01-23 | End: 2023-07-25 | Stop reason: SDUPTHER

## 2023-02-02 PROBLEM — F02.80 LEWY BODY DEMENTIA WITHOUT BEHAVIORAL DISTURBANCE: Chronic | Status: RESOLVED | Noted: 2021-05-05 | Resolved: 2023-02-02

## 2023-02-02 PROBLEM — G31.83 LEWY BODY DEMENTIA WITHOUT BEHAVIORAL DISTURBANCE: Chronic | Status: RESOLVED | Noted: 2021-05-05 | Resolved: 2023-02-02

## 2023-03-06 ENCOUNTER — PATIENT MESSAGE (OUTPATIENT)
Dept: NEUROLOGY | Facility: CLINIC | Age: 78
End: 2023-03-06
Payer: MEDICARE

## 2023-03-08 PROBLEM — F60.9 PERSONALITY DISORDER: Status: ACTIVE | Noted: 2023-03-08

## 2023-03-09 ENCOUNTER — TELEPHONE (OUTPATIENT)
Dept: NEUROLOGY | Facility: CLINIC | Age: 78
End: 2023-03-09
Payer: MEDICARE

## 2023-03-09 NOTE — TELEPHONE ENCOUNTER
I called the patient mother to set up a virtual appointment on march 28, 2023 at 4:00.   
Name band;

## 2023-03-20 PROBLEM — E04.2 MULTIPLE THYROID NODULES: Status: ACTIVE | Noted: 2023-03-20

## 2023-03-28 ENCOUNTER — OFFICE VISIT (OUTPATIENT)
Dept: NEUROLOGY | Facility: CLINIC | Age: 78
End: 2023-03-28
Payer: MEDICARE

## 2023-03-28 DIAGNOSIS — F42.4 EXCORIATION (SKIN-PICKING) DISORDER: ICD-10-CM

## 2023-03-28 DIAGNOSIS — M54.2 CERVICALGIA: ICD-10-CM

## 2023-03-28 DIAGNOSIS — G90.1 DYSAUTONOMIA: ICD-10-CM

## 2023-03-28 DIAGNOSIS — G31.09 OTHER MODERATE FRONTOTEMPORAL DEMENTIA WITH OTHER BEHAVIORAL DISTURBANCE: Primary | ICD-10-CM

## 2023-03-28 DIAGNOSIS — G20.C PARKINSONISM, UNSPECIFIED PARKINSONISM TYPE: ICD-10-CM

## 2023-03-28 DIAGNOSIS — F02.80 FRONTOTEMPORAL LOBAR DEGENERATION: ICD-10-CM

## 2023-03-28 DIAGNOSIS — G31.83 MODERATE LEWY BODY DEMENTIA WITH OTHER BEHAVIORAL DISTURBANCE: ICD-10-CM

## 2023-03-28 DIAGNOSIS — G31.09 FRONTOTEMPORAL LOBAR DEGENERATION: ICD-10-CM

## 2023-03-28 DIAGNOSIS — F41.9 ANXIETY: ICD-10-CM

## 2023-03-28 DIAGNOSIS — F02.B18 OTHER MODERATE FRONTOTEMPORAL DEMENTIA WITH OTHER BEHAVIORAL DISTURBANCE: Primary | ICD-10-CM

## 2023-03-28 DIAGNOSIS — F02.B18 MODERATE LEWY BODY DEMENTIA WITH OTHER BEHAVIORAL DISTURBANCE: ICD-10-CM

## 2023-03-28 PROCEDURE — 96116 NUBHVL XM PHYS/QHP 1ST HR: CPT | Mod: 59,95,, | Performed by: PSYCHIATRY & NEUROLOGY

## 2023-03-28 PROCEDURE — 99417 PR PROLONGED SVC, OUTPT, W/WO DIRECT PT CONTACT,  EA ADDTL 15 MIN: ICD-10-PCS | Mod: 95,,, | Performed by: PSYCHIATRY & NEUROLOGY

## 2023-03-28 PROCEDURE — 99215 OFFICE O/P EST HI 40 MIN: CPT | Mod: 95,,, | Performed by: PSYCHIATRY & NEUROLOGY

## 2023-03-28 PROCEDURE — 99417 PROLNG OP E/M EACH 15 MIN: CPT | Mod: 95,,, | Performed by: PSYCHIATRY & NEUROLOGY

## 2023-03-28 PROCEDURE — 99215 PR OFFICE/OUTPT VISIT, EST, LEVL V, 40-54 MIN: ICD-10-PCS | Mod: 95,,, | Performed by: PSYCHIATRY & NEUROLOGY

## 2023-03-28 PROCEDURE — 96116 PR NEUROBEHAVIORAL STATUS EXAM BY PSYCH/PHYS: ICD-10-PCS | Mod: 59,95,, | Performed by: PSYCHIATRY & NEUROLOGY

## 2023-03-28 NOTE — PROGRESS NOTES
[facility]     PATIENT: Christine Garcia  VISIT DATE: 2023  MRN: [id number]  PRIMARY PROVIDER: BRENDEN Bailey Jr, MD  : 1945       Chief complaint: Progressive Cognitive Impairment     History of present illness:      The patient is a 77-year-old right-handed female who presents today to the [facility] due to concerns related to Progressive Cognitive Impairment.  The patient is accompanied by the daughter who participates in providing history.  Additional information is obtained by reviewing available medical records.     Relevant Background/Context  Known Relevant Family history:  Father lived into his early 90s without any significant medical problems  Mother lived into her late 80s with some mild dementia towards the last 2-3 years of her life however the but otherwise non bothersome.  All 3 children have ADHD like symptoms  Grandaunt possibly suggestive for bipolar disorder  Brother has childhood epilepsy  Neurocognitive Disorder:  Mother lived into her late 80s with some mild dementia towards the last 2-3 years of her life however the but otherwise non bothersome.  Movement Disorder:  The family denies a history of movement disorder (PD, PDD, tremor, etc).  Motorneuron Disorder:  The family denies a history of motor neuron disease (ALS).  Developmental Disorder:  All 3 children have ADHD like symptoms  Psychiatric Disorder:  Grandaunt possibly suggestive for bipolar disorder  Known Relevant Genetics:  Negative for [employer] testing of 33 genes selected ALS2 ANG ANXA11 DAMARIS CHCHD10 CHMP2B DCTN1 ERBB4 FUS GRN HEXA [id number] ITM2B KIF5A MAPT OPTN PFN1 PRNP PSEN1 PSEN2 SETX SNCA SOD1 SORL1 SPG11 SQSTM1 TARDBP TBK1 TFG TREM2 UBQLN2 VAPB VCP  Developmental Milestones:  The patient/family report no known birth complications or early life problems. The patient met all developmental milestones.  Education/Learning  Capacity:  The patient/family report no signs or symptoms suggestive of developmental learning disorder.  HS.  Estimated Educational Experience: 12 years of formal education.  Social History:  Lives with  and daughter. Patient has regular caretakers/sitters.  Career/Skill [place]:  Patient was an [employer] grade after [employer]. 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 She continues to [employer].  Retired/Quit: 0  Relevant Medical History:  Hypertension  Major depressive disorder Since 2017 - previous medications include Prozac, citalopram  Hyperlipidemia  Migraine headaches  Patent foramen ovale  Obesity  obstructive sleep apnea on CPAP     Neurocognitive Disorder Features  Onset/Duration:  Sep 2014 (~8-year)  First Symptom:  Memory impairment  Progression:  Gradually Progressive  Clinical Course:  Informant (Approximately Remote History)  On presentation today the patient's her family are the primary historians. The patient's her family report that the patient has had a lifetime history of cyclothymic/ bipolar tendencies without a formal diagnosis. The patient reports that she has had intermittent depression throughout her life but otherwise non bothersome and was never treated. The patient's her family reports that she has had episodes concerning for hypomania such as abnormally upbeat, increased activity/energy, self-confidence, talkativeness, and impaired thought process. 1 example provided includes for tendency to by excessive amounts of clothing that was financially problematic. The symptoms were otherwise non bothersome and did not significantly interfere with her family dynamics. The family reports that this baseline cycling euphoria and disc by me a began to increase in frequency and severity over the previous 4 years.  EMR  (Approximately 2016 to 2017)  her family reports that the patient was becoming mildly more forgetful and inattentive however her family thought this was within normal limits and part of normal aging.  EMR (Approximately 10/2019)  Patient's her  has acute chest ischemic stroke. The patient subsequently comes the primary her caregiver with a significant increase in household / daily stress. The patient began to show signs of worsening of baseline depression and began to lose weight having lost her appetite. her daughter has taken over managing the finances since his father stroke in October. Subacute worsening of memory and attention. She is resentful to her  who had a stroke in October 2019. her daughter reports she was uncharacteristically mean to husbands - appeared to show little empathy or sympathy.  EMR (Approximately 11/2020)  Started on Methylphenidate. her daughter reports 1st noticing hallucinations in November 2020 where she saw kids playing in her court your outside her for will living room at 6:00 p. M. At night. The association between Methylphenidate and hallucinations is unclear.  EMR (Approximately 04/2021)  The daughter reports new onset tremors in April 2021 described as right hand shaking on her lap.  EMR (Between 06/2020-09/2021)  To a lack of documentation in the chart it is unclear in what order the following medications were started and stopped. Venlafaxine,. Citalopram,. Fluoxetine,. Luvox,. Prozac,. Buproprion,. Seroquel,. Ritalin.  EMR (Approximately 2017)  Following the death of patient's mother there were significant psychosocial stressors with subjective worsening of mood and cognition. her family reports that patient's depressive symptoms were neurovegetative in nature with decreased energy, weight loss and apathy.  EMR (08/03/2017)  Due to weight gain she was started on lorcaserin.  EMR (Between 5189-0468)  Background worsening memory often repeating questions and stories  and increased cycling of mood.  EMR (03/02/2018)  Due to signs and symptoms consistent with depression the patient was started on buPROPion.  EMR (05/02/2018)  New SXS suggestive of vertigo. Discontinue lorcaserin. The patient was found to have BPPV left and Epley maneuver was performed.  EMR (03/31/2020)  The patient is seen by Neurology. Her daughter took over managing finances and reminds her to take her medications. Performance on a brief cognitive screening measure was within normal limits (modified [place] Cognitive Assessment [MoCA] = 20/22). She had difficulty with auditory attention and delayed recall (2/5 words, 3/3 words with cueing). Review of MRI brain from 05/2018 showed no evidence of abnormalities per neurologist. The patient was subsequently referred to neuropsychology for further evaluation and management.  EMR (04/06/2020)  She has difficulty remembering to take her medication but also has had times when she did not want to take it. Her daughter gives reminders and manages her father's medication. Ms. the patient has help with household tasks after her 's stroke. Her daughter said she was not keeping her home as neatly prior to October 2019. Her daughter helps with laundry and grocery shopping. her family took over after October 2019, but her daughter said they had concern for mistakes prior to that time.  EMR (06/17/2020)  The patient underwent neuropsychological testing. Ms. Garcia's premorbid intellectual abilities were estimated to be in the average range based on word reading and demographic information. She demonstrated intact attention, working memory, and processing speed. Executive functioning was notable for intact set-shifting but difficulty with organization on a drawing task and reduced letter fluency. Language was intact, as was visuospatial functioning (with the exception of a complex figure). Learning was intact, but recall of learned information was below expectation  compared to premorbid estimates. Recognition was intact for information given with and without context. Ms. the patient endorsed mild depression and minimal anxiety on self-report measures. She was diagnosed with mild dementia.  EMR (06/26/2020)  The patient presents to Neurology to report of concerns of memory decline. Her daughter reports that she is often repeating conversations throughout the day. Her friends become more worried. She has been having decline household chores. The patient started on Aricept 10 mg nightly and was decreased down to 5 mg.  EMR (12/02/2020)  MOCA 24/30. The patient presents on Aricept 5 mg at bedtime and Namenda 5 mg b. I. D. her family reports that she has become more obsessive with frequent picking of her face causing sores. She is currently on escitalopram after stopping Prozac. Neurologist recommended stopping methylphenidate and acquiring EKG while on Celexa.  EMR (01/27/2021)  Syncopal event resulting in ER admission. The patient was found unresponsive lying in bed with slurred speech. Orthostatic blood pressure in the ED. Lying BP: 146/67. Sitting BP: 148/71. Standing BP: 105/77.  EMR (01/28/2021)  To reports hallucinations however this is not elaborated upon in the note. The patient was referred order to restart taking methylphenidate on 01/27/2021 however this was not done yet.  EMR (03/17/2021)  EEG showed no evidence of QT prolongation. Tilt table showed hypertension. She was started on midodrine without subsequent syncopal episodes. The patient has stopped stimulants since 12/2020 which include Concerta and Ritalin. The patient continues to take Aricept. her family reports began behavior has worsened. She is often picking both sides of her face hands neck chest and back. She stopped Namenda. Her primary psychiatrist this was after to sertraline. Two reports she started having hallucinations during the day and night. Examples of hallucinations include that she saw her dog  , saw snakes on the ground.  EMR (2021)  Neuropsychology testing is now consistent with Lewy body dementia. Referred to movement Disorder for abnormal picking behavior.  EMR (2021)  GODFREY Scan on Friday reviewed in consistent with his dopamine dysregulation. Hallucinations. her daughter reports 1st noticing hallucinations in 2020 where she saw kids playing in her court your outside her for will living room at 6:00 p. M. At night. Since initial hallucination the patient has seen her dead dog who  greater than 20 years ago often in the evening, sometimes outside her window in the tree them. The patient is often found talking to hallucinations of her family members in the daytime and night. She often sees snakes on the ground in her bathroom. She often daydreams about an imaginary friend: Adeel.  [facility] - Phill Villanueva MD. Neurologist (2021)  On presentation, the patient's her daughter is the primary historian. Patient's her daughter reports a complicated history of cognition/memory impairment and behavioral issues that have been confounded by polypharmacy and medication side effect. The patient has been diagnosed with multiple syndromes and has received extensive workup with inconclusive results. Ms. Garcia's clinical presentation is behavior/psychiatric predominant major cognitive impairment (mild dementia) sufficient to impair activities of daily living (CDR-SOB: 4. 5 , [place]-Sky iADL: 8/8 - Mild Dementia). Ms. Garcia's clinical syndrome is unclear and has features of multiple syndromes including bvFTD and DLB. Frontotemporal dementia (FTD) and dementia with Lewy bodies (DLB) are distinct neurodegenerative syndromes. It is well known that patients with pathologically confirmed FTD can later develop Parkinsonism and patients with DLB typically have executive dysfunction often in association with visuospatial dysfunction. Less commonly, some patients may have  mixed features of FTD and DLB. However, it remains unknown whether patients can have presenting features that simultaneously meet consensus criteria for FTD and DLB. 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 The pathology underlying Ms. Garcia's cognitive impairment is unclear. Possibilities include a mixed Lewy body disease/alpha-synucleinopathy and and dorsal prefrontal ADRP. Frontotemporal Dementia with Parkinsonism-17/Three-Repeat Tauopathy remains a possibility however the lack of parkinsonism on examination. 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 As such this is speculation at this time. On presentation today, the primary complaints voiced by the patient and her family are polypharmacy and medication side effects. We discussed at length patient's recent imaging clinical history and medication history. We discussed the likely side effects patient's prior exposure to Concerta, Ritalin, venlafaxine, Seroquel, Zyprexa, and various other psychotropic/ neuroleptics may have had in playing a role in her recent tics, tardive dyskinesia, and picking disorders. We discussed the value of limiting dopamine and norepinephrine blocking and modifying agents in the setting of a known dopaminergic deficit as evident by GODFREY scan and clinical history. We discussed us the need for to consolidate care with  multidisciplinary team. The patient and her family have voiced desire to set up with a primary geriatrician at [facility]. We will begin by consolidating care with dedicated geriatrician. We will discuss future plans with primary psychiatrist and limiting neuroleptics/psychotropic medications. In addition we will begin decreasing venlafaxine delayed stimulating qualities in the setting of increasing agitation and cycling behavior. The patient is currently on 75 mg will be decreased down to 37. 5 before changing any additional medications. The patient has recently been started on Zyprexa by primary psychiatrist due to increasing aggression directed towards hallucinations. We will discuss this treatment option with primary psychiatry and strongly recommend limiting D2 blocking agents in the setting of known history of neuroleptic sensitivity as well as ongoing symptoms of tardive dyskinesia. Following removing all potential dopamine and norepinephrine modulating agents as possible we will consider potentially trying mood stabilizing agents with different mechanisms.  Neurologist (10/28/2021)  Since last time seen venlafaxine has been stopped. Zyprexa has been continued at low dose Without any noticeable side effects. However upon further review since starting Zyprexa the patient has gained 5-10 lb. With worsening of baseline obstructive sleep apnea. The patient has attempted gabapentin (300-600mg) however was too dizzy to tolerate the patient. The patient has not yet set up with a geriatrician. On presentation today we discuss additional effort to maximize medical therapy. The patient is taking donepezil and memantine without notable side effects. The patient is distally midodrine for symptomatic hypotension. The patient is taking multiple over-the-counter supplements which include vitamin-D and C for which we have no concern. The patient discusses desire to be on less medications so we have time decrease the  frequency of her over-the-counter vitamin supplementation. On presentation today the patient appears significantly better compared to last time seen. Tardive dyskinesia has significantly improved. There is no visible tremor on examination nor are there any noticeable picking movements. Patient's her daughter indicates the patient continues to pick at home however it has decreased. Furthermore patient's agitation and irritability with her daughter have significantly improved. Ongoing issues today include worsening of baseline insomnia with advanced sleep phase disorder and worsening of baseline obstructive sleep apnea with persistent daytime fatigue. At the base of these issues seem to be a lack of meaningful activities during the daytime. The patient has engaged in [employer] for many years and has been vigorously involved in this as early as 5 years ago however this has decreased considerably in recent years. We discussed potential opportunities for social engagement including needlepoint groups. We discussed need for cardiovascular exercise. The patient was a for lytics where we have discussed the benefits of aqua therapy. We discussed risks versus benefits of ongoing medical therapy for insomnia. We agreed to start with melatonin and or ramelteon as well as engaging in new sleep behaviors. We strongly recommend against using any stimulants at this time specifically anything that modulates dopamine or nerve in Afrin. The patient is taking 1 cup of coffee per day typically in the morning. The patient has stopped Seroquel, Namenda, Effexor, and Allegra. The patient started gabapentin last week about after today's was unable to walk straight. Is on Zyprexa 2. 5 mg. Tardive dyskinesia has improved. Hallucinations have improved. Patient's affect and mood have improved significantly. She is now happy, sweet, and kind her daughter. Aggressive outbursts have decreased. Presentation favoring BV of TD. Poor insight.  Continues of compulsive behaviors or personality changes. No clear parkinsonism on examination. Continue donepezil 10 mg daily.  [facility] - Phill Villanueva MD. Neurologist (12/07/2021)  Type: Chart Review. Since last time seen, the patient reports difficulty acquiring ramelteon. Despite multiple attempts by the office the patient is would be expected the patient over 300 dollars out of pocket for medication. As such the patient not started medication. The patient continues to report bothersome insomnia with refractory advanced phase sleep disorder. We recommended restarting melatonin 5 mg with gabapentin 100 mg. If the patient is unresponsive will try Belsomra 10 mg. Other issues addressed today include new urinary incontinence. her family reports finding the patient has been wetting her clothing in recent weeks. On 1 occasion the patient reports finding blood in her urine however it is uncertain whether not there is a language barrier due to degenerative disorder. her family is at the C objective evidence blood in urine. Per the report of the family signs and symptoms are suggestive of urge incontinence however her family will investigate this further. Recommend starting out with a urine analysis to confirm no evidence of urinary infection. Following this if urine UA is negative we will start myrbetriq. Additionally we discussed the opportunity for genetic counseling today. The patient and her daughter are interested however like to think about it. 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 Given evidence of FTLD with parkinsonism there is the possibility for MAPT mutation. Ms. Garcia's clinical presentation is behavior/psychiatric predominant major cognitive impairment (mild dementia) sufficient to impair activities of daily living (CDR-SOB: 4. 5 , [place]-Sky iADL: 8/8 - Mild Dementia).  Frontotemporal dementia (FTD) and dementia with Lewy bodies (DLB) are distinct neurodegenerative syndromes. It is well known that patients with pathologically confirmed FTD can later develop Parkinsonism and patients with DLB typically have executive dysfunction often in association with visuospatial dysfunction. Less commonly, some patients may have mixed features of FTD and DLB. 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 The pathology underlying Ms. Garcia's cognitive impairment is unclear. Possibilities include a mixed Lewy body disease/alpha-synucleinopathy and and dorsal prefrontal ADRP.   Frontotemporal Dementia with Parkinsonism-17/Three-Repeat Tauopathy remains a possibility however the lack of parkinsonism on examination. 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 As such this is speculation at this time. Since last time seen the patient continues to report improvement in movement and tardive dyskinesia.   the patient continues to be irritable with advanced sleep phase disorder and mild atypical movements however the significantly improved.   the patient is longer no longer gaining weight after discontinuation of Zyprexa.   Patient's agitation is under control. We discussed us the need for to consolidate care with multidisciplinary team.   the patient has not yet set up with a dedicated geriatrician. Primary concerns and ongoing issues today seem to stem from poor sleep  behavior.   Patient   Was not able to start ramelteon 8mg  due to cost.   Recommend starting melatonin 5 mg with gabapentin 100 mg.   We will start the approval process for Belsomra. Given new urinary incontinence have requested urinalysis with reflex culture.   Following negative results will start mybetriq. her family has expressed interest in genetic testing for FTLD.    Following confirmation with her family will order [facility] Hereditary Amyotrophic Lateral Sclerosis, Frontotemporal Dementia and Alzheimer Disease Panel.  [facility] - Phill Villanueva MD. Neurologist (12/21/2021)  Type: Chart Review. Since last time seen her family reporting mild improvement in sleep in general behavior. The patient was recently diagnosed with the UTI and treated for 10 days with Bactrim DS. Patient's last day is today. During the same timeframe her family reports a slight increase in patient's oral facial dyskinesias. During last discussion her family endorsed new ileum limb phenomena as well as talking to herself which seems to be at its baseline. her family continues to report concerns of cycling behavior we frustration and irritability. Sleep is largely non bothersome however does tend to have significant daytime fatigue with variable sleep latency. The patient has not started Belsomra. The patient is taking gabapentin 100 mg at night. We have discussed in prior counters of opportunities for treatment for cycling behavior. Given patient's sensitivity medication we have been hesitant. We discussed potential antiepileptics which include gabapentin Lamictal. Recommend trial of Lamictal 25 mg for cycling behavior. The patient is reticent however interested. We discussed medication monitoring. In addition the patient presents today to discuss potential genetic testing. We discussed patient's diagnosis of frontal temporal lobar degeneration and the potential genetic etiologies. her family is interested. The patient is  interested. We will pursue. Counseling has been provided at bedside.  the observations made above were discussed with the patient and her family.   We discussed the opportunities for optimal medication management.   the patient is tolerating current medication regimen without side effect.   Is uncertain the patient is getting deep sleep.   the patient has not started Belsomra.   Recommend switching gabapentin 100 for Belsomra 5 mg for trial for sleep-related issues.   In addition the patient continues to have mild cyclothymic tendencies with mood swings bothersome to the patient and her family.   We discussed medication options in the past.   Recommended trial of low-dose Lamictal 25 mg. Side effects have been discussed with her family.   her family is advised to watch for rashes.   Will repeat LFTs and CBC at 1 and 3 months. Additional things discussed today include genetic testing.   her family would like to pursue genetic testing at this time.   We will screen for standard causes of FTLD with [facility] neurodegenerative panel.  Primary Care Provider (02/08/2022)  Type: Chart Review. S the patient is a 76 y. O. Female with history of atrial septal aneurysm, thoracic atherosclerosis, hypercholesterolemia, depression, dementia, relative bradycardia, vasodepressive type of vasovagal.  Specialist: cardiology (03/22/2022)  Type: Chart Review. S the patient is a 76 y. O. Female with history of atrial septal aneurysm, thoracic atherosclerosis, hypercholesterolemia, depression, dementia, relative bradycardia, vasodepressive type of vasovagal.  Hospitalization (06/25/2022)  Type: Chart Review. Last time seen, the patient was started on low-dose Lamictal. Gabapentin was continued 100 mg. Belsomra does not appear to have been started. Donepezil was continued 10 mg q. Day. Since starting Lamictal her family report improvement in cycling behavior. Though there has been a recent up taking last 1-2 weeks of anxiety her daughter  reports general improvement in her cyclothymic tendencies with variable highs of panic/ anxiety and confusion. Recommend increasing 25 mg once a day to twice a day following liver function tests which will be completed next 2-3 days. Other issues addressed today include longstanding insomnia. The patient is responsive to 4 mg melatonin extended release. her family does report however in recent weeks the patient has been exhibiting more REM sleep behavioral disorder. The patient has a long history of bruxism for which she is in the process of getting a mouth guard. However recently fit the patient has been noted to be talking her sleep more often. her family report minimum 1 episode in recent past where she woke up in a frantic state. her  thinks she was still sleep however was actively in distress and thought she saw things on the ground. Is not entirely Seroquel she is certain whether not these were hallucinations or REM sleep behavior disorder however her  who was present thinks she was still asleep. Review of previous medical records indicate the patient has been recently seen by Cardiology. The patient has a prior diagnosis of orthostatic hypotension. The patient is currently treated on midodrine. The patient there are no current side effects on midodrine. Otherwise the patient has improved compared to last time seen. Cycling behavior has not fully improved but however is going in the right direction. Sleep seems to be under control aside from bothersome REM sleep behavioral disorder. We discussed potential medications for controlling REM sleep behavior disorder which include clonazepam however given prior medication sensitivities recommend increasing Lamictal 1st before considering the addition of benzodiazepines. As patient's sleep is currently under control do not recommend switching the patient from melatonin to Belsomra at this time. Ramelteon was not covered by insurance. At the observations  and made above were discussed with the patient and her family. 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 The patient's clinical syndrome was initially masked by polypharmacy as we recommend monitoring for symptoms mentioned above. Now that we have tapered off previous medications, her clinical presentation is becoming more clear. Patient's presentation is becoming to look more akin to Lewy body dementia   Given neuroleptic sensitivity, REM sleep behavioral disorder, personal familial history of mood disorder, significant fluctuations in arousal/mood  however at this time the patient does not have overt parkinsonism nor vivid well-formed hallucinations. Recommend increasing Lamictal from 25 mg to 25 mg b. I. D. Following LFTs. Continue melatonin 4 mg. If needed will attempt Belsomra. Recommend avoiding anticholinergics and antihistamines. Repeat LFT/ CMP for increasing Lamictal. The patient continues to have bothersome worsening RBD and bruxism will consider the addition of clonazepam. Type: Chart Review. 76-year-old female with history of hypertension, hyperlipidemia, frontotemporal dementia presents to the emergency department for reported syncopal episode. her  reports he could see the patient standing at the bathroom sink then when he looked away he heard the patient fall, but did not witness it. Reports that she fell backwards and was unresponsive momentarily after the fall. No seizure-like activity that was witnessed. The patient sustained a wound to the back of her head, lower lip into her chin and broke her left upper veneer. The patient reports no significant pain at this time. She is not anticoagulated. Last tetanus vaccine 2014.  [facility] - Phill Villanueva MD. Neurologist (08/09/2022)  Type: Chart Review. 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 On 06/25/2022 the  patient had a syncopal episode likely secondary to orthostatic hypotension while standing at the bathroom sink. The patient fell backwards hitting her face break your teeth on the sink becoming unresponsive for a moment. CT head showed no acute intracranial trauma. Regardless following this event her family believes there has been a decompensation. As primary historian, the patient's daughter, has been away on vacation for the last month is unclear when patient's Behavior in sleep began to decompensate. Since last time seen patient's sleep has become more fragmented. She is frequently having REM sleep behavior disorder but frequently talking acting out dreams. She reports increasing daytime fatigue, worsening comprehension/confusion, worsening excoriation disorder, in increasing mood swings. We discussed medication alternatives for various issues. Recommend focusing on sleep by increasing melatonin to Belsomra. Recommend low risk strategies such as increasing and acetylcysteine for excoriation disorder. Given patient's worsening lightheadedness responsive to midodrine recommend increasing from 5 mg to 10 mg b. I. D. Over 3 weeks. We discussed patient's clinical presentation B-mode were consistent with Lewy body dementia at this time however frontal temporal atrophy still remains atypical for syndrome. We have discussed biomarker testing in the form of the Syn-One skin biopsy for alpha-syn. her family is interested. We we will provide reading material. At the observations and made above were discussed with the patient and her family. Following a recent ground level fall with head trauma patient's behavior and sleep have worsened. Previously the patient has mood lability and outbursts were modestly managed on Lamictal 25 mg BID. Currently the patient is behavioral outbursts have worsened. Patient's recent ground level fall seems to been provoked by orthostatic hypotension for which the patient is taking midodrine 5 mg  t. I. D. Recommend increase to 10 mg t. I. D. Slowly over 3 weeks. Regarding patient's insomnia recommend switching from melatonin to Belsomra 10 mg. For patient's worsening excoriation disorder, recommend starting NAC 1200mg and titrate slowly to 3000mg daily. The next appointment will consider increasing Lamictal for excoriations disorder and mood disorder. 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 We provided reading material.  [facility] - Phill Villanueva MD. Neurologist (08/23/2022)  Type: Chart Review. Last time seen patient's midodrine was increased to 10 mg b. I. D. Over 3 weeks. Offered Syn-One skin biopsy for alpha-syn and expanded genetic testing panel. Started and acetylcysteine and titrated up to 3000 mg. Started Belsomra 10 mg. During this appt discussed potentially increasing Lamictal. At the observations and made above were discussed with the patient and her family. Following a recent ground level fall with head trauma patient's behavior and sleep have worsened. Previously the patient has mood lability and outbursts were modestly managed on Lamictal 25 mg BID. Currently the patient is behavioral outbursts have worsened. Patient's recent ground level fall seems to been provoked by orthostatic hypotension for which the patient is taking midodrine 5 mg t. I. D. Recommend increase to 10 mg t. I. D. Slowly over 3 weeks. Regarding patient's insomnia recommend switching from melatonin to Belsomra 10 mg. For patient's worsening excoriation disorder, recommend starting NAC 1200mg and titrate slowly to 3000mg daily. The next appointment will consider increasing Lamictal for excoriations disorder and mood disorder. 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 We  provided reading material. Suvorexant (as well as other orexin inhibitors) is extremely effective, well-tolerated in those over 65 with minimal adverse reactions in clinical trials, and non-addictive. In clinical trials it was shown to improve total sleep time, sleep latency, waking after sleep onset, and quality of sleep, and the only adverse reaction that clearly  from placebo was morning grogginess (Suvorexant 7%, placebo 3%). If insurance requires a prior authorization, guidelines established by the American Academy of Sleep Medicine (AASM) should be sufficient justification for using Suvorexant. Per the AASM guidelines (Angella et al, J Clin Sleep Med, 2017;13(2):307-349), Suvorexant is the most reasonable choice for the treatment of the insomnia in those over 65, particularly in the setting of cognitive impairment. Benzodiazepines (e. G. Triazolam, Temazepam), Z drugs (e. G. Zolpidem, Eszopiclone), and anticholinergic medications (e. G. Doxepin) are not recommended to treat insomnia in those over 65 and are listed on the AGS Beers Criteria (AGS Beers Criteria Update Expert Panel, J Am Geriatr Soc, 2019;67(4):674-694). These medications increase the risk for falls and worsen cognition. Other medications, including Zaleplon and Ramelteon are only indicated for sleep-onset insomnia. Trazodone is not recommended by AASM for the treatment of insomnia.  [facility] - Phill Villanueva MD. Neurologist (08/30/2022)  Type: Chart Review. Last time seen patient's midodrine was increased to 10 mg b. I. D. Over 3 weeks. Offered Syn-One skin biopsy for alpha-syn and expanded genetic testing panel. Started and acetylcysteine and titrated up to 1000 mg. Started Belsomra 10 mg. During this appt discussed potentially increasing Lamictal. Since starting Belsomra her family reported improvement in sleep however continues to have mild fragmentation of sleep that is somewhat bothersome. The patient has REM sleep  behavior disorder seems to have improved with deeper sleep on Belsomra 10 mg. Recommend increasing to 20 mg per of her family his only titrated up and acetylcysteine to 1000 mg. Recommend increasing to 3000 mg over the next month before stopping medication entirely. In the last 3 weeks her family report return of patient's oral pharyngeal dyskinetic movements. There appear mild on presentation however are still present. The patient continues to have bother some mood fluctuations that are otherwise relatively stable. Given patient's increasing dyskinetic movements, skin picking, and mood swings recommend increasing Lamictal 25 mg b. I. D. To 50 mg b. I. D. Slowly over 1 month. If symptoms do not improve on increased dose of Lamictal may consider the addition of clonazepam for bruxism and REM sleep behavior disorder. At the observations and made above were discussed with the patient and her family. The patient has responded to Belsomra 10 mg however incompletely. Recommend increasing to 20 mg and maintaining CPAP compliance. The patient continues to have bothersome excoriation, picking, dyskinetic or pharyngeal movements, and mood disorder. Recommend increasing Lamictal to 50 mg b. I. D. May consider the addition of clonazepam at later date. 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 Will follow-up following titration of N-acetylcysteine as well as retrieval of results for extended [facility] genetic testing.  Emergency Room (01/27/2023)  Type: Chart Review. Christine Garcia is a 77 y. O. Female who presents to ED with left arm pain. The patient woke up from a nap prior to arrival. She complained of left arm pain. She reports no falls or injuries. Denies lying in her arm. Her  saw the patient, noted that she did not appear well, EMS was then called. her  states that the patient was getting out of bed, was holding herself up against the post of the bed. The patient reports no  arm pain at this time. She denies any chest pain. Denies any numbness or tingling to the arm. her family states she does have a history of orthostatic hypotension.  Electronic Medical Record (03/06/2023)  Type: Chart Review. I'm following up on a couple phone messages I've left regarding scheduling an appointment with you for my Mom. She has a visit to the hospital a few weeks ago and since seeing her primary, they recommended having a follow up with you as well. She's had labs done and having a cervical MRI and X-ray done today.     Current Presentation  Recent/Interim History:  Since last time seen the patient was briefly seen in the hospital which showed a cervical MRI and x-ray. Imaging was unrevealing. Since last time seen patient's symptoms have been relatively stable. She is continued to have episodes of vasovagal syncope evaluated by Cardiology. Ultrasounds have been stable. Since patient's ground level fall the patient has been reporting intermittent left arm disturbances. Initially thought to be weakness evaluated for TIA however symptoms actually were painful in nature. MRI C-spine reviewed show evidence of C3/C4 radiculopathy which would explain patient's sensory disturbance in the left arm. her family reports the patient has been reporting intermittent left arm pain and headaches likely related to cervical stenosis and radiculopathy however is no evidence of cord impingement to explain lower extremity symptoms. We discussed patient's clinical symptoms as related to her change in behavior over the last month. Last month the patient has been having increasing spells of confusion superimposed with what appears to be increase in anxiety related to unclear pain. The patient has a hard time localizing her understanding for pain at this time requiring additional care support. We recommend addressing pain as this likely is the source of her anxiety for which he is unable to localize symptoms. Recommend starting  PT with massage therapy of neck. We discussed traction exercises. Recommend starting scheduled Tylenol p. R. N. Ibuprofen. May increase with gabapentin p. R. N. For radiculopathy. 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 Recommend avoiding as much as possible tizanidine Flexeril as it will likely make confusion worse. The family also reported evolution of bruxism. The patient is no longer granular teeth however she is biting down. We discussed mouth guards and other therapies to this effect. The patient is tolerating Lamictal well 50 mg twice a day and N-acetylcysteine 3 tablets daily. If necessary may increase dose. The patient also reports intermittent lightheadedness. The patient has been responsive to midodrine 5 mg periods unclear if this over treatment as in hospital patient's systolic was chronically elevated however at home and recent doctor's appointments has been well within normal limits.  Unresolved Concern(s) reported by patient/family:  Insomnia - improving with Belsomra, complicated by CPAP noncompliance  Anxiety/cycling mood - improved on low dose lamictal  RBD/Bruxism - hesitant to start clonazepam however will consider if SXS get worse     Review of cognitive, visuospatial, motor, sensory, and behavioral systems:     Memory:   The patient's memory has worsened in the past few years.  She does repeat statements or asks the same question repeatedly.  She does have difficulty remembering recent important conversations.  She does have difficulty remembering recent events.  She does forget information within minutes.  Her recent retrograde memory is impaired.  Her remote memory is intact.  Attention:   The patient's attention and concentration are impaired.  She does not have attentional fluctuations.  She does have difficulty with selective attention.  She does become  easily distracted.  She does have difficulty with divided attention.  Executive:   The patient's cognitive processing speed is slower.  She does have difficulty with working memory.  She does misplace personal items (e.g., keys, cell phone, wallet) more frequently.  She does have difficulty keeping track of her medications.  She does have difficulty with planning/organizing/completing multistep tasks.  She does have difficulty with executive attention.  She does not have difficulty with flexible thinking.  She does not difficulty with self control.  She is exhibiting new symptoms that suggest they have become more impulsive, rash and/or careless.  Her judgment is impaired.  Language:   The patient's speech is not affected.  She does forget people's names more frequently.  She does not have word-finding difficulties.  Her speech is fluent and non-effortful.  Her speech is grammatically intact.  She does not make word substitutions.  She does not have difficulty reading.  She does not appear to have impaired comprehension.  Visuospatial:   The patient does not have new visuospatial difficulty.  She has become confused or disoriented in *new*, unfamiliar places.  She does have trouble navigating.  She does not get lost in familiar places.  She does have visuospatial disorientation.  She does not have difficulty recognizing objects or faces.  Motor/Coordination:   The patient does not have difficulty with walking.  She does not feel imbalanced.  She denies having fallen.  She does not appear to have new muscle weakness.  She does have difficulty buttoning shirts, operating zippers, or manipulating tools/utensils.  Her handwriting has not become micrographic.  She does not have a resting tremor.  She denies having any new involuntary movements and/or muscle jerking.  She does not have swallowing difficulty.  She denies new muscle cramps and twitching.  Sensory:   The patient denies new numbness, tingling, paresthesias,  or pain.  The patient denies a loss of vision, blurry vision, or double vision.  The patient denies new loss of hearing or worsening tinnitus.  The patient does have anosmia.  Sleep:   The patient reports difficulty sleeping. Comment: 4/2020: She sleeps well. 4/2021: She uses CPAP to treat obstructive sleep apnea (since fall 2020), but this is inconsistent. Her  said she has conversations in her sleep or will seem to be awake but still be sleeping. No significant motor movements in sleep.  The patient does have difficulty going to sleep.  The patient reports difficulty staying asleep and/or frequently awakening at night.  The patient does snore and/or have witnessed apneas while sleeping. Comment: She uses CPAP to treat obstructive sleep apnea (since fall 2020), but this is inconsistent.  When she wakes up in the morning, she does feel well-rested.  She has been reported to have dream-enactment behavior. Comment: Her  said she has conversations in her sleep or will seem to be awake but still be sleeping. No significant motor movements in sleep.  She denies symptoms suggestive of restless leg syndrome.  Behavior:   The patient's personality has changed.  She does have symptoms of disinhibition and social inappropriateness.  She is exhibiting symptoms to suggest a loss of manners and/or decorum.  She does not have apathy and/or decreased motivation. Comment: 4/2020: Her daughter noted reduced engagement in pleasurable activities. 4/2021: Significant, per daughter. She mostly sits in her chair and stares. Ms. Garcia said she does needlepoint, but her daughter said this is not consistent. She was prescribed Ritalin.  She does appear to have had a change in behavioral/emotional inertia.  The patient's emotional expression has changed.  She does have emotional blunting or lability.  She does have symptoms of irritability and mood lability. Comment: 4/2020: Her daughter noted some irritability due to stress.  4/2021: Irritability with her  in the context of caregiving.  She has been reported to have new symptoms of agitation, aggression, or violent outbursts. Comment: 4/2020: Resistance to taking medication but will accept feedback and support from family without defensiveness. 4/2021: None reported.  Her insight into his disease and situation is impaired.  Her personal hygiene is intact.  She is exhibiting a diminished response to other people's needs and feelings. Comment: patient has consistently demonstrated decrease in appetite and response to both her  and others. Patient has been hardly a necessarily mean to her  who has recently had a stroke and has become physically disabled following this.  She is exhibiting diminished social interest, interrelatedness, and/or personal warmth.  She does appear restless.  She denies new and/or worsening simple repetitive behaviors.  Her speech has not become simplified or become repetitive/stereotyped.  She reports symptoms that are suggestive of new/worsening complex repetitive/ritualistic compulsions and behaviors.  She does not have symptoms of hyper-religiosity or dogmatism.  Her interests/pleasures have not become restrictive, simplified, interrupting, or repetitive.  She is exhibiting symptoms to suggest an increase in self-stimulating behavior. Comment: 4/2021: Picking behaviors, which progressed beyond her face to her back and chest despite changes in medications. Triple-checking door locks at night.  She has had changes in eating behavior. Comment: Patient is engaged in freeing frequent binge eating often getting 10-20 lb a time losing it however patient's of food patterns have not otherwise become for strict it. Per family she has always had a sweet tooth however the cycling and range of this behavior has increased  She has been exhibited symptoms to suggest increased consumption of food and/or alcohol/cigarettes.  She denies oral exploration or  consumption of inedible objects.  Psychiatric:   She does feel depressed. Comment: 2020: Ms. Garcia said she feels sad at times. Her daughter said she has noticed depressed mood and lack of self care. Her daughter indicated friends and medical providers have noticed changes. 2021: Improvement in depressed mood with medication but tearful at times without a clear cause. Her daughter said she is more timid and unsure.  She is exhibiting symptoms of social withdrawal/indifference.  She does have anxiety. Comment: 2020: None reported. 2021: Significant anxiety, at times (e.g., riding in car, around the house, going to an art class), but no anxiety at other times.  She does exhibit cycling behavior.  She does exhibit hyperactive behavior.  She is not exhibited symptoms of paranoia.  She does have delusions.  She does have hallucinations. Comment: 2020: None reported 2021: After her ankle fracture, she saw snakes on the floor of the bedroom at night and during the day. She was not taking pain medication at the time. Around , she saw and communicated with two little children in her courtyard. One evening in December, she saw and talked with their  dog, who she saw in a tree outside the window.  She does have a history of sensitivity to neuroleptic/psychotropic medications.  Medical Review of Systems:   The patient does not have constipation.  The patient does have urinary incontinence. Comment: urinary incontinence/urge as of 2021  The patient reports symptoms that are suggestive of orthostatic lightheadedness.  The patient's weight is unstable. Comment: 2020: Her daughter reported changes in diet. She eats less when depressed but eats sweets and coke. Her daughter is doing the grocery shopping. 2021: Her daughter has noticed some weight loss  Functional status:  Difficulty performing the following Instrumental ADLs:  Household chores: Yes  Food Preparation: Yes  Shopping:  Yes  Ability to Handle Finances: Yes  Transportation/Driving: Yes  Household Appliances/Stove: Yes  Laundry: Yes  Difficulty performing the following Basic ADLs:  Dressing: No  Bathing: No  Toileting: No  Personal hygiene and grooming: No  Feeding: No  Care Management:  Patient/Family Safety Concerns:  Medication Adherence: Yes  Home Safety: No  Wandered: No  Firearms: No  Fall Risk: No  Home Alone: No          Past Medical History:   Diagnosis Date    Alzheimer's disease     Anxiety     Depression     Dyslipidemia 10/02/2015    Fall 2020    with fracture    Hyperlipidemia     Hypertension     minimal    Migraines     Obesity 10/02/2015    Paranoia     Patent foramen ovale 10/02/2015       Past Surgical History:   Procedure Laterality Date    COLONOSCOPY N/A 2017    Procedure: COLONOSCOPY;  Surgeon: Gerber Naylor MD;  Location: [facility] ENDO;  Service: Endoscopy;  Laterality: N/A;    HYSTERECTOMY      KNEE ARTHROPLASTY Bilateral 2020    meniscus repair    left elbow      left knee      OOPHORECTOMY      OPEN REDUCTION AND INTERNAL FIXATION (ORIF) OF INJURY OF ANKLE Right 11/3/2020    Procedure: ORIF, ANKLE;  Surgeon: Ted Rome MD;  Location: [facility] OR;  Service: Orthopedics;  Laterality: Right;       Family History   Problem Relation Age of Onset    Diabetes Father     Heart disease Father     COPD Father     Lung cancer Brother     Cancer Sister        Social History     Socioeconomic History    Marital status:      Spouse name: josé   Tobacco Use    Smoking status: Former     Packs/day: 1.00     Years: 20.00     Pack years: 20.00     Types: Cigarettes     Quit date:      Years since quittin.2    Smokeless tobacco: Never    Tobacco comments:     1-2ppd   Substance and Sexual Activity    Alcohol use: Yes     Alcohol/week: 1.0 standard drink     Types: 1 Shots of liquor per week     Comment: occ    Drug use: Never    Sexual activity: Yes     Partners: Male     Birth  control/protection: Post-menopausal, None     Social Determinants of Health     Financial Resource Strain: Low Risk     Difficulty of Paying Living Expenses: Not very hard   Food Insecurity: No Food Insecurity    Worried About Running Out of Food in the Last Year: Never true    Ran Out of Food in the Last Year: Never true   Transportation Needs: No Transportation Needs    Lack of Transportation (Medical): No    Lack of Transportation (Non-Medical): No   Physical Activity: Inactive    Days of Exercise per Week: 0 days    Minutes of Exercise per Session: 0 min   Stress: Stress Concern Present    Feeling of Stress : Very much   Social Connections: Unknown    Frequency of Communication with Friends and Family: More than three times a week    Frequency of Social Gatherings with Friends and Family: More than three times a week    Active Member of Clubs or Organizations: No    Attends Club or Organization Meetings: Never    Marital Status:    Housing Stability: Low Risk     Unable to Pay for Housing in the Last Year: No    Number of Places Lived in the Last Year: 1    Unstable Housing in the Last Year: No       Medication:     Current Outpatient Medications on File Prior to Visit   Medication Sig Dispense Refill    acetylcysteine, bulk, Powd TAKE 2 TABLETS (1200MG) DAILY FOR 3 WEEKS, THEN TAKE 4 TABLETS DAILY 120 g 1    ALPRAZolam (XANAX) 0.5 MG tablet Take 1 pill 30 minutes prior to procedure and may repeat if necessary. 2 tablet 0    donepeziL (ARICEPT) 10 MG tablet TAKE 1 TABLET BY MOUTH EVERY DAY IN THE EVENING 90 tablet 3    gabapentin (NEURONTIN) 100 MG capsule Take 1 capsule (100 mg total) by mouth every evening. 30 capsule 3    lamoTRIgine (LAMICTAL) 25 MG tablet TAKE 2 TABLETS BY MOUTH TWICE A  tablet 1    midodrine (PROAMATINE) 5 MG Tab TAKE 2 TABLETS (10 MG TOTAL) BY MOUTH 3 (THREE) TIMES DAILY WITH MEALS. 540 tablet 1    rosuvastatin (CRESTOR) 20 MG tablet Take 1 tablet (20 mg total) by mouth once  daily. 90 tablet 3    simethicone (GAS-X MAXIMUM STRENGTH ORAL) 2 (two) times a day.       suvorexant (BELSOMRA) 20 mg Tab Take 1 tablet by mouth every evening. 90 tablet 3    UNABLE TO FIND       UNABLE TO FIND       UNABLE TO FIND       UNABLE TO FIND       UNABLE TO FIND       [DISCONTINUED] aspirin (ECOTRIN) 81 MG EC tablet Take 1 tablet (81 mg total) by mouth once daily. (Patient not taking: Reported on 3/8/2023) 90 tablet 3     No current facility-administered medications on file prior to visit.        Review of patient's allergies indicates:   Allergen Reactions    Sucrose Other (See Comments)     GI issues -       Medications Reconciliation:   I have reconciled the patient's home medications and discharge medications with the patient/family. I have updated all changes.  Refer to After-Visit Medication List.    Objective:  Vital Signs:  There were no vitals filed for this visit.  Wt Readings from Last 3 Encounters:   03/08/23 1345 76.3 kg (168 lb 3.2 oz)   03/07/23 1532 75.9 kg (167 lb 6.4 oz)   02/02/23 1549 78 kg (172 lb)     There is no height or weight on file to calculate BMI.           Neurological examination:  Mental Status:   Her appearance was abnormal.   Comment: looks older than stated age;  Throughout the interview, she behaved abnormally and was not cooperative.   Comment: refuses complete evaluation;  Her behavior was inappropriate to the clinical context and situation.   Comment: child-like;  The patient's energy level is abnormal.   Comment: Fluctuating;  Her orientation is not entirely accurate.  Her attention/concentration is impaired.  She is unable to complete three-step commands without making errors.  Her fund of knowledge was less than what would be expected given age, culture and level of education.  Her thought process is not logical or goal-oriented.   Comment: circumstantial, bradyphrenia, perseveration, obsessive;  She demonstrated impaired insight based on actions, awareness of  "her illness, plans for the future.  She demonstrated impaired judgment based on actions and plans for the future.  She has evidence of hallucinations.  She has evidence of delusions.  Cranial Nerves:   Her pupils were normal.  Her elemental visual acuity assessment demonstrated normal OS vision and normal OD vision.  Her visual fields were full to confrontation in all quadrants.  Assessment of extinction with double/simultaneous stimuli was negative in her bilateral visual fields.  Her ocular pursuit was impaired.   Comment: decrease horizontal tracking;  Her saccades were abnormal.   Comment: decreased initiation;  Her eyelid assessment showed no apraxia. There was no eyelid dysfunction, retraction, or somers sign.  Her blink rate was abnormal.   Comment: decreased;  Her facial strength was normal.  Her facial expression was symmetric and appropriate to the context.  She does not require the use of hearing aids.  Her tongue showed no evidence of scalloping.  She tongue movement with normal.  She had no significant evidence of anterocollis or retrocollis.  Speech/Language:   The patient's speech was not completely fluent and non-effortful.  Her speech timbre is abnormal.   Comment: quiet;  Her speech rate is normal.  Her respirations are within normal range and appropriate to context.  Her speech timbre is normal.  She has no articulation (segmental features) errors.  She has no speech dysdiadochokinesia with repetition of syllables such as "/PA/, /TA/, /KA/, /OM/".  She made no errors during the repetition of rapid syllables and or words such as "caterpillar" "", and "huckleberry"  She has no repetition errors of rapid sequences of consonants, such as in "Mosque Alevism" or "[employer]".  She made prosody (suprasegmental features) errors.  Her stress assessment showed no repetition errors in linguistically complex words, including multisyllabic words ("planetarium," "questionable," " ""accomplishment," "phonetic.  Her tone was emotionally limited, and her tempo was arrhythmic.   Comment: monotone;  The patient's speech is not dysarthric.  The patient's speech was without evidence of anomia.  She showed no evidence of anomia during spontaneous speech.  She showed no evidence of anomia during confrontational naming; 12/12 (correct chocolate bar, kangaroo, theater, Mormon, doctor, potato, battery, ice cube tray, thermometer, flower, bomb, and calendar).  She makes no phonological loop errors.  She makes no errors during the repetition of gibberish words (e.g., "Supercalifragilisticexpialidocious," "Pigglywiggly," "Woospiedoo," "Zowzy," "Bazinga").  She makes no errors during the repetition of complex meaningless phrases (e.g., "The horse raced past the barn fell.", "The complex houses  and single soldiers and their families," "Wishes are hopping, and trees are west," and "Brushing liked to [facility] cheikh's direction").  She can comprehend commands that cross the midline (e.g., with your left thumb, touch your right ear).  She can comprehend commands that depend on syntax (e.g., point to the ceiling after you point to the floor).  Her speech is grammatically intact; (no function/semantic word substitutions, phonemic/semantic paraphasias, or binary confusion).  Motor:   Assessment of motor strength was symmetric and at minimal anti-gravity.  Deltoid L +5/5 R +5/5 Biceps L +5/5 R +5/5 Triceps L +5/5 R +5/5 Wrist extension L +5/5 R +5/5 Finger abduction L +5/5 R +5/5 Hip flexion L +5/5 R +5/5 Hip extension L +5/5 R +5/5 Knee flexion L +5/5 R +5/5 Knee extension L +5/5 R +5/5 Ankle flexion L +5/5 R +5/5 Ankle extension L +5/5 R +5/5  There is no pronator or downward drift.  There is no upward drift.  There is no outward/diagonal drift.  There is no myoclonus observed in The patient's bilateral upper and lower extremities.  There are no fasciculations observed in The patient's bilateral upper and lower " extremities.  Coordination:   She showed evidence of upper extremity limb dysmetria during past pointing on finger-nose-finger.  She has no bilateral lower extremity limb dysmetria during shin rub.  She has no limb dysdiadochokinesia of the upper extremity on the pronation/supination test and screwing in a light bulb or lower extremity during tapping ball of each foot bilaterally.  She has no cerebellar rebound bilaterally.  She has no visible tremor.  She has no kinetic tremor bilaterally.  She has no postural tremor bilaterally.  She has no resting tremor bilaterally.  She has evidence of interhemispheric motor control deficits.  She demonstrates evidence of motor overflow.   Comment: right to left, left to right;  She demonstrates alien limb phenomena.  She has dyskinetic movements.   Comment: on presentation patient has mild oral facial dyskinesias improved compared to baseline 3 months prior; on presentation patient has mild oral facial dyskinesias improved compared to baseline 3 months prior  She has no athetosis.  She has no choreiform movements.  She has no ballistic movements.  She has evidence of akathisia.   Comment: mild significant improvement compared to last time seen; mild significant improvement compared to last time seen  She has evidence of tardive dyskinesia.   Comment: involuntary blinking, unintentional/uncontrollable limb movements; mild significant improvement compared to last time seen  The patient's upper extremity fine motor coordination was abnormal.   Comment: B/L, R>L, Mild;  The patient's bilateral upper extremity coordination with finger tapping, pronation/supination, and the open-close fist showed slowing.  The patient's upper extremity fine motor coordination was not hypometric.   Comment: B/L, R>L, Mild; finger tapping, pronation/supination, and the open-close fist showed hypometria.  The patient's upper extremity fine motor coordination was not dysrhythmic.   Comment: B/L, R>L,  Mild; finger tapping, pronation/supination, and the open-close fist showed dysrhythmia.  Higher Cortical Function:   The patient showed no evidence of simultanagnosia (Navon hierarchical letters).  She demonstrates no evidence of dorsal simultanagnosia (overlapping objects).  She demonstrates no evidence of ventral simultanagnosia (complex picture synthesis).  The patient showed evidence of visuospatial constructional dysfunction.  The patient showed no evidence of agnosia.  The patient showed evidence of apraxia.  She showed no evidence of ideomotor apraxia performing tool-use pantomimes (e.g., use a hammer, use a screwdriver, use a comb, flip a coin, waving goodbye).  She showed no evidence of ideational apraxia (e.g., taking off and putting on shoes, folding paper into an envelope).  She showed evidence of limb-motor apraxia during mimicking complex bimanual hand shapes.  She showed no evidence of buccofacial apraxia (e.g., blow out a candle, puff out cheeks, and whistle).  She showed dysexecutive behavior.  She showed no utilization or imitation behavior.  She has evidence of perseverative or stereotyped behavior.  She has no stimulus-bound behavior.    Neuropsychological Evaluation Summary:  Prior Neurocognitive/Neurobehavioral Evaluation(s)  Informal Neuropsych testing in December 2020  MoCA = 24/30  Informal Neuropsych testing in 3/17/21  Mini Mental Status Exam [MMSE] = 26/30  MoCA = 24/30  Informal Neuropsych testing in 4/2/21  Brijesh Cognitive Assessment [MoCA] = 20/22).  She had difficulty with auditory attention and delayed recall (2/5 words, 3/3 words with cueing).  Formal Neuropsych testing in 4/12/21  Ms. Garcia completed a neuropsychological evaluation in June 2020. Tests administered included: MoCA; MSVT; Test of Premorbid Functioning; Wechsler Adult Intelligence Scale, Fourth Edition (WAIS-IV) selected subtests; Wechsler Memory Scale, Fourth Edition (WMS-IV), selected subtests;  Neuropsychological Assessment Battery (NAB), selected subtests; Verbal fluency tests (FAS & animal naming; Lu et al., 2004 norms); Ellsworth Verbal Learning Test, Revised (HVLT-R; Form 1); [place] Making Test, parts A and B (Lu et al., 2004 norms); [place] Card Sorting Test-128 (WCST- 128; computer version), Oscar Complex Figure Test, Geriatric Depression Scale (GDS-30); Lees Anxiety Inventory (TABATHA).  Ms. Garcia's performance on neuropsychological testing suggested deficits in areas including executive functioning (with impact on aspects of visuospatial functioning), learning and retention of information, and attention and working memory. Language, processing speed, and aspects of visuospatial functioning were largely consistent with premorbid estimates, taking into  deficits. Comparisons to prior testing suggested declines across most areas that were compared. Testing did not strongly indicate a pattern that would suggest Alzheimer's dementia, as recognition memory and language were intact. Taken together, reports of cognitive, neuropsychiatric, physical, and functional change suggest that a diagnosis of dementia with Lewy bodies should be considered in the differential. Although her symptom picture and test patterns are not entirely consistent, the constellation of symptoms is most suggestive of this etiology at a mild to moderate level, with behavioral disturbance. Visuospatial functioning was also largely within normal limits, with relative reduction on a complex figure drawing task  2022-08-09:  Executive predominant multidomain major cognitive impairment   BEHAV5+ 5/6: See ROS section for a full description  Neurocognitive/Neurobehavioral Evaluation completed on 2023-03-28    Neuropsychiatric/Behavioral Focused Evaluation Assessment    BEHAV5+ 5/6 See ROS section for a full description   Laboratories:     Lab Date Value [Reference]   Autoimmune/Paraneoplastic Screening           GIANNI  Screen 2021, Sep-20  2021, Sep-20  2021, Sep-20    None Detected [None Detected]  None Detected [None Detected]  None Detected [None Detected]      Sed Rate 2021, Sep-20  2021, Sep-20  2021, Sep-20    7 [0 - 29 mm/Hr]  7 [0 - 29 mm/Hr]  7 [0 - 29 mm/Hr]      Metabolic Screening   Hemoglobin A1C External 09/20/2021  5.2 [4.0 - 5.6 %]  5.2 [4.0 - 5.6 %]  5.2 [4.0 - 5.6 %]      Homocysteine 06/13/2020  5.0 [4.0 - 15.5 umol/L]  5.0 [4.0 - 15.5 umol/L]  5.0 [4.0 - 15.5 umol/L]      TSH 04/18/20202020, Apr-18    1.500 [0.40 - 4.00 uIU/mL]  1.760 [0.40 - 4.00 uIU/mL]      Glucose 2021, Apr-28    105 [70 - 110 mg/dL]      Albumin 2021, Apr-28 2021, Apr-28 2021, Jan-27    4.2 [3.5 - 5.2 g/dL]  4.2 [3.5 - 5.2 g/dL]  4.6 [3.5 - 5.2 g/dL]      Alkaline Phosphatase 2021, Apr-28 2021, Apr-28 2021, Jan-27    108 [38 - 145 U/L]  108 [38 - 145 U/L]  106 [38 - 145 U/L]      ALT 2021, Apr-28 2021, Apr-28 2021, Jan-27    25 [0 - 35 U/L]  25 [0 - 35 U/L]  26 [0 - 35 U/L]      AST 2021, Apr-28 2021, Apr-28 2021, Jan-27    32 [14 - 36 U/L]  32 [14 - 36 U/L]  37 (H) [14 - 36 U/L]      BILIRUBIN TOTAL 2021, Apr-28 2021, Apr-28 2021, Jan-27    0.9 [0.2 - 1.3 mg/dL]  0.9 [0.2 - 1.3 mg/dL]  0.9 [0.2 - 1.3 mg/dL]      PROTEIN TOTAL 2021, Apr-28 2021, Apr-28 2021, Jan-27    7.0 [6.0 - 8.4 g/dL]  7.0 [6.0 - 8.4 g/dL]  7.7 [6.0 - 8.4 g/dL]      Cholesterol 2021, Oct-28    177 [120 - 199 mg/dL]      HDL 2021, Oct-28    51 [40 - 75 mg/dL]      Non-HDL Cholesterol 2021, Oct-28    126 [mg/dL]      Triglycerides 04/18/2020  281 (H) [30 - 150 mg/dL]      Folate 04/18/2020  >20.0 [4.0 - 24.0 ng/mL]      Thiamine 2020, Apr-18    142 [70 - 180 nmol/L]      Vit D, 25-Hydroxy 04/18/2020  84 [30 - 96 ng/mL]  84 [30 - 96 ng/mL]  84 [30 - 96 ng/mL]      Vitamin B-12 2020, Apr-18    798 [210 - 950 pg/mL]      Vitamin B2 2020, Apr-18    15 [5 - 50 nmol/L]      Vitamin B6 2020, Jun-13 2020, Apr-18    397.3 (H) [20.0 - 125.0 nmol/L]  300.6 (H) [20.0 -  125.0 nmol/L]      Infectious Disease/Immunocompromised Screening   SARS-CoV-2 RNA, Amplification, Qual 2020, Nov-02    Negative      Standard Hematology Screen   Hematocrit 2021, Jan-27    44.4 [37.0 - 48.5 %]      Hemoglobin 2021, Jan-27    14.9 [12.0 - 16.0 g/dL]      MCV 2021, Jan-27    88 [82 - 98 fL]      Platelets 2021, Jan-27    261 [150 - 350 K/uL]      Neuroendocrine/Electrolyte Screening   BUN 2021, Apr-28    20 (H) [7 - 18 mg/dL]      Chloride 2021, Apr-28    106 [95 - 110 mmol/L]      Creatinine 2021, Apr-28    0.84 [0.5 - 1.4 mg/dL]      Potassium 2021, Apr-28    4.6 [3.5 - 5.1 mmol/L]      Sodium 2021, Apr-28    142 [136 - 145 mmol/L]      Delirium Screening   Bacteria, UA 2021, Dec-10    Few (A) [Negative /hpf]      Glucose, UA 2021, Dec-10    Negative      Ketones, UA 2021, Dec-10    Negative      Leukocytes, UA 2021, Dec-10    Negative      NITRITE UA 2021, Dec-10    Negative      Protein, UA 2021, Dec-10    Negative      RBC, UA 2021, Dec-10    0 [0 - 4 /hpf]      WBC, UA 2021, Dec-10    1 [0 - 5 /hpf]           Neuroimaging:    Brain Fluorodeoxyglucose-positron emission tomography on 8/20/21  Technique: Following injection of 11.1 mCi of F18 FDG, tomographic emission images were acquired of the brain with a dedicated full ring PET/CT fusion scanner. Non-contrast enhanced CT images were obtained as a transmission scan and for anatomic localization. Blood glucose level: 94 mg/dl. Uptake time: 1 hour 3 minutes. As per PERCIST criteria, SUVlbw is reported. An individualized dose optimization technique, automated exposure control, was utilized for the performed procedure.  Formal interpretation by Radiology:  Hypometabolism predominantly within the frontal lobes, and to a lesser degree within the left temporal and parietal lobes. 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  Independently reviewed radiological imaging by Phill Pablo MD. MPH. Behavioral Neurologist  Impression: : Though there  is visual evidence of decreased metabolism in the left frontal region, there is largely stable asymmetry of the high dorsal left prefrontal region since 2015 which will accordingly skew the relative illuminance of the region    Brain Dopamine transporter SPECT imaging on 8/27/2021  Technique: Following administration of thyroid blocking agent, patient received intravenous dose of 6.95 mCi of I123 Ioflupane. After 3 hour delay, SPECT imaging of the brain is performed.  Formal interpretation by Radiology:  Activity is absent within the left putamen and caudate nucleus. There is also diminished activity in the right putamen. IMPRESSION: ABNORMAL EXAMINATION WITH EVIDENCE FOR STRIATAL DOPAMINERGIC NEURODEGENERATION.  Independently reviewed radiological imaging by Phill Pablo MD. MPH. Behavioral Neurologist  Impression: : there is clear evidence of asymmetric left greater than right decreased dopaminergic reuptake strongly suggesting at minimum degeneration of the dopaminergic network    MRI brain/head with and without contrast on 5/22/2018  Technique: Multiplanar, multisequence pre and post-contrast MRI of the brain and IAC's was performed.  Comparison: None  Formal interpretation by Radiology:  Age-appropriate generalized involutional changes are seen along with findings most compatible with mild chronic microvascular ischemic changes.  Independently reviewed radiological imaging by Phill Pablo MD. MPH. Behavioral Neurologist  T1: No significant cortical atrophy pattern further testing to be a mild asymmetry in the left greater than right dorsal lateral prefrontal cortex  T2/FLAIR: scattered punctuated subcortical hyperintensity lesions consistent with microvascular ischemic changes were right greater than left  DWI/ADC: No Significant DWI hyperintensities/hypointensities. No ADC correlation.  SWI/GRE: No Significant hypointensities to suggest cortical/subcortical hemosiderin deposition.  Impression: :  largely normal MRI brain with scattered microvascular changes most predominantly in the right basal ganglia    MRI brain/head without contrast on 1/27/21  Technique: Multiplanar multisequence MR imaging of the brain was performed without the administration of intravenous contrast.  Comparison: CT head 01/27/2021, MRI brain 05/29/2018  Formal interpretation by Radiology:  Age-appropriate generalized cerebral volume loss with findings suggestive of mild chronic microvascular ischemic change.  Independently reviewed radiological imaging by Phill Pablo MD. MPH. Behavioral Neurologist  T1: Generalized cortical atrophy; mild left greater than right frontal greater than posterior. small cavum septum pellucidum. very mild right greater than left hippocampal atrophy. collectively atrophy pattern seems to be slightly increased compared to 2018  T2/FLAIR: mild bilateral scattered punctuated lesions consistent with hypertensive microvascular ischemic changes. Mild periventricular capping right greater than left. Two areas of focal cavitation in the corona radiata adjacent to the right basal ganglia.  DWI/ADC: No Significant DWI hyperintensities/hypointensities. No ADC correlation.  SWI/GRE: No Significant hypointensities to suggest cortical/subcortical hemosiderin deposition.  Impression: : mild generalized cortical atrophy slightly pronounced in the dorsal lateral prefrontal cortex with some scattered right greater than left subcortical microvascular changes consistent with hypertensive strokes. microvascular changes seem to be largely stable compared to 2018 however atrophy pattern seems to be slightly pronounced.     Procedures:    Electroencephalogram on 1/27/2021  Formal interpretation:  This is a normal EEG in an awake and drowsy adult. No potentially epileptiform activity was seen. Please be aware that a normal EEG does not exclude the possibility of an underlying seizure disorder.    Independently reviewed  Electroencephalogram by Phill Pablo MD. MPH. Behavioral Neurologist  Impression: : normal    Electrocardiogram on 1/27/21  Formal interpretation:  Vent. Rate : 048 BPM     Atrial Rate : 048 BPM    P-R Int : 182 ms          QRS Dur : 080 ms     QT Int : 450 ms       P-R-T Axes : 049 -21 024 degrees    QTc Int : 402 ms Sinus bradycardia Anterolateral infarct ,age undetermined Abnormal ECG  Independently reviewed Electrocardiogram by Phill Pablo MD. MPH. Behavioral Neurologist  Impression: : Received ECG has mild evidence of sinus node disease. HR (>=50-60) 48 BPM. Prolonged HI interval (>0.22 s). Broad QRS complex (> 0.12 s).    Echocardiography on 2/5/21  Formal interpretation:  Concentric hypertrophy and normal systolic function. The estimated ejection fraction is 60% Grade I left ventricular diastolic dysfunction. Normal right ventricular size with normal right ventricular systolic function. Mild tricuspid regurgitation. Normal central venous pressure (3 mmHg). The estimated PA systolic pressure is 29 mmHg.  Independently reviewed Echocardiography by Phill Pablo MD. MPH. Behavioral Neurologist  Impression: : Normal     Clinical Summary:     The patient is a 77-year-old right-handed female with a relevant past medical history of Metabolic syndrome ( hypertension, hyperlipidemia, obesity), migraine headaches, MDD, ERENDIRA on CPAP, who presents reporting a 8-year history of gradually progressive neurocognitive impairment.       The clinical history is suggestive of:  Memory Impairment: STM encoding impairment, LTM encoding-retrieval impairment, Amnesia of fixation  Attention Impairment: Attention, Selective attention, Sustained attention, Shifting attention  Executive Impairment: Energization, Working Memory, Set-Shifting, Response Inhibition  Visuospatial Impairment: Allocentric Spatial Processing  Motor/Coordination Impairment: Sensory motor integration  Sensory Impairment: Limbic  Dysfunction  Behavior Impairment: Neurovegetative, Emotional Regulation, Self-Preservation Dysregulation, Social Coherence, Sensorimotor Dysregulation, Stimulation Dysregulation  Psychiatric Impairment: Signal-Noise Dysregulation, Mood Regulation, Fixed-False Beliefs, Hallucinations, Neuroleptic Sensitivity  Medical Review of Systems Impairment: Autonomic Dysfunction  iADL Impairment: Cesar Instrumental Activities of Daily Living Scale  The neurological examination is significant for:  Cerebellar Dysfunction: dysmetria UE  Cortical Frontal Dysfunction: non-fluent aphasia (fluency)  Cortical Frontal-Parietal Disconnection: apraxia (limb-motor)  Cortical Transcallosal Disconnection: interhemispheric motor control (interhemispheric motor control ), motor efference (motor overflow, alien limb)  Executive Impairment: serial processing, thought disorder, thought disorder, judgment, dysexecutive behavior (perseverative/stereotyped)  Movement Disorder (Hyperkinetic): dyskinetic movements, akathisia, tardive dyskinesia  Movement Disorder (Hypokinetic): parkinsonism (tone, bradykinesia), dyskinesia (hypometria, dysrhythmia)  Movement Disorder (Ocular): abnormal ocular movement (pursuit, abnormal saccades), eyelid apraxia  Movement Disorder (Speech): abnormal vocal features (volume, prosody), AOS (tone)  Sensory Dysfunction: peripheral (A? fibers)  The neurocognitive battery is significant (based on age and education) for:  Executive predominant multidomain major cognitive impairment   BEHAV5+ 5/6: See ROS section for a full description  The neurologically relevant imaging is significant for  Brain Fluorodeoxyglucose-positron emission tomography (8/20/21): Though there is visual evidence of decreased metabolism in the left frontal region, there is largely stable asymmetry of the high dorsal left prefrontal region since 2015 which will accordingly skew the relative illuminance of the region  Brain Dopamine transporter SPECT  imaging (8/27/2021): there is clear evidence of asymmetric left greater than right decreased dopaminergic reuptake strongly suggesting at minimum degeneration of the dopaminergic network  MRI brain/head with and without contrast (5/22/2018): largely normal MRI brain with scattered microvascular changes most predominantly in the right basal ganglia  MRI brain/head without contrast (1/27/21): mild generalized cortical atrophy slightly pronounced in the dorsal lateral prefrontal cortex with some scattered right greater than left subcortical microvascular changes consistent with hypertensive strokes. microvascular changes seem to be largely stable compared to 2018 however atrophy pattern seems to be slightly pronounced.        Assessment:        The patient's clinical presentation is behavior/psychiatric predominant major cognitive impairment (mild dementia) sufficient to impair activities of daily living (CDR-SOB: 4.5 , [place]if[place]-Sky iADL: 8/8 - Mild Dementia).     The patient's clinical presentation meets the criteria for Dementia (Wendy, et al. 2011 Alzheimer's & Dementia).     Concern regarding an intraindividual change in cognition diagnosed through a combination of history and objective cognitive assessment  Impairment in two or more cognitive domains  Interference with independence in functional abilities.  Represents a decline from previous levels of functioning.  Not explained by delirium or major psychiatric disorder           The patient's clinical syndrome is unclear and has features of multiple syndromes. 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 Neurology 2005). 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  The pathology  underlying Ms. Garcia's cognitive impairment is unclear. Possibilities include a mixed Lewy body disease/alpha-synucleinopathy and and dorsal prefrontal ADRP. Frontotemporal Dementia with Parkinsonism-17/Three-Repeat Tauopathy remains a possibility however the lack of parkinsonism on examination. 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 As such this is speculation at this time. Known Genetics: Negative for [employer] testing of 33 genes selected ALS2 ANG ANXA11 DAMARIS CHCHD10 CHMP2B DCTN1 ERBB4 FUS GRN HEXA YTBOAD3P0 ITM2B KIF5A MAPT OPTN PFN1 PRNP PSEN1 PSEN2 SETX SNCA SOD1 SORL1 SPG11 SQSTM1 TARDBP TBK1 TFG TREM2 UBQLN2 VAPB VCP. The observations made above, were discussed with the patient and their supporting historian(s) (daughter). We have discussed the additional diagnostic(s) and/or managenent below.     Care Management Plan:    #Optimize Neurocognitive Impairment and Quality  We have discussed the MIND Diet and other lifestyle behavior that may help maintain brain health.  We have provided written/digital reading material  Recommend continuing donepezil 10 mg daily  #Optimize Behavioral Management and Quality.  No indication for memantine at this time  #Optimize Sleep Hygiene and Quality  We discussed and recommended additional diagnostic/management of sleep disorder to optimize brain health and longevity.  Continue Belsomra 20 mg at night.  Continue gabapentin 100 mg HS - unable to tolerate doses above 600 mg  #Optimize Cerebrovascular Health.  The patient has a documented history of hyperlipidemia and/or hypercholesteremia with long-term complications such as cerebrovascular disease, peripheral  vascular disease, and/or aortic atherosclerosis. Collectively these risk factors may contribute to cerebral atherosclerosis, and cerebral hypoperfusion compounded neurocognitive disorder. We discussed maximizing cerebrovascular-related medical therapy, including but not limited to cholesterol medications and antiplatelet agents. We have discussed the value of aggressively controlling vascular risk factors like hypertension, hyperlipidemia, and Diabetes SBP<130, LDL<100, and A1C<7.0. We discussed the need to optimize lifestyle choices, including a heart-healthy diet (e.g., Mediterranean or DASH), increased cardiovascular exercise (goal 150 minutes of moderate-intensity per week), and staying cognitively and socially active.  Continue Crestor 20 mg  #Optimize Movement Disorder and Quality.  Continue acetylcysteine 1800 mg daily  Continue Lamictal 50 mg BID  We have referred to Neurology-specific physical therapy/occupational therapy for massage  #Optimize Pain Disorder and Quality.  May consider the addition of Dopa-Mucuna 1 tablet 800mg in AM for muscle tension  Start scheduled tylenol 500mg TID for pain  May start ibuprophen up to 600mg BID PRN for pain  May start gabapentin 100mg PRN for pain  #Coordination of Care Management Planning.  We have recommended additional care management through social work support.  We [facility]  #Behavioral/Environmental Strategies  We recommend engaging in activities that stimulate cognitively and socially while avoiding excessive stimulation and fatigue in overwhelmingly complex situations.  We recommend integrating routine and schedule into your daily life. https://www.alzheimersproject.org/news/the-importance-of-routine-and-familiarity-to-persons-with-dementia/  #Health Maintenance/Lifestyle Advice  We have discussed the value in aggressively controlling vascular risk factors like hypertension, hyperlipidemia,  and Diabetes SBP<130, LDL<100, A1C<7.0.  We discussed the need to optimize lifestyle choices including a heart-healthy diet (e.g., Mediterranean or DASH), increased cardiovascular exercise (goal 150 minutes of moderate-intensity per week), and stay cognitively and socially active.  #Support  We all need support sometimes. Get easy access to local resources, community programs, and services. https://www.communityresourcefinder.org/  Learn more about Cognitive Impairment in [place]: https://www.alz.org/professionals/public-health/state-overview/[place]  Learn more about your local community's dementia and neurocognitive impairment resources in [place]: https://www.alz.org/[place]/helping_you  Learn more about your local support for dementia: https://www.alz.org/[place]/helping_you/support  Learn more about housing support for people living with dementia in [place]: https://www.Glofox.Conkwest/memory-care-in-[place]/  Learn more about financial support for people living with dementia in [place]: https://www.iHookup Social.Conkwest/financial-assistance/  Learn more about caregiver support with the Family Caregiver Elmo: http://www.caregiver.org  #Safety  Individuals living with Alzheimer's disease and other dementias are at increased risk for injury or harm in certain areas of the home. As the disease progresses, they may become unaware of the dangers that exist. Consider taking the following precautions to create a safe environment that can prevent dangerous situations from occurring and help maximize independence for as long as possible. https://www.alz.org/help-support/resources/home_safety_checklist  [place] has no laws against driving with dementia specifically but obviously has laws about medical conditions which impact a person's ability to drive safely. If you believe your loved one's driving capacity has diminished, please reach out to either your primary care physician or our office to  "discuss driving restrictions or revocation of their license. To learn more: https://www.dementiacarecentral.com/caregiverinfo/driving-problems/  The Alzheimer's Association administers the nationwide "Safe Return" program with identification bracelets, necklaces, or clothing tags and 24-hour assistance. More information is available online at https://www.alz.org/help-support/caregiving/safety/medicalert-with-24-7-wandering-support  #Follow up:  Follow-up as needed.    Thank you for allowing us to participate in the care of your patient. Please do not hesitate to contact us with any questions or concerns.     It was a pleasure seeing The patient and we look forward to seeing them at their follow-up visit.     This note is dictated on M*Modal Fluency Direct word recognition program. There are word recognition mistakes that are occasionally missed on review.         Scheduled Follow-up :  Future Appointments   Date Time Provider Department Center   4/17/2023  3:15 PM [facility]   5/8/2023  1:15 PM Faustino Garrison MD [facility]   9/14/2023  1:00 PM Shonna Byrne NP [facility]       After Visit Medication List :     Medication List            Accurate as of March 28, 2023  4:50 PM. If you have any questions, ask your nurse or doctor.                CONTINUE taking these medications      acetylcysteine (bulk) Powd  TAKE 2 TABLETS (1200MG) DAILY FOR 3 WEEKS, THEN TAKE 4 TABLETS DAILY     ALPRAZolam 0.5 MG tablet  Commonly known as: XANAX  Take 1 pill 30 minutes prior to procedure and may repeat if necessary.     BELSOMRA 20 mg Tab  Generic drug: suvorexant  Take 1 tablet by mouth every evening.     donepeziL 10 MG tablet  Commonly known as: ARICEPT  TAKE 1 TABLET BY MOUTH EVERY DAY IN THE EVENING     gabapentin 100 MG capsule  Commonly known as: NEURONTIN  Take 1 capsule (100 mg total) by mouth every evening.     GAS-X MAXIMUM " STRENGTH ORAL     lamoTRIgine 25 MG tablet  Commonly known as: LAMICTAL  TAKE 2 TABLETS BY MOUTH TWICE A DAY     midodrine 5 MG Tab  Commonly known as: PROAMATINE  TAKE 2 TABLETS (10 MG TOTAL) BY MOUTH 3 (THREE) TIMES DAILY WITH MEALS.     rosuvastatin 20 MG tablet  Commonly known as: CRESTOR  Take 1 tablet (20 mg total) by mouth once daily.     UNABLE TO FIND     UNABLE TO FIND     UNABLE TO FIND     UNABLE TO FIND     UNABLE TO FIND              Signing Physician:  Phill Villanueva MD    Billing:        -----------------------------------------------------------------------------    I performed this consultation using real-time Telehealth tools, including a live video connection between my location and the patient's location (their home [facility]). Prior to initiating the consultation, I obtained informed verbal consent to perform this consultation using Telehealth tools and answered all the questions about the Telehealth interaction. The participants understand that only a limited neurological exam and limited neuropsychological testing can be performed using Telehealth tools.    I spent a total of 70 minutes (time-in: 16:30 PM; time-out: 17:40 PM) on 2023-03-28, virtually face-to-face with the patient and caregiver(s), >50% of that time was spent counseling regarding the symptoms, treatment plan, risks, therapeutic options, lifestyle modifications, and/or safety issues for the diagnoses above.    10/14 Review of Systems completed and is negative except as stated above in HPI (Systems reviewed: Const, Eyes, ENT, Resp, CV, GI, , MSK, Skin, Neuro)    I independently reviewed radiological imaging, specimen, and tracing for a total of 5 minutes on 2023-03-28. This is directly related to the face-to-face encounter. Independent review of radiological imaging, specimen, and tracing was noted to be within normal limits except as stated above in HPI    I reviewed the summation of records from outside  physicians for a total of 10 minutes on 2023-03-28. Reviewed and summation of records from an outside physician was performed as summarized above in HPI    I performed a neurobehavioral status examination that included a clinical assessment of thinking, reasoning, and judgment. Please see above HPI and ROS for full details. This exam was performed on 2023-03-28 and included 14 minutes spent on direct face-to-face clinical observation and interview with the patient and 20 minutes spent interpreting test results and preparing the report. The total time of 34 minutes spent on the neurobehavioral status examination is not included in the time spent on evaluation and management coding.    Total Billing time spent on encounter/documentation for this patient's evaluation and management, not including the neurobehavioral status examination: 71 minutes.

## 2023-04-13 RX ORDER — GABAPENTIN 100 MG/1
100 CAPSULE ORAL NIGHTLY
Qty: 30 CAPSULE | Refills: 3 | Status: SHIPPED | OUTPATIENT
Start: 2023-04-13 | End: 2023-07-28

## 2023-05-01 RX ORDER — DONEPEZIL HYDROCHLORIDE 10 MG/1
10 TABLET, FILM COATED ORAL EVERY MORNING
Qty: 90 TABLET | Refills: 3 | Status: SHIPPED | OUTPATIENT
Start: 2023-05-01 | End: 2024-04-30

## 2023-07-27 RX ORDER — SUVOREXANT 20 MG/1
1 TABLET, FILM COATED ORAL NIGHTLY
Qty: 90 TABLET | Refills: 3 | Status: SHIPPED | OUTPATIENT
Start: 2023-07-27 | End: 2024-02-08

## 2023-07-28 RX ORDER — GABAPENTIN 100 MG/1
CAPSULE ORAL
Qty: 30 CAPSULE | Refills: 3 | Status: SHIPPED | OUTPATIENT
Start: 2023-07-28 | End: 2023-10-18 | Stop reason: ALTCHOICE

## 2023-08-09 DIAGNOSIS — F02.80 FRONTOTEMPORAL LOBAR DEGENERATION: ICD-10-CM

## 2023-08-09 DIAGNOSIS — G31.09 FRONTOTEMPORAL LOBAR DEGENERATION: ICD-10-CM

## 2023-08-10 RX ORDER — LAMOTRIGINE 25 MG/1
50 TABLET ORAL 2 TIMES DAILY
Qty: 360 TABLET | Refills: 3 | Status: SHIPPED | OUTPATIENT
Start: 2023-08-10 | End: 2024-08-09

## 2023-09-14 PROBLEM — E74.819 DISORDERS OF GLUCOSE TRANSPORT, UNSPECIFIED: Status: RESOLVED | Noted: 2022-02-08 | Resolved: 2023-09-14

## 2023-09-14 PROBLEM — J43.2 CENTRILOBULAR EMPHYSEMA: Status: ACTIVE | Noted: 2023-09-14

## 2023-10-18 ENCOUNTER — PATIENT MESSAGE (OUTPATIENT)
Dept: NEUROLOGY | Facility: CLINIC | Age: 78
End: 2023-10-18
Payer: MEDICARE

## 2023-10-23 ENCOUNTER — PATIENT MESSAGE (OUTPATIENT)
Dept: NEUROLOGY | Facility: CLINIC | Age: 78
End: 2023-10-23
Payer: MEDICARE

## 2023-10-23 DIAGNOSIS — I95.1 ORTHOSTATIC HYPOTENSION: ICD-10-CM

## 2023-10-25 RX ORDER — MIDODRINE HYDROCHLORIDE 5 MG/1
10 TABLET ORAL
Qty: 90 TABLET | Refills: 1 | Status: CANCELLED | OUTPATIENT
Start: 2023-10-25 | End: 2024-04-22

## 2023-10-27 RX ORDER — MIDODRINE HYDROCHLORIDE 5 MG/1
10 TABLET ORAL
Qty: 540 TABLET | Refills: 1 | Status: SHIPPED | OUTPATIENT
Start: 2023-10-27 | End: 2024-04-24

## 2024-01-15 RX ORDER — ROSUVASTATIN CALCIUM 20 MG/1
20 TABLET, COATED ORAL DAILY
Qty: 90 TABLET | Refills: 3 | Status: CANCELLED | OUTPATIENT
Start: 2024-01-15 | End: 2025-01-14

## 2024-01-17 RX ORDER — ROSUVASTATIN CALCIUM 20 MG/1
20 TABLET, COATED ORAL
Qty: 90 TABLET | Refills: 3 | Status: SHIPPED | OUTPATIENT
Start: 2024-01-17

## 2024-01-25 DIAGNOSIS — R53.83 FATIGUE, UNSPECIFIED TYPE: Primary | ICD-10-CM

## 2024-01-25 RX ORDER — MODAFINIL 200 MG/1
200 TABLET ORAL DAILY
Qty: 30 TABLET | Refills: 0 | Status: SHIPPED | OUTPATIENT
Start: 2024-01-25 | End: 2024-02-26 | Stop reason: SDUPTHER

## 2024-02-06 ENCOUNTER — PATIENT MESSAGE (OUTPATIENT)
Dept: NEUROLOGY | Facility: CLINIC | Age: 79
End: 2024-02-06
Payer: MEDICARE

## 2024-02-07 ENCOUNTER — PATIENT MESSAGE (OUTPATIENT)
Dept: RESEARCH | Facility: HOSPITAL | Age: 79
End: 2024-02-07
Payer: MEDICARE

## 2024-02-07 ENCOUNTER — TELEPHONE (OUTPATIENT)
Dept: NEUROLOGY | Facility: CLINIC | Age: 79
End: 2024-02-07
Payer: MEDICARE

## 2024-02-07 NOTE — TELEPHONE ENCOUNTER
----- Message from Cristina Ulrich sent at 2/7/2024  4:00 PM CST -----  Contact: [phone]    Rx Refill/Request     Is this a Refill or New Rx: refill  Rx Name and Strength:  suvorexant (BELSOMRA) 20 mg Tab  Preferred Pharmacy with phone number:    [id number] - [place], [place] - [address]  [address]  [place] [address]  Phone: [phone] Fax: [phone]      Communication Preference:  Additional Information patient daughter states she has been call for the refill wants to know if any other doctor can please call the medication in she is completely out of the medication

## 2024-02-07 NOTE — TELEPHONE ENCOUNTER
"----- Message from Stephanie Turpin sent at 2/7/2024 12:48 PM CST -----  Consult/Advisory:      Name Of Caller:  Juli / carla     Contact Preference:   [phone]     What is the nature of the call?: Juli Daughter  called on behalf of the pt following up on refill Rx: suvorexant (BELSOMRA) 20 mg Tab        [id number] [place] [place] [facility] [address]  [facility] [address]  Phone: [phone] Fax: [phone]          Additional Notes: Pt is completely out of the Rx           "Thank you for all that you do for our patients"          "

## 2024-02-08 RX ORDER — SUVOREXANT 20 MG/1
1 TABLET, FILM COATED ORAL NIGHTLY
Qty: 90 TABLET | Refills: 1 | Status: SHIPPED | OUTPATIENT
Start: 2024-02-08

## 2024-02-09 NOTE — TELEPHONE ENCOUNTER
Last ordered: 2/08/24    Last seen: 3/28/23 - Kay  Upcoming appt: n/a    MD already sent rx to [facility].    Attempted to call patient, LVM to return the call to discuss that rx was sent to pharmacy

## 2024-02-26 DIAGNOSIS — R53.83 FATIGUE, UNSPECIFIED TYPE: ICD-10-CM

## 2024-02-27 RX ORDER — MODAFINIL 200 MG/1
200 TABLET ORAL DAILY
Qty: 30 TABLET | Refills: 0 | Status: SHIPPED | OUTPATIENT
Start: 2024-02-27 | End: 2024-03-24 | Stop reason: SDUPTHER

## 2024-03-24 DIAGNOSIS — R53.83 FATIGUE, UNSPECIFIED TYPE: ICD-10-CM

## 2024-03-25 RX ORDER — MODAFINIL 200 MG/1
200 TABLET ORAL DAILY
Qty: 30 TABLET | Refills: 0 | Status: SHIPPED | OUTPATIENT
Start: 2024-03-25 | End: 2024-04-29 | Stop reason: SDUPTHER

## 2024-04-02 NOTE — PROGRESS NOTES
"[facility]     PATIENT: Christine Garcia  VISIT DATE: 2024  MRN: [id number]  PRIMARY PROVIDER: BRENDEN Bailey Jr., MD  : 1945       Chief complaint: Discuss medications      History of present illness:   The 78-year-old right-handed female patient, accompanied by her daughter, came in for a follow-up.     Since her last appointment on 3/23 and being under palliative care since , she experiences disrupted sleep due to vivid dreams and urinary incontinence, necessitating frequent changes of disposable undergarments. Initially prescribed Mybetriq, it was stopped after a week because of syncopal episodes and increased falls; oxybutynin was then started by palliative care. She also experiences unprovoked crying episodes. This visit revealed jerking movements in her left limbs. An incident in , where she "fell backward with her eyes rolling back," observed by her , was not medically evaluated. Additionally, she is non-compliant with her CPAP machine and resides with her , receiving day-long caregiver support.    Neurocognitive Disorder Features  Onset/Duration:  Sep 2014 (~8-year)  First Symptom:  Memory impairment  Progression:  Gradually Progressive  Known Relevant Family history:  Father lived into his early 90s without any significant medical problems  Mother lived into her late 80s with some mild dementia towards the last 2-3 years of her life however the but otherwise non bothersome.  All 3 children have ADHD like symptoms  Grandaunt possibly suggestive for bipolar disorder  Brother has childhood epilepsy  Neurocognitive Disorder:  Mother lived into her late 80s with some mild dementia towards the last 2-3 years of her life however the but otherwise non bothersome.  Movement Disorder:  The family denies a history of movement disorder (PD, PDD, tremor, etc).  Motorneuron Disorder:  The family denies a history of motor neuron disease " (ALS).  Developmental Disorder:  All 3 children have ADHD like symptoms  Psychiatric Disorder:  Grandaunt possibly suggestive for bipolar disorder  Known Relevant Genetics:  Negative for [employer] testing of 33 genes selected ALS2 ANG ANXA11 DAMARIS CHCHD10 CHMP2B DCTN1 ERBB4 FUS GRN HEXA MGAFIU1P8 ITM2B KIF5A MAPT OPTN PFN1 PRNP PSEN1 PSEN2 SETX SNCA SOD1 SORL1 SPG11 SQSTM1 TARDBP TBK1 TFG TREM2 UBQLN2 VAPB VCP  Developmental Milestones:  The patient/family report no known birth complications or early life problems. The patient met all developmental milestones.  Education/Learning Capacity:  The patient/family report no signs or symptoms suggestive of developmental learning disorder.  HS.  Estimated Educational Experience: 12 years of formal education.  Social History:  Lives with  and daughter. Patient has regular caretakers/sitters.  Career/Skill [place]:  Patient was an [employer] grade after [employer]. 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 She continues to [employer].  Retired/Quit: 0  Relevant Medical History:  Hypertension  Major depressive disorder Since 2017 - previous medications include Prozac, citalopram  Hyperlipidemia  Migraine headaches  Patent foramen ovale  Obesity  obstructive sleep apnea on CPAP         Review of cognitive, visuospatial, motor, sensory, and behavioral systems:     Memory:   The patient's memory has worsened in the past few years.  She does repeat statements or asks the same question repeatedly.  She does have difficulty remembering recent important conversations.  She does have difficulty remembering recent events.  She does forget information within minutes.  Her recent retrograde memory is impaired.  Her remote memory is intact.  Attention:   The patient's attention and concentration are impaired.  She does  have  attentional fluctuations.  She does have difficulty with selective attention.  She does become easily distracted.  She does have difficulty with divided attention.  Executive:   The patient's cognitive processing speed is slower.  She does have difficulty with working memory.  She does misplace personal items (e.g., keys, cell phone, wallet) more frequently.  She does have difficulty keeping track of her medications.  She does have difficulty with planning/organizing/completing multistep tasks.  She does have difficulty with executive attention.  She does not have difficulty with flexible thinking.  She does not difficulty with self control.  She is exhibiting new symptoms that suggest they have become more impulsive, rash and/or careless.  Her judgment is impaired.  Language:   The patient's speech is  affected.  She does forget people's names more frequently.  She does  have word-finding difficulties.  Her speech is non-fluent and effortful.  Her speech is not grammatically intact.  She does make word substitutions.  She does  have difficulty reading.  She does appear to have impaired comprehension.  Visuospatial:   The patient does appear to have  visuospatial difficulty.  She has become confused or disoriented in *new*, unfamiliar places.  She does have trouble navigating.  She does not get lost in familiar places.  She does have visuospatial disorientation.  She does not have difficulty recognizing objects or faces.  Motor/Coordination:   The patient does  have difficulty with walking.  She does not feel imbalanced.  She has had recent and repeated falls.   She does not appear to have new muscle weakness.  She does have difficulty buttoning shirts, operating zippers, or manipulating tools/utensils.  Her handwriting has not become micrographic.  She does not have a resting tremor.  She exhibits  involuntary movements and/or muscle jerking.  She does not have swallowing difficulty.  She denies new muscle cramps  "and twitching.  Sensory:   The patient denies new numbness, tingling, paresthesias, or pain.  The patient denies a loss of vision, blurry vision, or double vision.  The patient denies new loss of hearing or worsening tinnitus.  The patient does have anosmia.  Sleep:   The patient reports difficulty sleeping. Comment: non-compliant with CPAP.   The patient does have difficulty going to sleep.-   The patient reports difficulty staying asleep and/or frequently awakening at night.  The patient does snore and/or have witnessed apneas while sleeping.   When she wakes up in the morning and is not l well-rested.  She has been reported to have dream-enactment behavior.   She denies symptoms suggestive of restless leg syndrome.  Behavior:   The patient's personality has changed.  She does have symptoms of disinhibition and social inappropriateness.  She is exhibiting symptoms to suggest a loss of manners and/or decorum.  She does not have apathy and/or decreased motivation. Comment:  She mostly sits in her  walk closet and sit and " rock" in a chair with her head down.   She does appear to have had a change in behavioral/emotional inertia.  The patient's emotional expression has changed.  She does have emotional blunting or lability.  She does have symptoms of irritability and mood lability. Comment: is crying more now.   She has been reported to have new symptoms of agitation, aggression, or violent outbursts. Comment: will become more irritable especially if she doesn't want to bathe but is not exhibiting physical aggression.    Her insight into his disease and situation is impaired.  Her personal hygiene is intact.  She is exhibiting a diminished response to other people's needs and feelings.  .  She is exhibiting diminished social interest, interrelatedness, and/or personal warmth.  She does appear restless.  She does exhibit and/or worsening simple repetitive behaviors- she has a hx of skin picking but not as frequent as " "before  Ser speech exhibits simplified or become repetitive/stereotyped.  She reports symptoms that are suggestive of new/worsening complex repetitive/ritualistic compulsions and behaviors.  She does not have symptoms of hyper-religiosity or dogmatism.  Her interests/pleasures have not become restrictive, simplified, interrupting, or repetitive.  She is exhibiting symptoms to suggest an increase in self-stimulating behavior.   She has had changes in eating behavior.   She has been exhibited symptoms to suggest increased consumption of food and/or alcohol/cigarettes.  Daughter reports oral exploration or consumption of inedible objects- tried eating a "bath bomb"  Psychiatric:   She does feel depressed.  tearful at times without a clear cause.   She is exhibiting symptoms of social withdrawal/indifference.  She does have anxiety.   She does exhibit cycling behavior.  She does exhibit hyperactive behavior.  She is not exhibited symptoms of paranoia.  She does have delusions.  She does have hallucinations. - She is mostly non-verbal and unable to verbalize if she is having hallucinations.    She does have a history of sensitivity to neuroleptic/psychotropic medications.  Medical Review of Systems:   The patient does not have constipation.  The patient does have urinary incontinence. Comment: urinary has worsened since last visit 2023 and is totally incontinent.   The patient reports symptoms that are suggestive of orthostatic lightheadedness.  The patient's weight is unstable. Comment: weight is up 2 lbs since 11/23.  Functional status:  Difficulty performing the following Instrumental ADLs:  Household chores: Yes  Food Preparation: Yes  Shopping: Yes  Ability to Handle Finances: Yes  Transportation/Driving: Yes  Household Appliances/Stove: Yes  Laundry: Yes  Difficulty performing the following Basic ADLs:  Dressing: No  Bathing: yes  Toileting: yes  Personal hygiene and grooming: yes  Feeding: yes  Care " Management:  Patient/Family Safety Concerns:  Medication Adherence: Yes  Home Safety: No  Wandered: No  Firearms: No  Fall Risk: yes  Home Alone: No          Past Medical History:   Diagnosis Date    Alzheimer's disease     Anxiety     Depression     Dyslipidemia 10/02/2015    Fall 2020    with fracture    Hyperlipidemia     Hypertension     minimal    Migraines     Obesity 10/02/2015    Paranoia     Patent foramen ovale 10/02/2015       Past Surgical History:   Procedure Laterality Date    COLONOSCOPY N/A 2017    Procedure: COLONOSCOPY;  Surgeon: Gerber Naylor MD;  Location: [facility] ENDO;  Service: Endoscopy;  Laterality: N/A;    HYSTERECTOMY      KNEE ARTHROPLASTY Bilateral 2020    meniscus repair    left elbow      left knee      OOPHORECTOMY      OPEN REDUCTION AND INTERNAL FIXATION (ORIF) OF INJURY OF ANKLE Right 11/3/2020    Procedure: ORIF, ANKLE;  Surgeon: Ted Rome MD;  Location: [facility] OR;  Service: Orthopedics;  Laterality: Right;       Family History   Problem Relation Age of Onset    Diabetes Father     Heart disease Father     COPD Father     Lung cancer Brother     Cancer Sister        Social History     Socioeconomic History    Marital status:      Spouse name: josé    Number of children: 3   Tobacco Use    Smoking status: Former     Current packs/day: 0.00     Average packs/day: 1 pack/day for 20.0 years (20.0 ttl pk-yrs)     Types: Cigarettes     Start date:      Quit date:      Years since quittin.2    Smokeless tobacco: Never    Tobacco comments:     1-2ppd   Substance and Sexual Activity    Alcohol use: Yes     Alcohol/week: 1.0 standard drink of alcohol     Types: 1 Shots of liquor per week     Comment: occ    Drug use: Never    Sexual activity: Yes     Partners: Male     Birth control/protection: Post-menopausal, None     Social Determinants of Health     Financial Resource Strain: Low Risk  (2023)    Overall Financial Resource Strain  (CARDIA)     Difficulty of Paying Living Expenses: Not hard at all   Food Insecurity: No Food Insecurity (7/20/2023)    Hunger Vital Sign     Worried About Running Out of Food in the Last Year: Never true     Ran Out of Food in the Last Year: Never true   Transportation Needs: No Transportation Needs (7/20/2023)    PRAPARE - Transportation     Lack of Transportation (Medical): No     Lack of Transportation (Non-Medical): No   Physical Activity: Inactive (7/20/2023)    Exercise Vital Sign     Days of Exercise per Week: 0 days     Minutes of Exercise per Session: 0 min   Stress: Stress Concern Present (7/20/2023)    [employer] [place] [employer] - Occupational Stress Questionnaire     Feeling of Stress : Very much   Social Connections: Moderately Integrated (7/20/2023)    Social Connection and Isolation Panel [NHANES]     Frequency of Communication with Friends and Family: Never     Frequency of Social Gatherings with Friends and Family: Once a week     Attends Nondenominational Services: More than 4 times per year     Active Member of Clubs or Organizations: No     Attends Club or Organization Meetings: More than 4 times per year     Marital Status:    Housing Stability: Low Risk  (7/20/2023)    Housing Stability Vital Sign     Unable to Pay for Housing in the Last Year: No     Number of Places Lived in the Last Year: 1     Unstable Housing in the Last Year: No       Medication:     Current Outpatient Medications on File Prior to Visit   Medication Sig Dispense Refill    acetylcysteine, bulk, Powd TAKE 2 TABLETS (1200MG) DAILY FOR 3 WEEKS, THEN TAKE 4 TABLETS DAILY (Patient taking differently: Patient taked 2000 mg in am and 2000 mg in pm) 120 g 1    aspirin (ECOTRIN) 81 MG EC tablet Take 81 mg by mouth once daily.      CMPD hydrocortisone 2%- LIDOcaine 3% suppository (RECTAL ROCKET) Place 1 suppository rectally every evening. Insert 1 suppository 3/4 of the way onto rectum. Wet for easier application.  Repeat for 3 nights. 6 suppository 1    donepeziL (ARICEPT) 10 MG tablet Take 1 tablet (10 mg total) by mouth every morning. 90 tablet 3    lamoTRIgine (LAMICTAL) 25 MG tablet Take 2 tablets (50 mg total) by mouth 2 (two) times daily. 360 tablet 3    linaCLOtide (LINZESS) 72 mcg Cap capsule Take 1 capsule (72 mcg total) by mouth before breakfast. 30 capsule 0    LINZESS 72 mcg Cap capsule TAKE 1 CAPSULE (72 MCG TOTAL) BY MOUTH BEFORE BREAKFAST 30 capsule 0    midodrine (PROAMATINE) 5 MG Tab Take 2 tablets (10 mg total) by mouth 3 (three) times daily with meals. 540 tablet 1    midodrine (PROAMATINE) 5 MG Tab Take 2 tablets by mouth 3 times a day with meals 180 tablet 0    modafiniL (PROVIGIL) 200 MG Tab Take 1 tablet (200 mg total) by mouth once daily. 30 tablet 0    oxybutynin (DITROPAN-XL) 10 MG 24 hr tablet Take 1 tablet (10 mg total) by mouth once daily. 90 tablet 1    rosuvastatin (CRESTOR) 20 MG tablet TAKE 1 TABLET BY MOUTH EVERY DAY 90 tablet 3    senna (SENOKOT) 8.6 mg tablet Take 1 tablet by mouth once daily. 4 tablets daily      suvorexant (BELSOMRA) 20 mg Tab TAKE 1 TABLET BY MOUTH EVERY DAY IN THE EVENING 90 tablet 1    UNABLE TO FIND       UNABLE TO FIND       UNABLE TO FIND       UNABLE TO FIND       UNABLE TO FIND        Current Facility-Administered Medications on File Prior to Visit   Medication Dose Route Frequency Provider Last Rate Last Admin    nitroGLYCERIN SL tablet 0.4 mg  0.4 mg Sublingual Once Faustino Davidson MD            Review of patient's allergies indicates:   Allergen Reactions    Sucrose Other (See Comments)     GI issues -    Myrbetriq [mirabegron] Other (See Comments)     Moderate to severe dizziness        Medications Reconciliation:   I have reconciled the patient's home medications and discharge medications with the patient/family. I have updated all changes.  Refer to After-Visit Medication List.    Objective:  Vital Signs:  There were no vitals filed for this  visit.  Wt Readings from Last 3 Encounters:   11/13/23 1035 70.9 kg (156 lb 4.8 oz)   10/18/23 1516 72 kg (158 lb 11.2 oz)   09/14/23 1315 73.1 kg (161 lb 1.6 oz)     There is no height or weight on file to calculate BMI.           Neurological examination:  Mental Status:   Her appearance was abnormal.   Comment: looks older than stated age;  Throughout the interview, she behaved abnormally and was cooperative. ;  Her behavior was inappropriate to the clinical context and situation.   Comment: child-like, tearful.   The patient's energy level is abnormal.   Comment: Fluctuating;  Her orientation is not entirely accurate.  Her attention/concentration is impaired.  She is unable to complete three-step commands without making errors.  Her fund of knowledge was less than what would be expected given age, culture and level of education.  Her thought process is not logical or goal-oriented. ;  She demonstrated impaired insight based on actions, awareness of her illness, plans for the future.  She demonstrated impaired judgment based on actions and plans for the future.  She did not exhibit evidence of hallucinations.  She did not exhibit evidence of delusions.  Cranial Nerves:   Her pupils were normal.  Her blink rate was abnormal.   Comment: decreased;  Her facial strength was normal.  Her facial expression was symmetric and appropriate to the context.  She does not require the use of hearing aids.  Her tongue showed no evidence of scalloping.  She tongue movement with normal.  She had no significant evidence of anterocollis or retrocollis.  Speech/Language:   The patient's speech was not completely non-fluent and non-effortful.  Her speech timbre is abnormal.   Comment: quiet;  Her speech rate is abnormal.  Her respirations are within normal range and appropriate to context.  Her speech timbre is normal.  She has articulation (segmental features) errors.  Her tone is emotionally limited, and her tempo was arrhythmic.    Comment: monotone;  The patient's speech is  dysarthric.  The patient's speech exhibited evidence of anomia.  SThere is  myoclonus observed in the left upper and lower extremities.   There are no fasciculations observed in The patient's bilateral upper and lower extremities.  Coordination:   She has evidence of akathisia.   Higher Cortical Function:   The patient showed evidence of apraxia.  She showed dysexecutive behavior.  She has evidence of perseverative or stereotyped behavior.  She has no stimulus-bound behavior.    Laboratories:     Lab Date Value [Reference]   Autoimmune/Paraneoplastic Screening           GIANNI Screen 2021, Sep-20  2021, Sep-20  2021, Sep-20    None Detected [None Detected]  None Detected [None Detected]  None Detected [None Detected]      Sed Rate 2021, Sep-20  2021, Sep-20  2021, Sep-20    7 [0 - 29 mm/Hr]  7 [0 - 29 mm/Hr]  7 [0 - 29 mm/Hr]      Metabolic Screening   Hemoglobin A1C External 09/20/2021  5.2 [4.0 - 5.6 %]  5.2 [4.0 - 5.6 %]  5.2 [4.0 - 5.6 %]      Homocysteine 06/13/2020  5.0 [4.0 - 15.5 umol/L]  5.0 [4.0 - 15.5 umol/L]  5.0 [4.0 - 15.5 umol/L]      TSH 04/18/20202020, Apr-18    1.500 [0.40 - 4.00 uIU/mL]  1.760 [0.40 - 4.00 uIU/mL]      Glucose 2021, Apr-28    105 [70 - 110 mg/dL]      Albumin 2021, Apr-28 2021, Apr-28 2021, Jan-27    4.2 [3.5 - 5.2 g/dL]  4.2 [3.5 - 5.2 g/dL]  4.6 [3.5 - 5.2 g/dL]      Alkaline Phosphatase 2021, Apr-28 2021, Apr-28 2021, Jan-27    108 [38 - 145 U/L]  108 [38 - 145 U/L]  106 [38 - 145 U/L]      ALT 2021, Apr-28 2021, Apr-28 2021, Jan-27    25 [0 - 35 U/L]  25 [0 - 35 U/L]  26 [0 - 35 U/L]      AST 2021, Apr-28 2021, Apr-28 2021, Jan-27    32 [14 - 36 U/L]  32 [14 - 36 U/L]  37 (H) [14 - 36 U/L]      BILIRUBIN TOTAL 2021, Apr-28 2021, Apr-28 2021, Jan-27    0.9 [0.2 - 1.3 mg/dL]  0.9 [0.2 - 1.3 mg/dL]  0.9 [0.2 - 1.3 mg/dL]      PROTEIN TOTAL 2021, Apr-28 2021, Apr-28 2021, Jan-27    7.0 [6.0 - 8.4 g/dL]  7.0 [6.0 - 8.4  g/dL]  7.7 [6.0 - 8.4 g/dL]      Cholesterol 2021, Oct-28    177 [120 - 199 mg/dL]      HDL 2021, Oct-28    51 [40 - 75 mg/dL]      Non-HDL Cholesterol 2021, Oct-28    126 [mg/dL]      Triglycerides 04/18/2020  281 (H) [30 - 150 mg/dL]      Folate 04/18/2020  >20.0 [4.0 - 24.0 ng/mL]      Thiamine 2020, Apr-18    142 [70 - 180 nmol/L]      Vit D, 25-Hydroxy 04/18/2020  84 [30 - 96 ng/mL]  84 [30 - 96 ng/mL]  84 [30 - 96 ng/mL]      Vitamin B-12 2020, Apr-18    798 [210 - 950 pg/mL]      Vitamin B2 2020, Apr-18    15 [5 - 50 nmol/L]      Vitamin B6 2020, Jun-13 2020, Apr-18    397.3 (H) [20.0 - 125.0 nmol/L]  300.6 (H) [20.0 - 125.0 nmol/L]      Infectious Disease/Immunocompromised Screening   SARS-CoV-2 RNA, Amplification, Qual 2020, Nov-02    Negative      Standard Hematology Screen   Hematocrit 2021, Jan-27    44.4 [37.0 - 48.5 %]      Hemoglobin 2021, Jan-27    14.9 [12.0 - 16.0 g/dL]      MCV 2021, Jan-27    88 [82 - 98 fL]      Platelets 2021, Jan-27    261 [150 - 350 K/uL]      Neuroendocrine/Electrolyte Screening   BUN 2021, Apr-28    20 (H) [7 - 18 mg/dL]      Chloride 2021, Apr-28    106 [95 - 110 mmol/L]      Creatinine 2021, Apr-28    0.84 [0.5 - 1.4 mg/dL]      Potassium 2021, Apr-28    4.6 [3.5 - 5.1 mmol/L]      Sodium 2021, Apr-28    142 [136 - 145 mmol/L]      Delirium Screening   Bacteria, UA 2021, Dec-10    Few (A) [Negative /hpf]      Glucose, UA 2021, Dec-10    Negative      Ketones, UA 2021, Dec-10    Negative      Leukocytes, UA 2021, Dec-10    Negative      NITRITE UA 2021, Dec-10    Negative      Protein, UA 2021, Dec-10    Negative      RBC, UA 2021, Dec-10    0 [0 - 4 /hpf]      WBC, UA 2021, Dec-10    1 [0 - 5 /hpf]              Assessment:     The patient, a 78-year-old right-handed female, presents with a notable medical history that includes Dementia with Lewy Bodies (DLB), dysautonomia, hypertension, hyperlipidemia, migraine headaches, Major Depressive Disorder (MDD), Obstructive  Sleep Apnea (ERENDIRA), and non-compliance with CPAP therapy. Her clinical assessments indicate moderate dementia, with a Clinical Dementia Rating-Sum of Boxes (CDR-SOB) score of 4.5 and full dependency in instrumental activities of daily living as shown by a Cesar-Sky Instrumental Activities of Daily Living (iADL) score of 8/8. Over an 8-year period, she has experienced a gradual progression of executive-predominant, multi-domain major cognitive impairment, significantly impacting her daily living activities and escalating to a moderate-to-severe level of dementia.  Diagnosis remains frontotemporal degeneration with parkinsonism. Recent developments in her condition include pseudobulbar affect, worsening myoclonus, and dyskinesias. In light of these changes, we are emphasizing quality of life measures and are in the process of coordinating palliative care services. For symptomatic management of the dyskinesias and myoclonus, clonazepam has been recommended, with the family expressing their agreement with this approach.     Care Management Plan:    #Optimize Neurocognitive Impairment and Quality  continue the MIND Diet and other lifestyle behavior that may help maintain brain health.  Continue donepezil 10 mg daily  Will begin clonazepam 0,5 mg QHS x 1 week and then increase to 0.5 mg bid.   Continue belsomra at bedtime- daughter is aware I will discuss plan of care with Dr. Villanueva and be in touch with her if he advises further changes.   #Optimize Behavioral Management and Quality.  Continue Lamictal po bid.   #Optimize Sleep Hygiene and Quality.  Continue Belsomra 20 mg at night.  gabapentin 100 was discontinued for unknown reason   Encouraged use of CPAP .   #Optimize Cerebrovascular Health.  The patient has a documented history of hyperlipidemia and/or hypercholesteremia with long-term complications such as cerebrovascular disease, peripheral vascular disease, and/or aortic atherosclerosis. Collectively these risk  factors may contribute to cerebral atherosclerosis, and cerebral hypoperfusion compounded neurocognitive disorder. We discussed maximizing cerebrovascular-related medical therapy, including but not limited to cholesterol medications and antiplatelet agents. We have discussed the value of aggressively controlling vascular risk factors like hypertension, hyperlipidemia, and Diabetes SBP<130, LDL<100, and A1C<7.0. We discussed the need to optimize lifestyle choices, including a heart-healthy diet (e.g., Mediterranean or DASH), increased cardiovascular exercise (goal 150 minutes of moderate-intensity per week), and staying cognitively and socially active.  Continue Crestor 20 mg  #Optimize Movement Disorder and Quality.  Continue acetylcysteine 1800 mg daily  Continue Lamictal 50 mg BID  #Optimize Pain Disorder and Quality.  continue tylenol 500mg TID as needed for pain.   #Coordination of Care Management Planning.  MERI GROSSMAN visited today for  management through social work support.  Continue with palliative care as scheduled.   #Behavioral/Environmental Strategies  We recommend engaging in activities that stimulate cognitively and socially while avoiding excessive stimulation and fatigue in overwhelmingly complex situations.  We recommend integrating routine and schedule into your daily life. https://www.alzheimersproject.org/news/the-importance-of-routine-and-familiarity-to-persons-with-dementia/  #Health Maintenance/Lifestyle Advice  We have discussed the value in aggressively controlling vascular risk factors like hypertension, hyperlipidemia, and Diabetes SBP<130, LDL<100, A1C<7.0.  We discussed the need to optimize lifestyle choices including a heart-healthy diet (e.g., Mediterranean or DASH), increased cardiovascular exercise (goal 150 minutes of moderate-intensity per week), and stay cognitively and socially active.  #Support  We all need support sometimes. Get easy access to local resources, community  "programs, and services. https://www.communityresourcefinder.org/  Learn more about Cognitive Impairment in [place]: https://www.alz.org/professionals/public-health/state-overview[place]  Learn more about your local community's dementia and neurocognitive impairment resources in [place]: https://www.alz.org/[place]/helping_you  Learn more about your local support for dementia: https://www.alz.org/[place]/helping_you/support  Learn more about housing support for people living with dementia in [place]: https://www.Handpressions.GIS Cloud/memory-care-in-[place]/  Learn more about financial support for people living with dementia in [place]: https://www.BuldumBuldum.com.GIS Cloud/financial-assistance/  Learn more about caregiver support with the Family Caregiver [place]: http://www.caregiver.org  #Safety  Individuals living with Alzheimer's disease and other dementias are at increased risk for injury or harm in certain areas of the home. As the disease progresses, they may become unaware of the dangers that exist. Consider taking the following precautions to create a safe environment that can prevent dangerous situations from occurring and help maximize independence for as long as possible. https://www.alz.org/help-support/resources/home_safety_checklist  [place] has no laws against driving with dementia specifically but obviously has laws about medical conditions which impact a person's ability to drive safely. If you believe your loved one's driving capacity has diminished, please reach out to either your primary care physician or our office to discuss driving restrictions or revocation of their license. To learn more: https://www.dementiacareSiC Processingral.com/caregiverinfo/driving-problems/  The Alzheimer's Association administers the nationwide "Safe Return" program with identification bracelets, necklaces, or clothing tags and 24-hour assistance. More information is available online at " https://www.alz.org/help-support/caregiving/safety/medicalert-with-24-7-wandering-support  #Follow up:  Follow-up in two week for medication follow-up/titration or sooner if needed.     Thank you for allowing us to participate in the care of your patient. Please do not hesitate to contact us with any questions or concerns.     It was a pleasure seeing The patient and we look forward to seeing them at their follow-up visit.     This note is dictated on M*Modal Fluency Direct word recognition program. There are word recognition mistakes that are occasionally missed on review.         Scheduled Follow-up :  Future Appointments   Date Time Provider Department Center   4/5/2024 11:00 AM Gill Matias NP [facility] [id number] [facility]   4/11/2024  1:30 PM Shonna Byrne NP [facility]   4/16/2024  1:30 PM Lashaun Lopez DO [facility] [place]       After Visit Medication List :     Medication List            Accurate as of April 2, 2024 10:21 AM. If you have any questions, ask your nurse or doctor.                CHANGE how you take these medications      acetylcysteine (bulk) Powd  TAKE 2 TABLETS (1200MG) DAILY FOR 3 WEEKS, THEN TAKE 4 TABLETS DAILY  What changed: additional instructions            CONTINUE taking these medications      aspirin 81 MG EC tablet  Commonly known as: ECOTRIN     BELSOMRA 20 mg Tab  Generic drug: suvorexant  TAKE 1 TABLET BY MOUTH EVERY DAY IN THE EVENING     CMPD HYDROCORTISONE 2%, LIDOCAINE 3% SUPPOSITORY (RECTAL ROCKET)  Place 1 suppository rectally every evening. Insert 1 suppository 3/4 of the way onto rectum. Wet for easier application. Repeat for 3 nights.     donepeziL 10 MG tablet  Commonly known as: ARICEPT  Take 1 tablet (10 mg total) by mouth every morning.     lamoTRIgine 25 MG tablet  Commonly known as: LAMICTAL  Take 2 tablets (50 mg total) by mouth 2 (two) times daily.     * LINZESS 72 mcg Cap capsule  Generic drug: linaCLOtide  TAKE 1 CAPSULE (72 MCG TOTAL) BY MOUTH  BEFORE BREAKFAST     * linaCLOtide 72 mcg Cap capsule  Commonly known as: LINZESS  Take 1 capsule (72 mcg total) by mouth before breakfast.     * midodrine 5 MG Tab  Commonly known as: PROAMATINE  Take 2 tablets (10 mg total) by mouth 3 (three) times daily with meals.     * midodrine 5 MG Tab  Commonly known as: PROAMATINE  Take 2 tablets by mouth 3 times a day with meals     modafiniL 200 MG Tab  Commonly known as: PROVIGIL  Take 1 tablet (200 mg total) by mouth once daily.     oxybutynin 10 MG 24 hr tablet  Commonly known as: DITROPAN-XL  Take 1 tablet (10 mg total) by mouth once daily.     rosuvastatin 20 MG tablet  Commonly known as: CRESTOR  TAKE 1 TABLET BY MOUTH EVERY DAY     senna 8.6 mg tablet  Commonly known as: SENOKOT     UNABLE TO FIND     UNABLE TO FIND     UNABLE TO FIND     UNABLE TO FIND     UNABLE TO FIND           * This list has 4 medication(s) that are the same as other medications prescribed for you. Read the directions carefully, and ask your doctor or other care provider to review them with you.                  Signing Physician:  Gill Matias NP    Billing:        -----------------------------------------------------------------------------  I spent a total of 85 minutes (from 11:15 AM to 12:40 PM) on the day of clinical evaluation, engaging in person in a face-to-face consultation with . More than 50% of this time was devoted to counseling on symptoms, treatment plans, risks, therapeutic options, lifestyle modifications, and safety concerns related to the above diagnoses.  A Review of Systems was completed, encompassing 10 of the 14 systems. All findings were negative, with the exception of those noted in the History of Present Illness (HPI). The systems reviewed were Constitutional (Const), Eyes, Ear/Nose/Throat (ENT), Respiratory (Resp), Cardiovascular (CV), Gastrointestinal (GI), Genitourinary (), Musculoskeletal (MSK), Skin, and Neurological (Neuro).  I spent a total of 10 minutes  reviewing and summarizing records from outside physicians on the day of the clinical evaluation. This review and summary were conducted as described in the History of Present Illness (HPI).  Total Billing time spent on encounter/documentation for this patient's evaluation and management: 95 minutes.

## 2024-04-05 ENCOUNTER — PATIENT MESSAGE (OUTPATIENT)
Dept: NEUROLOGY | Facility: CLINIC | Age: 79
End: 2024-04-05

## 2024-04-05 ENCOUNTER — SOCIAL WORK (OUTPATIENT)
Dept: NEUROLOGY | Facility: CLINIC | Age: 79
End: 2024-04-05
Payer: MEDICARE

## 2024-04-05 ENCOUNTER — OFFICE VISIT (OUTPATIENT)
Dept: NEUROLOGY | Facility: CLINIC | Age: 79
End: 2024-04-05
Payer: MEDICARE

## 2024-04-05 VITALS
WEIGHT: 158.63 LBS | DIASTOLIC BLOOD PRESSURE: 80 MMHG | SYSTOLIC BLOOD PRESSURE: 124 MMHG | BODY MASS INDEX: 28.1 KG/M2 | HEART RATE: 61 BPM

## 2024-04-05 DIAGNOSIS — G90.1 DYSAUTONOMIA: ICD-10-CM

## 2024-04-05 DIAGNOSIS — G31.83 LEWY BODY DEMENTIA WITH BEHAVIORAL DISTURBANCE: ICD-10-CM

## 2024-04-05 DIAGNOSIS — G20.C PARKINSONISM, UNSPECIFIED PARKINSONISM TYPE: ICD-10-CM

## 2024-04-05 DIAGNOSIS — G31.09 OTHER MODERATE FRONTOTEMPORAL DEMENTIA WITH OTHER BEHAVIORAL DISTURBANCE: Primary | ICD-10-CM

## 2024-04-05 DIAGNOSIS — F02.B18 OTHER MODERATE FRONTOTEMPORAL DEMENTIA WITH OTHER BEHAVIORAL DISTURBANCE: Primary | ICD-10-CM

## 2024-04-05 DIAGNOSIS — F02.818 LEWY BODY DEMENTIA WITH BEHAVIORAL DISTURBANCE: ICD-10-CM

## 2024-04-05 PROCEDURE — 99999 PR PBB SHADOW E&M-EST. PATIENT-LVL III: CPT | Mod: PBBFAC,,, | Performed by: NURSE PRACTITIONER

## 2024-04-05 PROCEDURE — 99213 OFFICE O/P EST LOW 20 MIN: CPT | Mod: PBBFAC,27 | Performed by: NURSE PRACTITIONER

## 2024-04-05 PROCEDURE — 99999 PR PBB SHADOW E&M-EST. PATIENT-LVL II: CPT | Mod: PBBFAC,,,

## 2024-04-05 PROCEDURE — 99215 OFFICE O/P EST HI 40 MIN: CPT | Mod: S$PBB,,, | Performed by: NURSE PRACTITIONER

## 2024-04-05 RX ORDER — LEVETIRACETAM 500 MG/1
500 TABLET ORAL 2 TIMES DAILY
Qty: 60 TABLET | Refills: 11 | Status: CANCELLED | OUTPATIENT
Start: 2024-04-05 | End: 2025-04-05

## 2024-04-05 RX ORDER — DEXTROMETHORPHAN HYDROBROMIDE, GUAIFENESIN 5; 100 MG/5ML; MG/5ML
650 LIQUID ORAL 2 TIMES DAILY
COMMUNITY

## 2024-04-05 RX ORDER — CLONAZEPAM 0.5 MG/1
0.5 TABLET ORAL 2 TIMES DAILY PRN
Qty: 60 TABLET | Refills: 0 | Status: SHIPPED | OUTPATIENT
Start: 2024-04-05 | End: 2024-05-13 | Stop reason: SDUPTHER

## 2024-04-05 NOTE — PROGRESS NOTES
Social Work Clinic Appointment Notes    Date of Appointment: 04/05/2024    Patient's Name: Christine    Caregiver's Name (if applicable): Aliza, Daughter    Today,  met with patient and her daughter during patient's neurology appointment. SW introduced herself as a care management resource and discussed potential resources, including care sitting agencies, caregiver technology products, and caregiver support groups.     During the appointment, the patient's caregiver voiced concern that the past two months have been very difficult for the patient; however, she is hopeful that patient's new medications will manage her seizure like activity and improve the patient's quality of life. She is also appreciative to have connected with the neuro clinic again.    [facility] will send the following resources to the patient's caregiver through [facility]:  Caregiver Support Groups  Caregiver Technology and Product Guide  Caresitting Agencies on the [facility]  Fall Risk Tips  Tips for Bathing and Hygiene     provided contact information, encouraging the family to reach out with any care management questions or concerns.

## 2024-04-11 PROBLEM — F32.2 MAJOR DEPRESSIVE DISORDER, SINGLE EPISODE, SEVERE: Status: ACTIVE | Noted: 2024-04-11

## 2024-04-11 PROBLEM — F33.9 DEPRESSION, RECURRENT: Status: ACTIVE | Noted: 2024-04-11

## 2024-04-18 ENCOUNTER — PATIENT MESSAGE (OUTPATIENT)
Dept: NEUROLOGY | Facility: CLINIC | Age: 79
End: 2024-04-18
Payer: MEDICARE

## 2024-04-18 NOTE — PROGRESS NOTES
[facility]  [facility]     PATIENT: Christine Garcia  VISIT DATE: 2024  MRN: [id number]  PRIMARY PROVIDER: BRENDEN Bailey Jr., MD  : 1945       Chief complaint: Discuss medications      History of present illness:   The 78-year-old right-handed female patient, accompanied by her daughter, attended a follow-up appointment for FTLD with parkinsonism.     She was last seen in the clinic on 2024, and was prescribed clonazepam 0.5 mg twice daily to manage myoclonic activity. Since starting clonazepam, her daughter has observed increased drowsiness. The patient takes her bedtime medications between 1830 and 1900 hours and wakes up between 0400 and 0500 hours. Her daughter, who is not the primary caregiver, is unable to confirm if there has been a reduction in myoclonic activity. They have been engaging in more activities together, such as playing catch. The patient continues to experience urinary incontinence, though it has slightly improved as she is no longer soaking the sheets as before. Despite this, her daughter does not believe that oxybutynin is effective. Following an inability to tolerate Myrbetriq, palliative care conducted a urinalysis and culture on 2024, which showed trace blood and +2 leukocytes. Due to concerns about the cognitive effects of anticholinergic drugs like oxybutynin, her daughter would like to discontinue its use. They had a virtual consultation with palliative care yesterday, during which Dr. Villanueva recommended discontinuing oxybutynin.    The daughter wishes to stop oxybutynin immediately and reduce the evening dose of clonazepam to 0.5 mg at bedtime, considering omitting the morning dose due to the patient's drowsiness.    Onset/Duration:  Sep 2014 (~8-year)  First Symptom:  Memory impairment  Progression:  Gradually Progressive  Known Relevant Family history:  Father lived into his early 90s without any significant medical  problems  Mother lived into her late 80s with some mild dementia towards the last 2-3 years of her life however the but otherwise non bothersome.  All 3 children have ADHD like symptoms  Grandaunt possibly suggestive for bipolar disorder  Brother has childhood epilepsy  Neurocognitive Disorder:  Mother lived into her late 80s with some mild dementia towards the last 2-3 years of her life however the but otherwise non bothersome.  Movement Disorder:  The family denies a history of movement disorder (PD, PDD, tremor, etc).  Motorneuron Disorder:  The family denies a history of motor neuron disease (ALS).  Developmental Disorder:  All 3 children have ADHD like symptoms  Psychiatric Disorder:  Grandaunt possibly suggestive for bipolar disorder  Known Relevant Genetics:  Negative for [employer] testing of 33 genes selected ALS2 ANG ANXA11 DAMARIS CHCHD10 CHMP2B DCTN1 ERBB4 FUS GRN HEXA QVSITI5P7 ITM2B KIF5A MAPT OPTN PFN1 PRNP PSEN1 PSEN2 SETX SNCA SOD1 SORL1 SPG11 SQSTM1 TARDBP TBK1 TFG TREM2 UBQLN2 VAPB VCP  Developmental Milestones:  The patient/family report no known birth complications or early life problems. The patient met all developmental milestones.  Education/Learning Capacity:  The patient/family report no signs or symptoms suggestive of developmental learning disorder.  HS.  Estimated Educational Experience: 12 years of formal education.  Social History:  Lives with  and daughter. Patient has regular caretakers/sitters.  Career/Skill [place]:  Patient was an [employer] grade after [employer]. 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 She continues to [employer].  Retired/Quit: 0  Relevant Medical History:  Hypertension  Major depressive disorder Since 2017 - previous medications include Prozac, citalopram  Hyperlipidemia  Migraine headaches  Patent  foramen ovale  Obesity  obstructive sleep apnea non-complaint on CPAP     Review of cognitive, visuospatial, motor, sensory, and behavioral systems:     Memory:   The patient's memory has worsened in the past few years.  She does repeat statements or asks the same question repeatedly.  She does have difficulty remembering recent important conversations.  She does have difficulty remembering recent events.  She does forget information within minutes.  Her recent retrograde memory is impaired.  Her remote memory is intact.  Attention:   The patient's attention and concentration are impaired.  She does  have attentional fluctuations.  She does have difficulty with selective attention.  She does become easily distracted.  She does have difficulty with divided attention.  Executive:   The patient's cognitive processing speed is slower.  She does have difficulty with working memory.  She does misplace personal items (e.g., keys, cell phone, wallet) more frequently.  She does have difficulty keeping track of her medications.  She does have difficulty with planning/organizing/completing multistep tasks.  She does have difficulty with executive attention.  She does not have difficulty with flexible thinking.  She does not difficulty with self control.  She is exhibiting new symptoms that suggest they have become more impulsive, rash and/or careless.  Her judgment is impaired.  Language:   The patient's speech is  affected.  She does forget people's names more frequently.  She does  have word-finding difficulties.  Her speech is non-fluent and effortful.  Her speech is not grammatically intact.  She does make word substitutions.  She does  have difficulty reading.  She does appear to have impaired comprehension.  Visuospatial:   The patient does appear to have  visuospatial difficulty.  She has become confused or disoriented in *new*, unfamiliar places.  She does have trouble navigating.  She does not get lost in familiar  "places.  She does have visuospatial disorientation.  She does not have difficulty recognizing objects or faces.  Motor/Coordination:   The patient does  have difficulty with walking.  She does not feel imbalanced.  She has had recent and repeated falls.   She does not appear to have new muscle weakness.  She does have difficulty buttoning shirts, operating zippers, or manipulating tools/utensils.  Her handwriting has not become micrographic.  She does not have a resting tremor.  She exhibits  involuntary movements and/or muscle jerking.  She does not have swallowing difficulty.  She denies new muscle cramps and twitching.  Sensory:   The patient denies new numbness, tingling, paresthesias, or pain.  The patient denies a loss of vision, blurry vision, or double vision.  The patient denies new loss of hearing or worsening tinnitus.  The patient does have anosmia.  Sleep:   The patient reports difficulty sleeping. Comment: non-compliant with CPAP.   The patient does have difficulty going to sleep.-   The patient reports difficulty staying asleep and/or frequently awakening at night.  The patient does snore and/or have witnessed apneas while sleeping.   When she wakes up in the morning and is not l well-rested.  She has been reported to have dream-enactment behavior.   She denies symptoms suggestive of restless leg syndrome.  Behavior:   The patient's personality has changed.  She does have symptoms of disinhibition and social inappropriateness.  She is exhibiting symptoms to suggest a loss of manners and/or decorum.  She does not have apathy and/or decreased motivation. Comment:  She mostly sits in her  walk closet and sit and " rock" in a chair with her head down.   She does appear to have had a change in behavioral/emotional inertia.  The patient's emotional expression has changed.  She does have emotional blunting or lability.  She does have symptoms of irritability and mood lability. Comment: is crying more now. " "  She has been reported to have new symptoms of agitation, aggression, or violent outbursts. Comment: will become more irritable especially if she doesn't want to bathe but is not exhibiting physical aggression.    Her insight into his disease and situation is impaired.  Her personal hygiene is intact.  She is exhibiting a diminished response to other people's needs and feelings.  .  She is exhibiting diminished social interest, interrelatedness, and/or personal warmth.  She does appear restless.  She does exhibit and/or worsening simple repetitive behaviors- she has a hx of skin picking but not as frequent as before  Ser speech exhibits simplified or become repetitive/stereotyped.  She reports symptoms that are suggestive of new/worsening complex repetitive/ritualistic compulsions and behaviors.  She does not have symptoms of hyper-religiosity or dogmatism.  Her interests/pleasures have not become restrictive, simplified, interrupting, or repetitive.  She is exhibiting symptoms to suggest an increase in self-stimulating behavior.   She has had changes in eating behavior.   She has been exhibited symptoms to suggest increased consumption of food and/or alcohol/cigarettes.  Daughter reports oral exploration or consumption of inedible objects- tried eating a "bath bomb"  Psychiatric:   She does feel depressed.  tearful at times without a clear cause.   She is exhibiting symptoms of social withdrawal/indifference.  She does have anxiety.   She does exhibit cycling behavior.  She does exhibit hyperactive behavior.  She is not exhibited symptoms of paranoia.  She does have delusions.  She does have hallucinations. - She is mostly non-verbal and unable to verbalize if she is having hallucinations.    She does have a history of sensitivity to neuroleptic/psychotropic medications.  Medical Review of Systems:   The patient does not have constipation.  The patient does have urinary incontinence. Comment: urinary has worsened " since last visit 2023 and is totally incontinent.   The patient reports symptoms that are suggestive of orthostatic lightheadedness.  The patient's weight is stable. Comment: weight is up 2 lbs since 11/23.  Functional status:  Difficulty performing the following Instrumental ADLs:  Household chores: Yes  Food Preparation: Yes  Shopping: Yes  Ability to Handle Finances: Yes  Transportation/Driving: Yes  Household Appliances/Stove: Yes  Laundry: Yes  Difficulty performing the following Basic ADLs:  Dressing: No  Bathing: yes  Toileting: yes  Personal hygiene and grooming: yes  Feeding: yes  Care Management:  Patient/Family Safety Concerns:  Medication Adherence: Yes  Home Safety: No  Wandered: No  Firearms: No  Fall Risk: yes  Home Alone: No          Past Medical History:   Diagnosis Date    Alzheimer's disease     Anxiety     Depression     Dyslipidemia 10/02/2015    Fall 11/02/2020    with fracture    Hyperlipidemia     Hypertension     minimal    Migraines     Obesity 10/02/2015    Paranoia     Patent foramen ovale 10/02/2015       Past Surgical History:   Procedure Laterality Date    COLONOSCOPY N/A 1/18/2017    Procedure: COLONOSCOPY;  Surgeon: Gerber Naylor MD;  Location: [facility];  Service: Endoscopy;  Laterality: N/A;    HYSTERECTOMY      KNEE ARTHROPLASTY Bilateral 06/16/2020    meniscus repair    left elbow      left knee      OOPHORECTOMY      OPEN REDUCTION AND INTERNAL FIXATION (ORIF) OF INJURY OF ANKLE Right 11/3/2020    Procedure: ORIF, ANKLE;  Surgeon: Ted Rome MD;  Location: [facility] OR;  Service: Orthopedics;  Laterality: Right;       Family History   Problem Relation Name Age of Onset    Diabetes Father      Heart disease Father      COPD Father      Lung cancer Brother      Cancer Sister         Social History     Socioeconomic History    Marital status:      Spouse name: josé    Number of children: 3   Tobacco Use    Smoking status: Former     Current packs/day: 0.00      Average packs/day: 1 pack/day for 20.0 years (20.0 ttl pk-yrs)     Types: Cigarettes     Start date:      Quit date:      Years since quittin.3    Smokeless tobacco: Never    Tobacco comments:     1-2ppd   Substance and Sexual Activity    Alcohol use: Yes     Alcohol/week: 1.0 standard drink of alcohol     Types: 1 Shots of liquor per week     Comment: occ    Drug use: Never    Sexual activity: Yes     Partners: Male     Birth control/protection: Post-menopausal, None     Social Determinants of Health     Financial Resource Strain: Low Risk  (2023)    Overall Financial Resource Strain (CARDIA)     Difficulty of Paying Living Expenses: Not hard at all   Food Insecurity: No Food Insecurity (2023)    Hunger Vital Sign     Worried About Running Out of Food in the Last Year: Never true     Ran Out of Food in the Last Year: Never true   Transportation Needs: No Transportation Needs (2023)    PRAPARE - Transportation     Lack of Transportation (Medical): No     Lack of Transportation (Non-Medical): No   Physical Activity: Inactive (2023)    Exercise Vital Sign     Days of Exercise per Week: 0 days     Minutes of Exercise per Session: 0 min   Stress: Stress Concern Present (2023)    [employer] - Occupational Stress Questionnaire     Feeling of Stress : Very much   Social Connections: Moderately Integrated (2023)    Social Connection and Isolation Panel [NHANES]     Frequency of Communication with Friends and Family: Never     Frequency of Social Gatherings with Friends and Family: Once a week     Attends Mu-ism Services: More than 4 times per year     Active Member of Clubs or Organizations: No     Attends Club or Organization Meetings: More than 4 times per year     Marital Status:    Housing Stability: Low Risk  (2023)    Housing Stability Vital Sign     Unable to Pay for Housing in the Last Year: No     Number of Places Lived in the  Last Year: 1     Unstable Housing in the Last Year: No       Medication:     Current Outpatient Medications on File Prior to Visit   Medication Sig Dispense Refill    acetaminophen (TYLENOL) 650 MG TbSR Take 650 mg by mouth 2 (two) times a day.      acetylcysteine, bulk, Powd TAKE 2 TABLETS (1200MG) DAILY FOR 3 WEEKS, THEN TAKE 4 TABLETS DAILY (Patient taking differently: Patient taked 2000 mg in am and 2000 mg in pm) 120 g 1    aspirin (ECOTRIN) 81 MG EC tablet Take 81 mg by mouth once daily.      clonazePAM (KLONOPIN) 0.5 MG tablet Take 1 tablet (0.5 mg total) by mouth 2 (two) times daily as needed for Anxiety. 60 tablet 0    CMPD hydrocortisone 2%- LIDOcaine 3% suppository (RECTAL ROCKET) Place 1 suppository rectally every evening. Insert 1 suppository 3/4 of the way onto rectum. Wet for easier application. Repeat for 3 nights. (Patient not taking: Reported on 4/5/2024) 6 suppository 1    donepeziL (ARICEPT) 10 MG tablet Take 1 tablet (10 mg total) by mouth every morning. 90 tablet 3    lamoTRIgine (LAMICTAL) 25 MG tablet Take 2 tablets (50 mg total) by mouth 2 (two) times daily. 360 tablet 3    linaCLOtide (LINZESS) 72 mcg Cap capsule Take 1 capsule (72 mcg total) by mouth before breakfast. 30 capsule 0    LINZESS 72 mcg Cap capsule TAKE 1 CAPSULE (72 MCG TOTAL) BY MOUTH BEFORE BREAKFAST (Patient not taking: Reported on 4/5/2024) 30 capsule 0    midodrine (PROAMATINE) 5 MG Tab Take 2 tablets (10 mg total) by mouth 3 (three) times daily with meals. 540 tablet 1    midodrine (PROAMATINE) 5 MG Tab Take 2 tablets by mouth 3 times a day with meals (Patient not taking: Reported on 4/5/2024) 180 tablet 0    modafiniL (PROVIGIL) 200 MG Tab Take 1 tablet (200 mg total) by mouth once daily. 30 tablet 0    oxybutynin (DITROPAN-XL) 10 MG 24 hr tablet Take 1 tablet (10 mg total) by mouth once daily. 90 tablet 1    rosuvastatin (CRESTOR) 20 MG tablet TAKE 1 TABLET BY MOUTH EVERY DAY 90 tablet 3    suvorexant (BELSOMRA) 20 mg  Tab TAKE 1 TABLET BY MOUTH EVERY DAY IN THE EVENING 90 tablet 1    UNABLE TO FIND       UNABLE TO FIND       UNABLE TO FIND        No current facility-administered medications on file prior to visit.        Review of patient's allergies indicates:   Allergen Reactions    Sucrose Other (See Comments)     GI issues -    Myrbetriq [mirabegron] Other (See Comments)     Moderate to severe dizziness        Medications Reconciliation:   I have reconciled the patient's home medications and discharge medications with the patient/family. I have updated all changes.  Refer to After-Visit Medication List.    Objective:  Vital Signs:  There were no vitals filed for this visit.  Wt Readings from Last 3 Encounters:   04/05/24 1120 72 kg (158 lb 9.9 oz)   11/13/23 1035 70.9 kg (156 lb 4.8 oz)   10/18/23 1516 72 kg (158 lb 11.2 oz)     There is no height or weight on file to calculate BMI.           Neurological examination:  Mental Status:   Her appearance was abnormal.   Comment: looks older than stated age;  Throughout the interview, she behaved abnormally and was cooperative. ;  Her behavior was inappropriate to the clinical context and situation.   Comment: child-like, tearful.   The patient's energy level is abnormal.   Comment: Fluctuating;  Her orientation is not entirely accurate.  Her attention/concentration is impaired.  She is unable to complete three-step commands without making errors.  Her fund of knowledge was less than what would be expected given age, culture and level of education.  Her thought process is not logical or goal-oriented. ;  She demonstrated impaired insight based on actions, awareness of her illness, plans for the future.  She demonstrated impaired judgment based on actions and plans for the future.  She did not exhibit evidence of hallucinations.  She did not exhibit evidence of delusions.  Cranial Nerves:   Her pupils were normal.  Her blink rate was abnormal.   Comment: decreased;  Her facial  strength was normal.  Her facial expression was symmetric and appropriate to the context.  She does not require the use of hearing aids.  Her tongue showed no evidence of scalloping.  She tongue movement with normal.  She had no significant evidence of anterocollis or retrocollis.  Speech/Language:   The patient's speech was not completely non-fluent and non-effortful.  Her speech timbre is abnormal.   Comment: quiet;  Her speech rate is abnormal.  Her respirations are within normal range and appropriate to context.  Her speech timbre is normal.  She has articulation (segmental features) errors.  Her tone is emotionally limited, and her tempo was arrhythmic.   Comment: monotone;  The patient's speech is  dysarthric.  The patient's speech exhibited evidence of anomia.  SThere is  myoclonus observed in the left upper and lower extremities.   There are no fasciculations observed in The patient's bilateral upper and lower extremities.  Coordination:   She has evidence of akathisia.   Higher Cortical Function:   The patient showed evidence of apraxia.  She showed dysexecutive behavior.  She has evidence of perseverative or stereotyped behavior.  She has no stimulus-bound behavior.    Laboratories:     Lab Date Value [Reference]   Autoimmune/Paraneoplastic Screening           GIANNI Screen 2021, Sep-20  2021, Sep-20  2021, Sep-20    None Detected [None Detected]  None Detected [None Detected]  None Detected [None Detected]      Sed Rate 2021, Sep-20  2021, Sep-20  2021, Sep-20    7 [0 - 29 mm/Hr]  7 [0 - 29 mm/Hr]  7 [0 - 29 mm/Hr]      Metabolic Screening   Hemoglobin A1C External 09/20/2021  5.2 [4.0 - 5.6 %]  5.2 [4.0 - 5.6 %]  5.2 [4.0 - 5.6 %]      Homocysteine 06/13/2020  5.0 [4.0 - 15.5 umol/L]  5.0 [4.0 - 15.5 umol/L]  5.0 [4.0 - 15.5 umol/L]      TSH 04/18/20202020, Apr-18    1.500 [0.40 - 4.00 uIU/mL]  1.760 [0.40 - 4.00 uIU/mL]      Glucose 2021, Apr-28    105 [70 - 110 mg/dL]      Albumin 2021, Apr-28 2021,  Apr-28 2021, Jan-27    4.2 [3.5 - 5.2 g/dL]  4.2 [3.5 - 5.2 g/dL]  4.6 [3.5 - 5.2 g/dL]      Alkaline Phosphatase 2021, Apr-28 2021, Apr-28 2021, Jan-27    108 [38 - 145 U/L]  108 [38 - 145 U/L]  106 [38 - 145 U/L]      ALT 2021, Apr-28 2021, Apr-28 2021, Jan-27    25 [0 - 35 U/L]  25 [0 - 35 U/L]  26 [0 - 35 U/L]      AST 2021, Apr-28 2021, Apr-28 2021, Jan-27    32 [14 - 36 U/L]  32 [14 - 36 U/L]  37 (H) [14 - 36 U/L]      BILIRUBIN TOTAL 2021, Apr-28 2021, Apr-28 2021, Jan-27    0.9 [0.2 - 1.3 mg/dL]  0.9 [0.2 - 1.3 mg/dL]  0.9 [0.2 - 1.3 mg/dL]      PROTEIN TOTAL 2021, Apr-28 2021, Apr-28 2021, Jan-27    7.0 [6.0 - 8.4 g/dL]  7.0 [6.0 - 8.4 g/dL]  7.7 [6.0 - 8.4 g/dL]      Cholesterol 2021, Oct-28    177 [120 - 199 mg/dL]      HDL 2021, Oct-28    51 [40 - 75 mg/dL]      Non-HDL Cholesterol 2021, Oct-28    126 [mg/dL]      Triglycerides 04/18/2020  281 (H) [30 - 150 mg/dL]      Folate 04/18/2020  >20.0 [4.0 - 24.0 ng/mL]      Thiamine 2020, Apr-18    142 [70 - 180 nmol/L]      Vit D, 25-Hydroxy 04/18/2020  84 [30 - 96 ng/mL]  84 [30 - 96 ng/mL]  84 [30 - 96 ng/mL]      Vitamin B-12 2020, Apr-18    798 [210 - 950 pg/mL]      Vitamin B2 2020, Apr-18    15 [5 - 50 nmol/L]      Vitamin B6 2020, Jun-13 2020, Apr-18    397.3 (H) [20.0 - 125.0 nmol/L]  300.6 (H) [20.0 - 125.0 nmol/L]      Infectious Disease/Immunocompromised Screening   SARS-CoV-2 RNA, Amplification, Qual 2020, Nov-02    Negative      Standard Hematology Screen   Hematocrit 2021, Jan-27    44.4 [37.0 - 48.5 %]      Hemoglobin 2021, Jan-27    14.9 [12.0 - 16.0 g/dL]      MCV 2021, Jan-27    88 [82 - 98 fL]      Platelets 2021, Jan-27    261 [150 - 350 K/uL]      Neuroendocrine/Electrolyte Screening   BUN 2021, Apr-28    20 (H) [7 - 18 mg/dL]      Chloride 2021, Apr-28    106 [95 - 110 mmol/L]      Creatinine 2021, Apr-28    0.84 [0.5 - 1.4 mg/dL]      Potassium 2021, Apr-28    4.6 [3.5 - 5.1 mmol/L]      Sodium 2021, Apr-28    142 [136 - 145  mmol/L]      Delirium Screening   Bacteria, UA 2021, Dec-10    Few (A) [Negative /hpf]      Glucose, UA 2021, Dec-10    Negative      Ketones, UA 2021, Dec-10    Negative      Leukocytes, UA 2021, Dec-10    Negative      NITRITE UA 2021, Dec-10    Negative      Protein, UA 2021, Dec-10    Negative      RBC, UA 2021, Dec-10    0 [0 - 4 /hpf]      WBC, UA 2021, Dec-10    1 [0 - 5 /hpf]              Assessment:     The patient, a 78-year-old right-handed female, has a complex medical history that includes FTLD, DLB, dysautonomia, hypertension, hyperlipidemia, migraine headaches, Major Depressive Disorder (MDD), Obstructive Sleep Apnea (ERENDIRA), and non-compliance with CPAP therapy.     Clinical assessments classify her dementia as moderate, with a Clinical Dementia Rating-Sum of Boxes (CDR-SOB) score of 4.5. She is fully dependent in instrumental activities of daily living, as indicated by a [employer]-Sky Instrumental Activities of Daily Living (iADL) score of 8/8. Over the past eight years, she has shown a gradual progression in executive-predominant, multi-domain major cognitive impairment, which has significantly impaired her daily activities and escalated to a moderate-to-severe level of dementia. The current diagnosis is frontotemporal degeneration with parkinsonism. Recent exacerbations in her condition include pseudobulbar affect, worsening myoclonus, and dyskinesias. In response to these developments, we are focusing on quality of life measures and coordinating palliative care services. For symptomatic management of the dyskinesias and myoclonus, clonazepam has been recommended, and the family has expressed agreement with this approach.     Care Management Plan:    #Optimize Neurocognitive Impairment and Quality  continue the MIND Diet and other lifestyle behavior that may help maintain brain health.  Continue donepezil 10 mg daily  Continue  clonazepam 0,5 mg QHS x 1 week and will decrease morning dose to 0.25 mg  or hold if she is to drowsy.   Continue belsomra 20 mg at bedtime.    #Optimize Behavioral Management and Quality.  Continue Lamictal po bid.   #Optimize Sleep Hygiene and Quality.  Continue Belsomra 20 mg at night.  Encouraged use of CPAP .   #Optimize Cerebrovascular Health.  The patient has a documented history of hyperlipidemia and/or hypercholesteremia with long-term complications such as cerebrovascular disease, peripheral vascular disease, and/or aortic atherosclerosis. Collectively these risk factors may contribute to cerebral atherosclerosis, and cerebral hypoperfusion compounded neurocognitive disorder. We discussed maximizing cerebrovascular-related medical therapy, including but not limited to cholesterol medications and antiplatelet agents. We have discussed the value of aggressively controlling vascular risk factors like hypertension, hyperlipidemia, and Diabetes SBP<130, LDL<100, and A1C<7.0. We discussed the need to optimize lifestyle choices, including a heart-healthy diet (e.g., Mediterranean or DASH), increased cardiovascular exercise (goal 150 minutes of moderate-intensity per week), and staying cognitively and socially active.  #Optimize Movement Disorder and Quality.  Continue acetylcysteine 1800 mg daily  Continue Lamictal 50 mg BID  #Optimize Pain Disorder and Quality.  continue tylenol 500mg TID as needed for pain.   #Coordination of Care Management Planning.  Continue with palliative care as scheduled.   #Behavioral/Environmental Strategies  We recommend engaging in activities that stimulate cognitively and socially while avoiding excessive stimulation and fatigue in overwhelmingly complex situations.  We recommend integrating routine and schedule into your daily life. https://www.alzheimersproject.org/news/the-importance-of-routine-and-familiarity-to-persons-with-dementia/  #Health Maintenance/Lifestyle Advice  We have discussed the value in aggressively controlling vascular risk factors  like hypertension, hyperlipidemia, and Diabetes SBP<130, LDL<100, A1C<7.0.  We discussed the need to optimize lifestyle choices including a heart-healthy diet (e.g., Mediterranean or DASH), increased cardiovascular exercise (goal 150 minutes of moderate-intensity per week), and stay cognitively and socially active.  #Support  We all need support sometimes. Get easy access to local resources, community programs, and services. https://www.communityresourcefinder.org/  Learn more about Cognitive Impairment in [place]: https://www.alz.org/professionals/public-health/state-overview[place]  Learn more about your local community's dementia and neurocognitive impairment resources in [place]: https://www.alz.org/[place]/helping_you  Learn more about your local support for dementia: https://www.alz.org/[place]/helping_you/support  Learn more about housing support for people living with dementia in [place]: https://www.[employer]/memory-care-in-[place]/  Learn more about financial support for people living with dementia in [place]: https://www.[employer]/financial-assistance/  Learn more about caregiver support with the Family Caregiver [place]: http://www.caregiver.org  #Safety  Individuals living with Alzheimer's disease and other dementias are at increased risk for injury or harm in certain areas of the home. As the disease progresses, they may become unaware of the dangers that exist. Consider taking the following precautions to create a safe environment that can prevent dangerous situations from occurring and help maximize independence for as long as possible. https://www.alz.org/help-support/resources/home_safety_checklist  [place] has no laws against driving with dementia specifically but obviously has laws about medical conditions which impact a person's ability to drive safely. If you believe your loved one's driving capacity has diminished, please reach out to either your primary  "care physician or our office to discuss driving restrictions or revocation of their license. To learn more: https://www.dementiacarecentral.com/caregiverinfo/driving-problems/  The Alzheimer's Association administers the nationwide "Safe Return" program with identification bracelets, necklaces, or clothing tags and 24-hour assistance. More information is available online at https://www.alz.org/help-support/caregiving/safety/medicalert-with-24-7-wandering-support  #Follow up:  Follow-up in two week for medication follow-up/titration or sooner if needed.     Thank you for allowing us to participate in the care of your patient. Please do not hesitate to contact us with any questions or concerns.     It was a pleasure seeing The patient and we look forward to seeing them at their follow-up visit.     This note is dictated on M*Modal Fluency Direct word recognition program. There are word recognition mistakes that are occasionally missed on review.         Scheduled Follow-up :  Future Appointments   Date Time Provider Department Center   4/19/2024 10:15 AM Gill Matias NP [facility] NEUR[facility] [place]       After Visit Medication List :     Medication List            Accurate as of April 18, 2024  4:01 PM. If you have any questions, ask your nurse or doctor.                CHANGE how you take these medications      acetylcysteine (bulk) Powd  TAKE 2 TABLETS (1200MG) DAILY FOR 3 WEEKS, THEN TAKE 4 TABLETS DAILY  What changed: additional instructions            CONTINUE taking these medications      acetaminophen 650 MG Tbsr  Commonly known as: TYLENOL     aspirin 81 MG EC tablet  Commonly known as: ECOTRIN     BELSOMRA 20 mg Tab  Generic drug: suvorexant  TAKE 1 TABLET BY MOUTH EVERY DAY IN THE EVENING     clonazePAM 0.5 MG tablet  Commonly known as: KlonoPIN  Take 1 tablet (0.5 mg total) by mouth 2 (two) times daily as needed for Anxiety.     CMPD HYDROCORTISONE 2%, LIDOCAINE 3% SUPPOSITORY (RECTAL ROCKET)  Place 1 suppository " rectally every evening. Insert 1 suppository 3/4 of the way onto rectum. Wet for easier application. Repeat for 3 nights.     donepeziL 10 MG tablet  Commonly known as: ARICEPT  Take 1 tablet (10 mg total) by mouth every morning.     lamoTRIgine 25 MG tablet  Commonly known as: LAMICTAL  Take 2 tablets (50 mg total) by mouth 2 (two) times daily.     * LINZESS 72 mcg Cap capsule  Generic drug: linaCLOtide  TAKE 1 CAPSULE (72 MCG TOTAL) BY MOUTH BEFORE BREAKFAST     * linaCLOtide 72 mcg Cap capsule  Commonly known as: LINZESS  Take 1 capsule (72 mcg total) by mouth before breakfast.     * midodrine 5 MG Tab  Commonly known as: PROAMATINE  Take 2 tablets (10 mg total) by mouth 3 (three) times daily with meals.     * midodrine 5 MG Tab  Commonly known as: PROAMATINE  Take 2 tablets by mouth 3 times a day with meals     modafiniL 200 MG Tab  Commonly known as: PROVIGIL  Take 1 tablet (200 mg total) by mouth once daily.     oxybutynin 10 MG 24 hr tablet  Commonly known as: DITROPAN-XL  Take 1 tablet (10 mg total) by mouth once daily.     rosuvastatin 20 MG tablet  Commonly known as: CRESTOR  TAKE 1 TABLET BY MOUTH EVERY DAY     UNABLE TO FIND     UNABLE TO FIND     UNABLE TO FIND           * This list has 4 medication(s) that are the same as other medications prescribed for you. Read the directions carefully, and ask your doctor or other care provider to review them with you.                  Signing Physician:  Gill Matias NP    Billing:        -----------------------------------------------------------------------------  II spent a total of 52 minutes (from 10:25 AM to 11:17 AM) on the day of clinical evaluation, engaging in person in a face-to-face consultation with . More than 50% of this time was devoted to counseling on symptoms, treatment plans, risks, therapeutic options, lifestyle modifications, and safety concerns related to the above diagnoses.  A Review of Systems was completed, encompassing 10 of the 14  systems. All findings were negative, with the exception of those noted in the History of Present Illness (HPI). The systems reviewed were Constitutional (Const), Eyes, Ear/Nose/Throat (ENT), Respiratory (Resp), Cardiovascular (CV), Gastrointestinal (GI), Genitourinary (), Musculoskeletal (MSK), Skin, and Neurological (Neuro).  I spent a total of 10 minutes reviewing and summarizing records from outside physicians on the day of the clinical evaluation. This review and summary were conducted as described in the History of Present Illness (HPI).  Total Billing time spent on encounter/documentation for this patient's evaluation and management: 62 minutes.

## 2024-04-19 ENCOUNTER — OFFICE VISIT (OUTPATIENT)
Dept: NEUROLOGY | Facility: CLINIC | Age: 79
End: 2024-04-19
Payer: MEDICARE

## 2024-04-19 DIAGNOSIS — G31.09 FRONTOTEMPORAL LOBAR DEGENERATION: Primary | ICD-10-CM

## 2024-04-19 DIAGNOSIS — F02.80 FRONTOTEMPORAL LOBAR DEGENERATION: Primary | ICD-10-CM

## 2024-04-19 DIAGNOSIS — G31.83 LEWY BODY DEMENTIA WITH BEHAVIORAL DISTURBANCE: ICD-10-CM

## 2024-04-19 DIAGNOSIS — F02.818 LEWY BODY DEMENTIA WITH BEHAVIORAL DISTURBANCE: ICD-10-CM

## 2024-04-19 PROCEDURE — 99215 OFFICE O/P EST HI 40 MIN: CPT | Mod: 95,,, | Performed by: NURSE PRACTITIONER

## 2024-04-29 DIAGNOSIS — R53.83 FATIGUE, UNSPECIFIED TYPE: ICD-10-CM

## 2024-04-29 RX ORDER — MODAFINIL 200 MG/1
200 TABLET ORAL DAILY
Qty: 30 TABLET | Refills: 0 | Status: SHIPPED | OUTPATIENT
Start: 2024-04-29 | End: 2024-05-24 | Stop reason: SDUPTHER

## 2024-05-13 ENCOUNTER — PATIENT MESSAGE (OUTPATIENT)
Dept: NEUROLOGY | Facility: CLINIC | Age: 79
End: 2024-05-13
Payer: MEDICARE

## 2024-05-13 RX ORDER — DONEPEZIL HYDROCHLORIDE 10 MG/1
10 TABLET, FILM COATED ORAL EVERY MORNING
Qty: 90 TABLET | Refills: 3 | Status: SHIPPED | OUTPATIENT
Start: 2024-05-13 | End: 2025-05-13

## 2024-05-13 RX ORDER — CLONAZEPAM 0.5 MG/1
0.5 TABLET ORAL 2 TIMES DAILY PRN
Qty: 30 TABLET | Refills: 2 | Status: SHIPPED | OUTPATIENT
Start: 2024-05-13 | End: 2025-05-13

## 2024-05-14 RX ORDER — CLONAZEPAM 0.5 MG/1
0.5 TABLET ORAL 2 TIMES DAILY PRN
Qty: 60 TABLET | Refills: 0 | Status: SHIPPED | OUTPATIENT
Start: 2024-05-14 | End: 2025-05-14

## 2024-05-22 ENCOUNTER — PATIENT MESSAGE (OUTPATIENT)
Dept: NEUROLOGY | Facility: CLINIC | Age: 79
End: 2024-05-22
Payer: MEDICARE

## 2024-05-24 DIAGNOSIS — N32.81 OVERACTIVE BLADDER: ICD-10-CM

## 2024-05-24 DIAGNOSIS — F02.B18 OTHER MODERATE FRONTOTEMPORAL DEMENTIA WITH OTHER BEHAVIORAL DISTURBANCE: Primary | ICD-10-CM

## 2024-05-24 DIAGNOSIS — G31.09 OTHER MODERATE FRONTOTEMPORAL DEMENTIA WITH OTHER BEHAVIORAL DISTURBANCE: Primary | ICD-10-CM

## 2024-05-24 DIAGNOSIS — R53.83 FATIGUE, UNSPECIFIED TYPE: ICD-10-CM

## 2024-05-24 RX ORDER — MODAFINIL 200 MG/1
200 TABLET ORAL DAILY
Qty: 30 TABLET | Refills: 0 | Status: SHIPPED | OUTPATIENT
Start: 2024-05-24 | End: 2024-05-31

## 2024-05-24 RX ORDER — CLONAZEPAM 0.5 MG/1
0.5 TABLET ORAL 2 TIMES DAILY PRN
Qty: 60 TABLET | Refills: 0 | Status: CANCELLED | OUTPATIENT
Start: 2024-05-24 | End: 2025-05-24

## 2024-05-24 NOTE — TELEPHONE ENCOUNTER
"Spoke to pt daughter to gather more information about the refill request. Pt daughter stated she was just being proactive on her end for the refills that are due because she is leaving the country soon.  She also wanted me to ask Dr. Marian REYES if there is another alternative to the oxybutynin because she "may have had an adverse side effect" from it. She stated that one of her CG said she was extremely dizzy on it. I advised that I would send to Dr. Marian REYES to further advise on.  "

## 2024-05-24 NOTE — TELEPHONE ENCOUNTER
I sent the refill a while ago for this rx can you please call and find out what is going on with the patient and pharmacy, please

## 2024-05-28 NOTE — TELEPHONE ENCOUNTER
Palliative care started her on oxybutynin she can ask them to consider gemtesa bc she could not tolerate mybetriq

## 2024-05-31 ENCOUNTER — PATIENT MESSAGE (OUTPATIENT)
Dept: NEUROLOGY | Facility: CLINIC | Age: 79
End: 2024-05-31
Payer: MEDICARE

## 2024-05-31 DIAGNOSIS — F02.80 FRONTOTEMPORAL LOBAR DEGENERATION: ICD-10-CM

## 2024-05-31 DIAGNOSIS — R53.83 FATIGUE, UNSPECIFIED TYPE: ICD-10-CM

## 2024-05-31 DIAGNOSIS — G31.09 FRONTOTEMPORAL LOBAR DEGENERATION: ICD-10-CM

## 2024-05-31 RX ORDER — MODAFINIL 200 MG/1
200 TABLET ORAL
Qty: 30 TABLET | Refills: 3 | Status: SHIPPED | OUTPATIENT
Start: 2024-05-31

## 2024-05-31 NOTE — TELEPHONE ENCOUNTER
Part 2:    We started Clonazepam & Oxybutynin end of March. Incont. imprvd & stable. Did the following after call w/ Gill.      4/20: cut AM dose of Clon in 1/2 to see if helps w/ drowsiness      Waited 2 weeks to adj. Oxyb. Concern w/ Oxyb is increased confusion. Also not sure if improved incont. was from Clon. or Oxyb.      5/6: stop Oxyb entirely      Incont. returned. BUT during that wk she had multiple moments of clarity & clear connection w/ some communication.      5/13: resume Oxyb, but cut dose in 1/2 - 5mg in AM     Incont. imprvd, but still lots of urine breakthrough, just not as heavy. Sleep doesn't seem disrupted as it was before.     Do you have other options to help incont. w/p further impacting her cognition? Elizabeth for shorthand - it caps me.     Thanks!  Aliza

## 2024-06-01 ENCOUNTER — NURSE TRIAGE (OUTPATIENT)
Dept: ADMINISTRATIVE | Facility: CLINIC | Age: 79
End: 2024-06-01
Payer: MEDICARE

## 2024-06-01 NOTE — TELEPHONE ENCOUNTER
[place]    PCP:  Dr. BRENDEN Bailey Jr    Spoke with Dtr, Aliza Gallardo, on pts behalf.  She reports pts caregivers sent messages saying her BP is elevated.  1st: BP - 157/90 ME - 50 2nd: BP - 160/91 ME - 55 ~ 1 hr apart.  Dtr states pt takes Midodrine for Hypotension.  Dtr states that she has advanced Dementia so would be hard to tell if she is having any symptoms.  Dtr got caregiver on the phone with NT.  C/O high bp, shaking all over earlier lasting ~ 2-5 mins, incontinence after seizure activity (had to be cleaned up afterwards), caregiver reports pt was more relaxed after the seizure activity, and pale upon standing but gait is steady at this time.  Denies worsening confusion, difficulty breathing, HA, CP, dizziness, and unsteady gait.  Dtr reports that Neuro thinks pt may be having seizures.  Repeat BP: 159/105 ME: 53.  Per protocol, care advised is call PCP now.  Dtr VU.  Klonopin 0.25 mg in the am and 0.5 mg Q HS and Oxybutynin changed to 5 mg Q am.NT spoke with OCP, Dr. Narayanan.      OCP recommends:    1) If she has a seizure lasting longer than 5 mins, call EMS  2) Go to the ED if she gets worse, has a cluster of seizures, doesn't return to her baseline after the seizure, or has more than 1 seizure an hour  3) Call Neuro Monday for appt  4) Schedule an appt with her PCP for lab work and to make sure that she doesn't have an infection if Dtr is agreeable  5) Okay to hold Midodrine  6) If she has a seizure again, video her including her entire body along with her eyes so that her Physician can see the seizure, don't put anything in the her mouth, make sure that she doesn't hurt herself, and lay her down on her down on her Lt side    NT contacted pts Dtr and notified of OCP recommendations.  Dtr VU.  Advised to call for worsening/questions/concerns.  VU.    Reason for Disposition   Systolic BP  >= 160 OR Diastolic >= 100   [1] Caller has URGENT medicine question about med that PCP or specialist prescribed AND [2]  triager unable to answer question    Additional Information   Negative: Difficult to awaken or acting confused (e.g., disoriented, slurred speech)   Negative: SEVERE difficulty breathing (e.g., struggling for each breath, speaks in single words)   Negative: [1] Weakness of the face, arm or leg on one side of the body AND [2] new-onset   Negative: [1] Numbness (i.e., loss of sensation) of the face, arm or leg on one side of the body AND [2] new-onset   Negative: [1] Chest pain lasts > 5 minutes AND [2] history of heart disease (i.e., heart attack, bypass surgery, angina, angioplasty, CHF)   Negative: [1] Chest pain AND [2] took nitrogylcerin AND [3] pain was not relieved   Negative: Sounds like a life-threatening emergency to the triager   Negative: [1] Systolic BP  >= 160 OR Diastolic >= 100 AND [2] cardiac (e.g., breathing difficulty, chest pain) or neurologic symptoms (e.g., new-onset blurred or double vision, unsteady gait)   Negative: [1] Pregnant 20 or more weeks (or postpartum < 6 weeks) AND [2] new hand or face swelling   Negative: [1] Pregnant 20 or more weeks (or postpartum < 6 weeks) AND [2] Systolic BP >= 160 OR Diastolic >= 110   Negative: [1] Systolic BP  >= 200 OR Diastolic >= 120 AND [2] having NO cardiac or neurologic symptoms   Negative: [1] Pregnant 20 or more weeks (or postpartum < 6 weeks) AND [2] Systolic BP  >= 140 OR Diastolic >= 90   Negative: [1] Systolic BP  >= 180 OR Diastolic >= 110 AND [2] missed most recent dose of blood pressure medication   Negative: Systolic BP  >= 180 OR Diastolic >= 110   Negative: Ran out of BP medications   Negative: [1] Intentional drug overdose AND [2] suicidal thoughts or ideas   Negative: MORE THAN A DOUBLE DOSE of a prescription or over-the-counter (OTC) drug   Negative: [1] DOUBLE DOSE (an extra dose or lesser amount) of prescription drug AND [2] any symptoms (e.g., dizziness, nausea, pain, sleepiness)   Negative: [1] DOUBLE DOSE (an extra dose or lesser  amount) of over-the-counter (OTC) drug AND [2] any symptoms (e.g., dizziness, nausea, pain, sleepiness)   Negative: Took another person's prescription drug   Negative: [1] DOUBLE DOSE (an extra dose or lesser amount) of prescription drug AND [2] NO symptoms  (Exception: A double dose of antibiotics.)   Negative: Diabetes drug error or overdose (e.g., took wrong type of insulin or took extra dose)   Negative: [1] Prescription not at pharmacy AND [2] was prescribed by PCP recently (Exception: Triager has access to EMR and prescription is recorded there. Go to Home Care and confirm for pharmacy.)   Negative: [1] Pharmacy calling with prescription question AND [2] triager unable to answer question    Protocols used: Blood Pressure - High-A-, Medication Question Call-A-

## 2024-06-03 NOTE — TELEPHONE ENCOUNTER
Does patient need appt or will appt with Dr.James Villanueva suffice?    LOV 10/18/23    Routed to

## 2024-06-03 NOTE — TELEPHONE ENCOUNTER
Phill Villanueva MD Vo, Tommy1 hour ago (8:48 AM)       Schedule fu with me      Sabrina Stacey routed conversation to Lashaun Lopez DO2 hours ago (8:08 AM)      Nadeem Teran James F., MD23 hours ago (11:26 AM)     TV  Please advise. Looks like On-Call Nurse spoke with Eduardo.      Marine Cardoza, MELODY HARDING Staff; Lynn Orellana Staff; Jessica Wilks Staff2 days ago     TG  Spoke with Dtr, Aliza Gallardo, on pts behalf.  She reports pts caregivers sent messages saying her BP is elevated.  1st: BP - 157/90 VA - 50 2nd: BP - 160/91 VA - 55 ~ 1 hr apart.  Dtr states pt takes Midodrine for Hypotension.  Dtr states that she has advanced Dementia so would be hard to tell if she is having any symptoms.  Dtr got caregiver on the phone with NT.  C/O high bp, shaking all over earlier lasting ~ 2-5 mins, incontinence after seizure activity (had to be cleaned up afterwards), caregiver reports pt was more relaxed after the seizure activity, and pale upon standing but gait is steady at this time.  Denies worsening confusion, difficulty breathing, HA, CP, dizziness, and unsteady gait.  Dtr reports that Neuro thinks pt may be having seizures.  Repeat BP: 159/105 VA: 53.  Per protocol, care advised is call PCP now.  Dtr VU.  Klonopin 0.25 mg in the am and 0.5 mg Q HS and Oxybutynin changed to 5 mg Q am.NT spoke with OCP, Dr. Narayanan.      OCP recommends:    1) If she has a seizure lasting longer than 5 mins, call EMS  2) Go to the ED if she gets worse, has a cluster of seizures, doesn't return to her baseline after the seizure, or has more than 1 seizure an hour  3) Call Neuro Monday for appt  4) Schedule an appt with her PCP for lab work and to make sure that she doesn't have an infection if Dtr is agreeable  5) Okay to hold Midodrine  6) If she has a seizure again, video her including her entire body along with her eyes so that her Physician can see the seizure, don't put anything in the her mouth, make sure that she doesn't hurt  herself, and lay her down on her down on her Lt side    NT contacted pts Dtr and notified of OCP recommendations.  Dtr VU.  Please contact pts Daughter directly for any further advisement.    Thank you,  Triage Team

## 2024-06-05 RX ORDER — LAMOTRIGINE 25 MG/1
TABLET ORAL
Qty: 360 TABLET | Refills: 3 | Status: SHIPPED | OUTPATIENT
Start: 2024-06-05

## 2024-06-26 ENCOUNTER — PATIENT MESSAGE (OUTPATIENT)
Dept: NEUROLOGY | Facility: CLINIC | Age: 79
End: 2024-06-26
Payer: MEDICARE

## 2024-06-27 ENCOUNTER — PATIENT MESSAGE (OUTPATIENT)
Dept: NEUROLOGY | Facility: CLINIC | Age: 79
End: 2024-06-27
Payer: MEDICARE

## 2024-07-14 ENCOUNTER — PATIENT MESSAGE (OUTPATIENT)
Dept: NEUROLOGY | Facility: CLINIC | Age: 79
End: 2024-07-14
Payer: MEDICARE

## 2024-07-15 RX ORDER — CLONAZEPAM 0.5 MG/1
0.5 TABLET ORAL 2 TIMES DAILY PRN
Qty: 60 TABLET | Refills: 0 | Status: SHIPPED | OUTPATIENT
Start: 2024-07-15 | End: 2025-07-15

## 2024-07-25 ENCOUNTER — PATIENT OUTREACH (OUTPATIENT)
Dept: NEUROLOGY | Facility: CLINIC | Age: 79
End: 2024-07-25
Payer: MEDICARE

## 2024-07-25 NOTE — PROGRESS NOTES
Dementia Care Management    Recruitment Call      Date of Service: 2024  Care Navigator completing this form: Lily Dunn  Referred by: Former Care Ecosystem Participant   PCP: BRENDEN Bailey Jr., MD    Patient: Christine Garcia  MRN: [id number]  : 1945  Age: 78 y.o.  Gender: female  Race: White  Ethnicity: Not  or /a    Objective:   Patient and caregiver potentially eligible for [facility] dementia care management programs.    CTN/SW completed outreach attempt on 2024 to assess patient/caregiver interest in the program and screen for eligibility.       Outreach Outcome:   Unable to reach dyad - Left detailed voicemail message with call back number. CTN/SW will make another attempt on 2024

## 2024-07-29 ENCOUNTER — PATIENT OUTREACH (OUTPATIENT)
Dept: NEUROLOGY | Facility: CLINIC | Age: 79
End: 2024-07-29
Payer: MEDICARE

## 2024-07-29 NOTE — PROGRESS NOTES
Dementia Care Management    Recruitment Call      Date of Service: 2024  Care Navigator completing this form: Lily Dunn  Referred by: Former Care Ecosystem Participant   PCP: BRENDEN Bailey Jr., MD    Patient: Christine Garcia  MRN: [id number]  : 1945  Age: 78 y.o.  Gender: female  Race: White  Ethnicity: Not  or /a    Objective:   Patient and caregiver potentially eligible for [facility] dementia care management programs.    CTN/SW completed outreach attempt on 2024 to assess patient/caregiver interest in the program and screen for eligibility.       Outreach Outcome:   Spoke to caregiver Juli to offer and screen for GUIDE Model. Caregiver is interested but unavailable to complete screener at this time. Caregiver requested CTN send email with program information and she will reply with availability for this week or the next.

## 2024-07-30 ENCOUNTER — PATIENT MESSAGE (OUTPATIENT)
Dept: NEUROLOGY | Facility: CLINIC | Age: 79
End: 2024-07-30

## 2024-07-30 ENCOUNTER — OFFICE VISIT (OUTPATIENT)
Dept: NEUROLOGY | Facility: CLINIC | Age: 79
End: 2024-07-30
Payer: MEDICARE

## 2024-07-30 DIAGNOSIS — G31.09 OTHER MODERATE FRONTOTEMPORAL DEMENTIA WITH OTHER BEHAVIORAL DISTURBANCE: Primary | ICD-10-CM

## 2024-07-30 DIAGNOSIS — G20.C PARKINSONISM, UNSPECIFIED PARKINSONISM TYPE: ICD-10-CM

## 2024-07-30 DIAGNOSIS — R53.83 FATIGUE, UNSPECIFIED TYPE: ICD-10-CM

## 2024-07-30 DIAGNOSIS — F02.B18 OTHER MODERATE FRONTOTEMPORAL DEMENTIA WITH OTHER BEHAVIORAL DISTURBANCE: Primary | ICD-10-CM

## 2024-07-30 PROCEDURE — 96116 NUBHVL XM PHYS/QHP 1ST HR: CPT | Mod: 95,59,, | Performed by: PSYCHIATRY & NEUROLOGY

## 2024-07-30 PROCEDURE — 99215 OFFICE O/P EST HI 40 MIN: CPT | Mod: 95,,, | Performed by: PSYCHIATRY & NEUROLOGY

## 2024-07-30 RX ORDER — MODAFINIL 100 MG/1
100 TABLET ORAL EVERY MORNING
Qty: 30 TABLET | Refills: 1 | Status: SHIPPED | OUTPATIENT
Start: 2024-07-30

## 2024-07-30 RX ORDER — DONEPEZIL HYDROCHLORIDE 10 MG/1
10 TABLET, FILM COATED ORAL EVERY MORNING
Qty: 90 TABLET | Refills: 3 | Status: SHIPPED | OUTPATIENT
Start: 2024-07-30 | End: 2025-07-30

## 2024-07-30 NOTE — PROGRESS NOTES
[facility]     PATIENT: Christine Garcia  VISIT DATE: 2024  MRN: [id number]  PRIMARY PROVIDER: BRENDEN Bailey Jr., MD  : 1945       Chief complaint: Progressive Cognitive Impairment     History of present illness:      The patient is a 78-year-old right-handed female who presents today to the [facility] due to concerns related to Progressive Cognitive Impairment.  The patient is accompanied by the daughter who participates in providing history.  Additional information is obtained by reviewing available medical records.     Relevant Background/Context  Known Relevant Family history:  Father lived into his early 90s without any significant medical problems  Mother lived into her late 80s with some mild dementia towards the last 2-3 years of her life however the but otherwise non bothersome.  All 3 children have ADHD like symptoms  Grandaunt possibly suggestive for bipolar disorder  Brother has childhood epilepsy  Neurocognitive Disorder:  Mother lived into her late 80s with some mild dementia towards the last 2-3 years of her life however the but otherwise non bothersome.  Movement Disorder:  The family denies a history of movement disorder (PD, PDD, tremor, etc).  Motorneuron Disorder:  The family denies a history of motor neuron disease (ALS).  Developmental Disorder:  All 3 children have ADHD like symptoms  Psychiatric Disorder:  Grandaunt possibly suggestive for bipolar disorder  Known Relevant Genetics:  Negative for [employer] testing of 33 genes selected ALS2 ANG ANXA11 DAMARIS CHCHD10 CHMP2B DCTN1 ERBB4 FUS GRN HEXA [id number] ITM2B KIF5A MAPT OPTN PFN1 PRNP PSEN1 PSEN2 SETX SNCA SOD1 SORL1 SPG11 SQSTM1 TARDBP TBK1 TFG TREM2 UBQLN2 VAPB VCP  Developmental Milestones:  The patient/family report no known birth complications or early life problems. The patient met all developmental  milestones.  Education/Learning Capacity:  The patient/family report no signs or symptoms suggestive of developmental learning disorder.  HS.  Estimated Educational Experience: 12 years of formal education.  Social History:  The patient lives with her  and daughter and has regular caretakers/sitters.  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 She continues to enjoy [employer].  Retired/Quit: 0  Relevant Medical History:  Hypertension  Major depressive disorder Since 2017 - previous medications include Prozac, citalopram  Hyperlipidemia  Migraine headaches  Patent foramen ovale  Obesity  obstructive sleep apnea on CPAP     Neurocognitive Disorder Features  Onset/Duration:  Sep 2014 (~9-year)  First Symptom:  Memory impairment  Progression:  Gradually Progressive  Clinical Course:  Informant (Approximately Remote History)  On presentation today the patient's her family are the primary historians. The patient's her family report that the patient has had a lifetime history of cyclothymic/ bipolar tendencies without a formal diagnosis. The patient reports that she has had intermittent depression throughout her life but otherwise non bothersome and was never treated. The patient's her family reports that she has had episodes concerning for hypomania such as abnormally upbeat, increased activity/energy, self-confidence, talkativeness, and impaired thought process. 1 example provided includes for tendency to by excessive amounts of clothing that was financially problematic. The symptoms were otherwise non bothersome and did not significantly interfere with her family dynamics. The family reports that this baseline cycling euphoria and disc by me a began to  increase in frequency and severity over the previous 4 years.  EMR (Approximately 2016 to 2017)  her family reports that the patient was becoming mildly more forgetful and inattentive however her family thought this was within normal limits and part of normal aging.  EMR (Approximately 10/2019)  Patient's her  has acute chest ischemic stroke. The patient subsequently comes the primary her caregiver with a significant increase in household / daily stress. The patient began to show signs of worsening of baseline depression and began to lose weight having lost her appetite. her daughter has taken over managing the finances since his father stroke in October. Subacute worsening of memory and attention. She is resentful to her  who had a stroke in October 2019. her daughter reports she was uncharacteristically mean to husbands - appeared to show little empathy or sympathy.  EMR (Approximately 11/2020)  Started on Methylphenidate. her daughter reports 1st noticing hallucinations in November 2020 where she saw kids playing in her court your outside her for will living room at 6:00 p. M. At night. The association between Methylphenidate and hallucinations is unclear.  EMR (Approximately 04/2021)  The daughter reports new onset tremors in April 2021 described as right hand shaking on her lap.  EMR (Between 06/2020-09/2021)  To a lack of documentation in the chart it is unclear in what order the following medications were started and stopped. Venlafaxine,. Citalopram,. Fluoxetine,. Luvox,. Prozac,. Buproprion,. Seroquel,. Ritalin.  EMR (Approximately 2017)  Following the death of patient's mother there were significant psychosocial stressors with subjective worsening of mood and cognition. her family reports that patient's depressive symptoms were neurovegetative in nature with decreased energy, weight loss and apathy.  EMR (08/03/2017)  Due to weight gain she was started on lorcaserin.  EMR (Between  [id number])  Background worsening memory often repeating questions and stories and increased cycling of mood.  EMR (03/02/2018)  Due to signs and symptoms consistent with depression the patient was started on buPROPion.  EMR (05/02/2018)  New SXS suggestive of vertigo. Discontinue lorcaserin. The patient was found to have BPPV left and Epley maneuver was performed.  EMR (03/31/2020)  The patient is seen by Neurology. Her daughter took over managing finances and reminds her to take her medications. Performance on a brief cognitive screening measure was within normal limits (modified Brijesh Cognitive Assessment [MoCA] = 20/22). She had difficulty with auditory attention and delayed recall (2/5 words, 3/3 words with cueing). Review of MRI brain from 05/2018 showed no evidence of abnormalities per neurologist. The patient was subsequently referred to neuropsychology for further evaluation and management.  EMR (04/06/2020)  She has difficulty remembering to take her medication but also has had times when she did not want to take it. Her daughter gives reminders and manages her father's medication. Ms. the patient has help with household tasks after her 's stroke. Her daughter said she was not keeping her home as neatly prior to October 2019. Her daughter helps with laundry and grocery shopping. her family took over after October 2019, but her daughter said they had concern for mistakes prior to that time.  EMR (06/17/2020)  The patient underwent neuropsychological testing. Ms. Garcia's premorbid intellectual abilities were estimated to be in the average range based on word reading and demographic information. She demonstrated intact attention, working memory, and processing speed. Executive functioning was notable for intact set-shifting but difficulty with organization on a drawing task and reduced letter fluency. Language was intact, as was visuospatial functioning (with the exception of a complex figure).  Learning was intact, but recall of learned information was below expectation compared to premorbid estimates. Recognition was intact for information given with and without context. Ms. the patient endorsed mild depression and minimal anxiety on self-report measures. She was diagnosed with mild dementia.  EMR (06/26/2020)  The patient presents to Neurology to report of concerns of memory decline. Her daughter reports that she is often repeating conversations throughout the day. Her friends become more worried. She has been having decline household chores. The patient started on Aricept 10 mg nightly and was decreased down to 5 mg.  EMR (12/02/2020)  MOCA 24/30. The patient presents on Aricept 5 mg at bedtime and Namenda 5 mg b. I. D. her family reports that she has become more obsessive with frequent picking of her face causing sores. She is currently on escitalopram after stopping Prozac. Neurologist recommended stopping methylphenidate and acquiring EKG while on Celexa.  EMR (01/27/2021)  Syncopal event resulting in ER admission. The patient was found unresponsive lying in bed with slurred speech. Orthostatic blood pressure in the ED. Lying BP: 146/67. Sitting BP: 148/71. Standing BP: 105/77.  EMR (01/28/2021)  To reports hallucinations however this is not elaborated upon in the note. The patient was referred order to restart taking methylphenidate on 01/27/2021 however this was not done yet.  EMR (03/17/2021)  EEG showed no evidence of QT prolongation. Tilt table showed hypertension. She was started on midodrine without subsequent syncopal episodes. The patient has stopped stimulants since 12/2020 which include Concerta and Ritalin. The patient continues to take Aricept. her family reports began behavior has worsened. She is often picking both sides of her face hands neck chest and back. She stopped Namenda. Her primary psychiatrist this was after to sertraline. Two reports she started having hallucinations during  the day and night. Examples of hallucinations include that she saw her dog , saw snakes on the ground.  EMR (2021)  Neuropsychology testing is now consistent with Lewy body dementia. Referred to movement Disorder for abnormal picking behavior.  EMR (2021)  GODFREY Scan on Friday reviewed in consistent with his dopamine dysregulation. Hallucinations. her daughter reports 1st noticing hallucinations in 2020 where she saw kids playing in her court your outside her for will living room at 6:00 p. M. At night. Since initial hallucination the patient has seen her dead dog who  greater than 20 years ago often in the evening, sometimes outside her window in the tree them. The patient is often found talking to hallucinations of her family members in the daytime and night. She often sees snakes on the ground in her bathroom. She often daydreams about an imaginary friend: Adeel.  [facility] - Phill Villanueva MD. Neurologist (2021)  On presentation, the patient's her daughter is the primary historian. Patient's her daughter reports a complicated history of cognition/memory impairment and behavioral issues that have been confounded by polypharmacy and medication side effect. The patient has been diagnosed with multiple syndromes and has received extensive workup with inconclusive results. Ms. Garcia's clinical presentation is behavior/psychiatric predominant major cognitive impairment (mild dementia) sufficient to impair activities of daily living (CDR-SOB: 4. 5 , Cesar-Sky iADL: 8/8 - Mild Dementia). Ms. Garcia's clinical syndrome is unclear and has features of multiple syndromes including bvFTD and DLB. Frontotemporal dementia (FTD) and dementia with Lewy bodies (DLB) are distinct neurodegenerative syndromes. It is well known that patients with pathologically confirmed FTD can later develop Parkinsonism and patients with DLB typically have executive dysfunction often in  association with visuospatial dysfunction. Less commonly, some patients may have mixed features of FTD and DLB. However, it remains unknown whether patients can have presenting features that simultaneously meet consensus criteria for FTD and DLB. 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 The pathology underlying Ms. Garcia's cognitive impairment is unclear. Possibilities include a mixed Lewy body disease/alpha-synucleinopathy and and dorsal prefrontal ADRP. Frontotemporal Dementia with Parkinsonism-17/Three-Repeat Tauopathy remains a possibility however the lack of parkinsonism on examination. 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 As such this is speculation at this time. On presentation today, the primary complaints voiced by the patient and her family are polypharmacy and medication side effects. We discussed at length patient's recent imaging clinical history and medication history. We discussed the likely side effects patient's prior exposure to Concerta, Ritalin, venlafaxine, Seroquel, Zyprexa, and various other psychotropic/ neuroleptics may have had in playing a role in her recent tics, tardive dyskinesia, and picking disorders. We discussed the value of limiting dopamine and norepinephrine blocking and modifying agents in the setting of a known dopaminergic deficit as evident by GODFREY scan and  clinical history. We discussed us the need for to consolidate care with multidisciplinary team. The patient and her family have voiced desire to set up with a primary geriatrician at [facility]. We will begin by consolidating care with dedicated geriatrician. We will discuss future plans with primary psychiatrist and limiting neuroleptics/psychotropic medications. In addition we will begin decreasing venlafaxine delayed stimulating qualities in the setting of increasing agitation and cycling behavior. The patient is currently on 75 mg will be decreased down to 37. 5 before changing any additional medications. The patient has recently been started on Zyprexa by primary psychiatrist due to increasing aggression directed towards hallucinations. We will discuss this treatment option with primary psychiatry and strongly recommend limiting D2 blocking agents in the setting of known history of neuroleptic sensitivity as well as ongoing symptoms of tardive dyskinesia. Following removing all potential dopamine and norepinephrine modulating agents as possible we will consider potentially trying mood stabilizing agents with different mechanisms.  Neurologist (10/28/2021)  Since last time seen venlafaxine has been stopped. Zyprexa has been continued at low dose Without any noticeable side effects. However upon further review since starting Zyprexa the patient has gained 5-10 lb. With worsening of baseline obstructive sleep apnea. The patient has attempted gabapentin (300-600mg) however was too dizzy to tolerate the patient. The patient has not yet set up with a geriatrician. On presentation today we discuss additional effort to maximize medical therapy. The patient is taking donepezil and memantine without notable side effects. The patient is distally midodrine for symptomatic hypotension. The patient is taking multiple over-the-counter supplements which include vitamin-D and C for which we have no concern. The patient  discusses desire to be on less medications so we have time decrease the frequency of her over-the-counter vitamin supplementation. On presentation today the patient appears significantly better compared to last time seen. Tardive dyskinesia has significantly improved. There is no visible tremor on examination nor are there any noticeable picking movements. Patient's her daughter indicates the patient continues to pick at home however it has decreased. Furthermore patient's agitation and irritability with her daughter have significantly improved. Ongoing issues today include worsening of baseline insomnia with advanced sleep phase disorder and worsening of baseline obstructive sleep apnea with persistent daytime fatigue. At the base of these issues seem to be a lack of meaningful activities during the daytime. The patient has engaged in [employer] for many years and has been vigorously involved in this as early as 5 years ago however this has decreased considerably in recent years. We discussed potential opportunities for social engagement including needlepoint groups. We discussed need for cardiovascular exercise. The patient was a for lytics where we have discussed the benefits of aqua therapy. We discussed risks versus benefits of ongoing medical therapy for insomnia. We agreed to start with melatonin and or ramelteon as well as engaging in new sleep behaviors. We strongly recommend against using any stimulants at this time specifically anything that modulates dopamine or nerve in Afrin. The patient is taking 1 cup of coffee per day typically in the morning. The patient has stopped Seroquel, Namenda, Effexor, and Allegra. The patient started gabapentin last week about after today's was unable to walk straight. Is on Zyprexa 2. 5 mg. Tardive dyskinesia has improved. Hallucinations have improved. Patient's affect and mood have improved significantly. She is now happy, sweet, and kind her daughter. Aggressive  outbursts have decreased. Presentation favoring BV of TD. Poor insight. Continues of compulsive behaviors or personality changes. No clear parkinsonism on examination. Continue donepezil 10 mg daily.  [facility] - Phill Villanueva MD. Neurologist (12/07/2021)  Type: Chart Review. Since last time seen, the patient reports difficulty acquiring ramelteon. Despite multiple attempts by the office the patient is would be expected the patient over 300 dollars out of pocket for medication. As such the patient not started medication. The patient continues to report bothersome insomnia with refractory advanced phase sleep disorder. We recommended restarting melatonin 5 mg with gabapentin 100 mg. If the patient is unresponsive will try Belsomra 10 mg. Other issues addressed today include new urinary incontinence. her family reports finding the patient has been wetting her clothing in recent weeks. On 1 occasion the patient reports finding blood in her urine however it is uncertain whether not there is a language barrier due to degenerative disorder. her family is at the C objective evidence blood in urine. Per the report of the family signs and symptoms are suggestive of urge incontinence however her family will investigate this further. Recommend starting out with a urine analysis to confirm no evidence of urinary infection. Following this if urine UA is negative we will start myrbetriq. Additionally we discussed the opportunity for genetic counseling today. The patient and her daughter are interested however like to think about it. 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 Given evidence of FTLD with parkinsonism there is the possibility for MAPT mutation. Ms. Garcia's clinical presentation is behavior/psychiatric predominant major cognitive impairment (mild dementia) sufficient to impair activities of daily  living (CDR-SOB: 4. 5 , [place]-Sky iADL: 8/8 - Mild Dementia). Frontotemporal dementia (FTD) and dementia with Lewy bodies (DLB) are distinct neurodegenerative syndromes. It is well known that patients with pathologically confirmed FTD can later develop Parkinsonism and patients with DLB typically have executive dysfunction often in association with visuospatial dysfunction. Less commonly, some patients may have mixed features of FTD and DLB. 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 The pathology underlying Ms. Garcia's cognitive impairment is unclear. Possibilities include a mixed Lewy body disease/alpha-synucleinopathy and and dorsal prefrontal ADRP.   Frontotemporal Dementia with Parkinsonism-17/Three-Repeat Tauopathy remains a possibility however the lack of parkinsonism on examination. 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 As such this is speculation at this time. Since last time seen the patient continues to report improvement in movement and tardive dyskinesia.   the patient continues to be irritable with advanced sleep phase disorder and mild atypical movements however the significantly improved.   the patient is longer no longer gaining weight after discontinuation of Zyprexa.   Patient's agitation is under control. We discussed us the need for to consolidate care with multidisciplinary team.   the patient has not yet set up with a dedicated geriatrician. Primary  concerns and ongoing issues today seem to stem from poor sleep behavior.   Patient   Was not able to start ramelteon 8mg  due to cost.   Recommend starting melatonin 5 mg with gabapentin 100 mg.   We will start the approval process for Belsomra. Given new urinary incontinence have requested urinalysis with reflex culture.   Following negative results will start mybetriq. her family has expressed interest in genetic testing for FTLD.    Following confirmation with her family will order [facility] Hereditary Amyotrophic Lateral Sclerosis, Frontotemporal Dementia and Alzheimer Disease Panel.  [facility] - Phill Villanueva MD. Neurologist (12/21/2021)  Type: Chart Review. Since last time seen her family reporting mild improvement in sleep in general behavior. The patient was recently diagnosed with the UTI and treated for 10 days with Bactrim DS. Patient's last day is today. During the same timeframe her family reports a slight increase in patient's oral facial dyskinesias. During last discussion her family endorsed new ileum limb phenomena as well as talking to herself which seems to be at its baseline. her family continues to report concerns of cycling behavior we frustration and irritability. Sleep is largely non bothersome however does tend to have significant daytime fatigue with variable sleep latency. The patient has not started Belsomra. The patient is taking gabapentin 100 mg at night. We have discussed in prior counters of opportunities for treatment for cycling behavior. Given patient's sensitivity medication we have been hesitant. We discussed potential antiepileptics which include gabapentin Lamictal. Recommend trial of Lamictal 25 mg for cycling behavior. The patient is reticent however interested. We discussed medication monitoring. In addition the patient presents today to discuss potential genetic testing. We discussed patient's diagnosis of frontal temporal lobar degeneration and the potential  genetic etiologies. her family is interested. The patient is interested. We will pursue. Counseling has been provided at bedside.  the observations made above were discussed with the patient and her family.   We discussed the opportunities for optimal medication management.   the patient is tolerating current medication regimen without side effect.   Is uncertain the patient is getting deep sleep.   the patient has not started Belsomra.   Recommend switching gabapentin 100 for Belsomra 5 mg for trial for sleep-related issues.   In addition the patient continues to have mild cyclothymic tendencies with mood swings bothersome to the patient and her family.   We discussed medication options in the past.   Recommended trial of low-dose Lamictal 25 mg. Side effects have been discussed with her family.   her family is advised to watch for rashes.   Will repeat LFTs and CBC at 1 and 3 months. Additional things discussed today include genetic testing.   her family would like to pursue genetic testing at this time.   We will screen for standard causes of FTLD with [employer] neurodegenerative panel.  Primary Care Provider (02/08/2022)  Type: Chart Review. S the patient is a 76 y. O. Female with history of atrial septal aneurysm, thoracic atherosclerosis, hypercholesterolemia, depression, dementia, relative bradycardia, vasodepressive type of vasovagal.  Specialist: cardiology (03/22/2022)  Type: Chart Review. S the patient is a 76 y. O. Female with history of atrial septal aneurysm, thoracic atherosclerosis, hypercholesterolemia, depression, dementia, relative bradycardia, vasodepressive type of vasovagal.  Hospitalization (06/25/2022)  Type: Chart Review. Last time seen, the patient was started on low-dose Lamictal. Gabapentin was continued 100 mg. Belsomra does not appear to have been started. Donepezil was continued 10 mg q. Day. Since starting Lamictal her family report improvement in cycling behavior. Though there has been  a recent up taking last 1-2 weeks of anxiety her daughter reports general improvement in her cyclothymic tendencies with variable highs of panic/ anxiety and confusion. Recommend increasing 25 mg once a day to twice a day following liver function tests which will be completed next 2-3 days. Other issues addressed today include longstanding insomnia. The patient is responsive to 4 mg melatonin extended release. her family does report however in recent weeks the patient has been exhibiting more REM sleep behavioral disorder. The patient has a long history of bruxism for which she is in the process of getting a mouth guard. However recently fit the patient has been noted to be talking her sleep more often. her family report minimum 1 episode in recent past where she woke up in a frantic state. her  thinks she was still sleep however was actively in distress and thought she saw things on the ground. Is not entirely Seroquel she is certain whether not these were hallucinations or REM sleep behavior disorder however her  who was present thinks she was still asleep. Review of previous medical records indicate the patient has been recently seen by Cardiology. The patient has a prior diagnosis of orthostatic hypotension. The patient is currently treated on midodrine. The patient there are no current side effects on midodrine. Otherwise the patient has improved compared to last time seen. Cycling behavior has not fully improved but however is going in the right direction. Sleep seems to be under control aside from bothersome REM sleep behavioral disorder. We discussed potential medications for controlling REM sleep behavior disorder which include clonazepam however given prior medication sensitivities recommend increasing Lamictal 1st before considering the addition of benzodiazepines. As patient's sleep is currently under control do not recommend switching the patient from melatonin to Belsomra at this time.  Ramelteon was not covered by insurance. At the observations and made above were discussed with the patient and her family. 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 The patient's clinical syndrome was initially masked by polypharmacy as we recommend monitoring for symptoms mentioned above. Now that we have tapered off previous medications, her clinical presentation is becoming more clear. Patient's presentation is becoming to look more akin to Lewy body dementia   Given neuroleptic sensitivity, REM sleep behavioral disorder, personal familial history of mood disorder, significant fluctuations in arousal/mood  however at this time the patient does not have overt parkinsonism nor vivid well-formed hallucinations. Recommend increasing Lamictal from 25 mg to 25 mg b. I. D. Following LFTs. Continue melatonin 4 mg. If needed will attempt Belsomra. Recommend avoiding anticholinergics and antihistamines. Repeat LFT/ CMP for increasing Lamictal. The patient continues to have bothersome worsening RBD and bruxism will consider the addition of clonazepam. Type: Chart Review. 76-year-old female with history of hypertension, hyperlipidemia, frontotemporal dementia presents to the emergency department for reported syncopal episode. her  reports he could see the patient standing at the bathroom sink then when he looked away he heard the patient fall, but did not witness it. Reports that she fell backwards and was unresponsive momentarily after the fall. No seizure-like activity that was witnessed. The patient sustained a wound to the back of her head, lower lip into her chin and broke her left upper veneer. The patient reports no significant pain at this time. She is not anticoagulated. Last tetanus vaccine 2014.  [facility] - Phill Villanueva MD. Neurologist (08/09/2022)  Type: Chart Review. 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 On 06/25/2022 the patient had a syncopal episode likely secondary to orthostatic hypotension while standing at the bathroom sink. The patient fell backwards hitting her face break your teeth on the sink becoming unresponsive for a moment. CT head showed no acute intracranial trauma. Regardless following this event her family believes there has been a decompensation. As primary historian, the patient's daughter, has been away on vacation for the last month is unclear when patient's Behavior in sleep began to decompensate. Since last time seen patient's sleep has become more fragmented. She is frequently having REM sleep behavior disorder but frequently talking acting out dreams. She reports increasing daytime fatigue, worsening comprehension/confusion, worsening excoriation disorder, in increasing mood swings. We discussed medication alternatives for various issues. Recommend focusing on sleep by increasing melatonin to Belsomra. Recommend low risk strategies such as increasing and acetylcysteine for excoriation disorder. Given patient's worsening lightheadedness responsive to midodrine recommend increasing from 5 mg to 10 mg b. I. D. Over 3 weeks. We discussed patient's clinical presentation B-mode were consistent with Lewy body dementia at this time however frontal temporal atrophy still remains atypical for syndrome. We have discussed biomarker testing in the form of the Syn-One skin biopsy for alpha-syn. her family is interested. We we will provide reading material. At the observations and made above were discussed with the patient and her family. Following a recent ground level fall with head trauma patient's behavior and sleep have worsened. Previously the patient has mood lability and outbursts were modestly managed on Lamictal 25 mg BID. Currently the patient is behavioral outbursts have worsened. Patient's recent ground level fall seems to been provoked by orthostatic  hypotension for which the patient is taking midodrine 5 mg t. I. D. Recommend increase to 10 mg t. I. D. Slowly over 3 weeks. Regarding patient's insomnia recommend switching from melatonin to Belsomra 10 mg. For patient's worsening excoriation disorder, recommend starting NAC 1200mg and titrate slowly to 3000mg daily. The next appointment will consider increasing Lamictal for excoriations disorder and mood disorder. 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 We provided reading material.  [facility] - Phill Villanueva MD. Neurologist (08/23/2022)  Type: Chart Review. Last time seen patient's midodrine was increased to 10 mg b. I. D. Over 3 weeks. Offered Syn-One skin biopsy for alpha-syn and expanded genetic testing panel. Started and acetylcysteine and titrated up to 3000 mg. Started Belsomra 10 mg. During this appt discussed potentially increasing Lamictal. At the observations and made above were discussed with the patient and her family. Following a recent ground level fall with head trauma patient's behavior and sleep have worsened. Previously the patient has mood lability and outbursts were modestly managed on Lamictal 25 mg BID. Currently the patient is behavioral outbursts have worsened. Patient's recent ground level fall seems to been provoked by orthostatic hypotension for which the patient is taking midodrine 5 mg t. I. D. Recommend increase to 10 mg t. I. D. Slowly over 3 weeks. Regarding patient's insomnia recommend switching from melatonin to Belsomra 10 mg. For patient's worsening excoriation disorder, recommend starting NAC 1200mg and titrate slowly to 3000mg daily. The next appointment will consider increasing Lamictal for excoriations disorder and mood disorder. 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 We provided reading material. Suvorexant (as well as other orexin inhibitors) is extremely effective, well-tolerated in those over 65 with minimal adverse reactions in clinical trials, and non-addictive. In clinical trials it was shown to improve total sleep time, sleep latency, waking after sleep onset, and quality of sleep, and the only adverse reaction that clearly  from placebo was morning grogginess (Suvorexant 7%, placebo 3%). If insurance requires a prior authorization, guidelines established by the American Academy of Sleep Medicine (AASM) should be sufficient justification for using Suvorexant. Per the AASM guidelines (Angella et al, J Clin Sleep Med, 2017;13(2):307-349), Suvorexant is the most reasonable choice for the treatment of the insomnia in those over 65, particularly in the setting of cognitive impairment. Benzodiazepines (e. G. Triazolam, Temazepam), Z drugs (e. G. Zolpidem, Eszopiclone), and anticholinergic medications (e. G. Doxepin) are not recommended to treat insomnia in those over 65 and are listed on the AGS Beers Criteria (AGS Beers Criteria Update Expert Panel, J Am Geriatr Soc, 2019;67(4):674-694). These medications increase the risk for falls and worsen cognition. Other medications, including Zaleplon and Ramelteon are only indicated for sleep-onset insomnia. Trazodone is not recommended by AASM for the treatment of insomnia.  [facility] - Phill Villanueva MD. Neurologist (08/30/2022)  Type: Chart Review. Last time seen patient's midodrine was increased to 10 mg b. I. D. Over 3 weeks. Offered Syn-One skin biopsy for alpha-syn and expanded genetic testing panel. Started and acetylcysteine and titrated up to 1000 mg. Started Belsomra 10 mg. During this appt discussed potentially increasing Lamictal. Since starting Belsomra her family reported improvement in sleep however continues to have mild fragmentation of sleep  that is somewhat bothersome. The patient has REM sleep behavior disorder seems to have improved with deeper sleep on Belsomra 10 mg. Recommend increasing to 20 mg per of her family his only titrated up and acetylcysteine to 1000 mg. Recommend increasing to 3000 mg over the next month before stopping medication entirely. In the last 3 weeks her family report return of patient's oral pharyngeal dyskinetic movements. There appear mild on presentation however are still present. The patient continues to have bother some mood fluctuations that are otherwise relatively stable. Given patient's increasing dyskinetic movements, skin picking, and mood swings recommend increasing Lamictal 25 mg b. I. D. To 50 mg b. I. D. Slowly over 1 month. If symptoms do not improve on increased dose of Lamictal may consider the addition of clonazepam for bruxism and REM sleep behavior disorder. At the observations and made above were discussed with the patient and her family. The patient has responded to Belsomra 10 mg however incompletely. Recommend increasing to 20 mg and maintaining CPAP compliance. The patient continues to have bothersome excoriation, picking, dyskinetic or pharyngeal movements, and mood disorder. Recommend increasing Lamictal to 50 mg b. I. D. May consider the addition of clonazepam at later date. 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 Will follow-up following titration of N-acetylcysteine as well as retrieval of results for extended [employer] genetic testing.  Emergency Room (01/27/2023)  Type: Chart Review. Christine Garcia is a 77 y. O. Female who presents to ED with left arm pain. The patient woke up from a nap prior to arrival. She complained of left arm pain. She reports no falls or injuries. Denies lying in her arm. Her  saw the patient, noted that she did not appear well, EMS was then called. her  states that the patient was getting out of bed, was holding herself  up against the post of the bed. The patient reports no arm pain at this time. She denies any chest pain. Denies any numbness or tingling to the arm. her family states she does have a history of orthostatic hypotension.  Electronic Medical Record (03/06/2023)  Type: Chart Review. I'm following up on a couple phone messages I've left regarding scheduling an appointment with you for my Mom. She has a visit to the hospital a few weeks ago and since seeing her primary, they recommended having a follow up with you as well. She's had labs done and having a cervical MRI and X-ray done today.  [facility] - Phill Villanueva MD. Neurologist (03/28/2023)  Type: Chart Review. Since last time seen the patient was briefly seen in the hospital which showed a cervical MRI and x-ray. Imaging was unrevealing. Since last time seen patient's symptoms have been relatively stable. She is continued to have episodes of vasovagal syncope evaluated by Cardiology. Ultrasounds have been stable. Since patient's ground level fall the patient has been reporting intermittent left arm disturbances. Initially thought to be weakness evaluated for TIA however symptoms actually were painful in nature. MRI C-spine reviewed show evidence of C3/C4 radiculopathy which would explain patient's sensory disturbance in the left arm. her family reports the patient has been reporting intermittent left arm pain and headaches likely related to cervical stenosis and radiculopathy however is no evidence of cord impingement to explain lower extremity symptoms. We discussed patient's clinical symptoms as related to her change in behavior over the last month. Last month the patient has been having increasing spells of confusion superimposed with what appears to be increase in anxiety related to unclear pain. The patient has a hard time localizing her understanding for pain at this time requiring additional care support. We recommend addressing pain as this  likely is the source of her anxiety for which he is unable to localize symptoms. Recommend starting PT with massage therapy of neck. We discussed traction exercises. Recommend starting scheduled Tylenol p. R. N. Ibuprofen. May increase with gabapentin p. R. N. For radiculopathy. 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 Recommend avoiding as much as possible tizanidine Flexeril as it will likely make confusion worse. The family also reported evolution of bruxism. The patient is no longer granular teeth however she is biting down. We discussed mouth guards and other therapies to this effect. The patient is tolerating Lamictal well 50 mg twice a day and N-acetylcysteine 3 tablets daily. If necessary may increase dose. The patient also reports intermittent lightheadedness. The patient has been responsive to midodrine 5 mg periods unclear if this over treatment as in hospital patient's systolic was chronically elevated however at home and recent doctor's appointments has been well within normal limits. At the observations and made above were discussed with the patient and her family.     Current Presentation  Recent/Interim History:  Since the last visit, the patient's condition has continued to advance, and they have been transitioned to palliative care. The patient has been in regular communication with our neurocognitive team for medication adjustments. They have been experiencing difficulty swallowing, coughing, gagging, gasping for air, and increased drooling, issues present since at least June. The patient was last evaluated by the care team in April and prescribed clonazepam 0. 5 mg twice daily to manage myoclonic activity. Since starting clonazepam, the patient's her daughter has observed increased drowsiness. The patient takes her bedtime medications between 1830 and 1900 hours and  wakes up between 0400 and 0500 hours. The daughter, who is not the primary caregiver, cannot confirm if there has been a reduction in myoclonic activity. They have been engaging in more activities together, such as playing catch. The patient continues to experience urinary incontinence, though it has slightly improved as she is no longer soaking the sheets as before. Despite this, the daughter does not believe oxybutynin is effective following an inability to tolerate Myrbetriq. Today, it appears that the patient has declined over the last 6-12 months in the setting of increasing polypharmacy. It is unclear if the patient still needs midodrine, as she has been hypertensive for at least the last 2-4 weeks, alongside increasing confusion. To recap, the patient was started on modafinil in mid to late 2023, with increasing doses up to 200 mg later in the year. The patient subsequently developed worsening urinary incontinence, which appears to be more related to urinary retention than frequency. She recently started Myrbetriq after switching off oxybutynin and has reported increasing confusion since this change. Clonazepam was initiated for symptoms concerning myoclonus, but it has resulted in increased daytime sedation. The nocturnal dose seems somewhat effective in controlling some of the patient's other issues; however, bothersome constipation has become a concern, now managed with scheduled milk of magnesium. We reviewed the patient's medications and expressed concerns regarding polypharmacy, noting the potential need to discontinue multiple medications. We recommend tapering down Provigil from 200 mg to 100 mg and stopping oxybutynin. We also recommend increasing Linzess and moving donepezil to the morning. Following these adjustments, we will likely further discontinue or adjust clonazepam to 2 q. H. S. Reassessing based on the patient's needs at that time.  Unresolved Concern(s) reported by  patient/family:  Insomnia - improving with Belsomra, complicated by CPAP noncompliance  Anxiety/cycling mood - improved on low dose lamictal     Review of cognitive, visuospatial, motor, sensory, and behavioral systems:     Memory:   The patient's memory has worsened in the past few years.  She does repeat statements or asks the same question repeatedly.  She does have difficulty remembering recent important conversations.  She does have difficulty remembering recent events.  She does forget information within minutes.  Her recent retrograde memory is impaired.  Her remote memory is impaired.  Attention:   The patient's attention and concentration are impaired.  She does have attentional fluctuations.  She does have difficulty with selective attention.  She does become easily distracted.  She does have difficulty with divided attention.  Executive:   The patient's cognitive processing speed is slower.  She does have difficulty with working memory.  She does misplace personal items (e.g., keys, cell phone, wallet) more frequently.  She does have difficulty keeping track of her medications.  She does have difficulty with planning/organizing/completing multistep tasks.  She does have difficulty with executive attention.  She does not have difficulty with flexible thinking.  She does not difficulty with self control.  She is exhibiting new symptoms that suggest they have become more impulsive, rash and/or careless.  Her judgment is impaired.  Language:   The patient's speech is affected.  She does forget people's names more frequently.  She does have word-finding difficulties.  Her speech is non-fluent and effortful.  Her speech is not grammatically intact.  She does make inaccurate word substitutions.  She does have difficulty reading.  She appears to have impaired comprehension.  Visuospatial:   The patient has new visuospatial problems.  She has become confused or disoriented in *new*, unfamiliar places.  She does  have trouble navigating.  She has gotten lost in familiar places.  She does have visuospatial disorientation.  She does have difficulty recognizing objects or faces.  Motor/Coordination:   The patient does have difficulty with walking.  She does feel imbalanced.  She denies having fallen.  She reports new muscle weakness.  She does have difficulty buttoning shirts, operating zippers, or manipulating tools/utensils.  Her handwriting has not become micrographic.  She does not have a resting tremor.  She denies having any new involuntary movements and/or muscle jerking.  She does not have swallowing difficulty.  She denies new muscle cramps and twitching.  Sensory:   The patient denies new numbness, tingling, paresthesias, or pain.  The patient denies a loss of vision, blurry vision, or double vision.  The patient denies new loss of hearing or worsening tinnitus.  The patient does have anosmia.  Sleep:   The patient reports difficulty sleeping. Comment: 4/2020: She sleeps well. 4/2021: She uses CPAP to treat obstructive sleep apnea (since fall 2020), but this is inconsistent. Her  said she has conversations in her sleep or will seem to be awake but still be sleeping. No significant motor movements in sleep.  The patient does have difficulty going to sleep.  The patient reports difficulty staying asleep and/or frequently awakening at night.  The patient does snore and/or have witnessed apneas while sleeping. Comment: She uses CPAP to treat obstructive sleep apnea (since fall 2020), but this is inconsistent.  When she wakes up in the morning, she does not feel well-rested.  She has been reported to have dream-enactment behavior. Comment: Her  said she has conversations in her sleep or will seem to be awake but still be sleeping. No significant motor movements in sleep.  She denies symptoms suggestive of restless leg syndrome.  Behavior:   The patient's personality has changed.  She does have symptoms of  disinhibition and social inappropriateness.  She is exhibiting symptoms to suggest a loss of manners and/or decorum.  She does not have apathy and/or decreased motivation. Comment: 4/2020: Her daughter noted reduced engagement in pleasurable activities. 4/2021: Significant, per daughter. She mostly sits in her chair and stares. Ms. Garcia said she does needlepoint, but her daughter said this is not consistent. She was prescribed Ritalin.  She does appear to have had a change in behavioral/emotional inertia.  The patient's emotional expression has changed.  She does have emotional blunting or lability.  She does have symptoms of irritability and mood lability. Comment: 4/2020: Her daughter noted some irritability due to stress. 4/2021: Irritability with her  in the context of caregiving.  She has been reported to have new symptoms of agitation, aggression, or violent outbursts. Comment: 4/2020: Resistance to taking medication but will accept feedback and support from family without defensiveness. 4/2021: None reported.  Her insight into his disease and situation is impaired.  Her personal hygiene has become impaired.  She is exhibiting a diminished response to other people's needs and feelings. Comment: patient has consistently demonstrated decrease in appetite and response to both her  and others. Patient has been hardly a necessarily mean to her  who has recently had a stroke and has become physically disabled following this.  She is exhibiting diminished social interest, interrelatedness, and/or personal warmth.  She does appear restless.  She denies new and/or worsening simple repetitive behaviors.  Her speech has become simplified and/or repetitive/stereotyped.  She reports symptoms that are suggestive of new/worsening complex repetitive/ritualistic compulsions and behaviors.  She does not have symptoms of hyper-religiosity or dogmatism.  Her interests/pleasures have not become restrictive,  simplified, interrupting, or repetitive.  She is exhibiting symptoms to suggest an increase in self-stimulating behavior. Comment: 2021: Picking behaviors, which progressed beyond her face to her back and chest despite changes in medications. Triple-checking door locks at night.  She has had changes in eating behavior. Comment: Patient is engaged in freeing frequent binge eating often getting 10-20 lb a time losing it however patient's of food patterns have not otherwise become for strict it. Per family she has always had a sweet tooth however the cycling and range of this behavior has increased  She has been exhibited symptoms to suggest increased consumption of food and/or alcohol/cigarettes.  She denies oral exploration or consumption of inedible objects.  Psychiatric:   She does feel depressed. Comment: 2020: Ms. Garcia said she feels sad at times. Her daughter said she has noticed depressed mood and lack of self care. Her daughter indicated friends and medical providers have noticed changes. 2021: Improvement in depressed mood with medication but tearful at times without a clear cause. Her daughter said she is more timid and unsure.  She is exhibiting symptoms of social withdrawal/indifference.  She does have anxiety. Comment: 2020: None reported. 2021: Significant anxiety, at times (e.g., riding in car, around the house, going to an art class), but no anxiety at other times.  She does exhibit cycling behavior.  She does exhibit hyperactive behavior.  She is not exhibited symptoms of paranoia.  She does have delusions.  She does have hallucinations. Comment: 2020: None reported 2021: After her ankle fracture, she saw snakes on the floor of the bedroom at night and during the day. She was not taking pain medication at the time. Around , she saw and communicated with two little children in her courtyard. One evening in December, she saw and talked with their  dog, who she saw in  a tree outside the window.  She does have a history of sensitivity to neuroleptic/psychotropic medications.  Medical Review of Systems:   The patient does have constipation.  The patient does have urinary incontinence. Comment: urinary incontinence/urge as of 11/2021  The patient reports symptoms that are suggestive of orthostatic lightheadedness.  The patient's weight is unstable. Comment: 4/2020: Her daughter reported changes in diet. She eats less when depressed but eats sweets and coke. Her daughter is doing the grocery shopping. 4/2021: Her daughter has noticed some weight loss  Functional status:  Difficulty performing the following Instrumental ADLs:  Household chores: Yes  Food Preparation: Yes  Shopping: Yes  Ability to Handle Finances: Yes  Transportation/Driving: Yes  Household Appliances/Stove: Yes  Laundry: Yes  Difficulty performing the following Basic ADLs:  Dressing: Yes  Bathing: Yes  Toileting: Yes  Personal hygiene and grooming: No  Feeding: No  Care Management:  Patient/Family Safety Concerns:  Medication Adherence: No  Home Safety: No  Wandered: No  Firearms: No  Fall Risk: Yes  Home Alone: No        Past Medical History:   Diagnosis Date    Alzheimer's disease     Anxiety     Depression     Dyslipidemia 10/02/2015    Fall 11/02/2020    with fracture    Hyperlipidemia     Hypertension     minimal    Migraines     Obesity 10/02/2015    Paranoia     Patent foramen ovale 10/02/2015       Past Surgical History:   Procedure Laterality Date    COLONOSCOPY N/A 1/18/2017    Procedure: COLONOSCOPY;  Surgeon: Gerber Naylor MD;  Location: [facility] ENDO;  Service: Endoscopy;  Laterality: N/A;    HYSTERECTOMY      KNEE ARTHROPLASTY Bilateral 06/16/2020    meniscus repair    left elbow      left knee      OOPHORECTOMY      OPEN REDUCTION AND INTERNAL FIXATION (ORIF) OF INJURY OF ANKLE Right 11/3/2020    Procedure: ORIF, ANKLE;  Surgeon: Ted Rome MD;  Location: [facility] OR;  Service: Orthopedics;   Laterality: Right;       Family History   Problem Relation Name Age of Onset    Diabetes Father      Heart disease Father      COPD Father      Lung cancer Brother      Cancer Sister         Social History     Socioeconomic History    Marital status:      Spouse name: josé    Number of children: 3   Tobacco Use    Smoking status: Former     Current packs/day: 0.00     Average packs/day: 1 pack/day for 20.0 years (20.0 ttl pk-yrs)     Types: Cigarettes     Start date:      Quit date:      Years since quittin.6    Smokeless tobacco: Never    Tobacco comments:     1-2ppd   Substance and Sexual Activity    Alcohol use: Yes     Alcohol/week: 1.0 standard drink of alcohol     Types: 1 Shots of liquor per week     Comment: occ    Drug use: Never    Sexual activity: Yes     Partners: Male     Birth control/protection: Post-menopausal, None     Social Determinants of Health     Financial Resource Strain: Low Risk  (2024)    Overall Financial Resource Strain (CARDIA)     Difficulty of Paying Living Expenses: Not hard at all   Food Insecurity: No Food Insecurity (2024)    Hunger Vital Sign     Worried About Running Out of Food in the Last Year: Never true     Ran Out of Food in the Last Year: Never true   Transportation Needs: No Transportation Needs (2024)    OASIS : Transportation     Lack of Transportation (Medical): No     Lack of Transportation (Non-Medical): No     Patient Unable or Declines to Respond: No   Physical Activity: Insufficiently Active (2024)    Exercise Vital Sign     Days of Exercise per Week: 1 day     Minutes of Exercise per Session: 10 min   Stress: Stress Concern Present (2024)    Citizen of [place] [employer] of [employer] - Occupational Stress Questionnaire     Feeling of Stress : Rather much   Housing Stability: Low Risk  (2023)    Housing Stability Vital Sign     Unable to Pay for Housing in the Last Year: No     Number of Places Lived in the Last  "Year: 1     Unstable Housing in the Last Year: No       Medication:     Current Outpatient Medications on File Prior to Visit   Medication Sig Dispense Refill    acetaminophen (TYLENOL) 650 MG TbSR Take 650 mg by mouth 2 (two) times a day. Tylenol Arthritis      acetylcysteine, bulk, Powd TAKE 2 TABLETS (1200MG) DAILY FOR 3 WEEKS, THEN TAKE 4 TABLETS DAILY (Patient taking differently: Patient taked 2000 mg in am and 2000 mg in pm) 120 g 1    aspirin (ECOTRIN) 81 MG EC tablet Take 81 mg by mouth once daily.      clonazePAM (KLONOPIN) 0.5 MG tablet Take 1 tablet (0.5 mg total) by mouth 2 (two) times daily as needed for Anxiety. 60 tablet 0    donepeziL (ARICEPT) 10 MG tablet Take 1 tablet (10 mg total) by mouth every morning. 90 tablet 3    lamoTRIgine (LAMICTAL) 25 MG tablet TAKE 2 TABLETS BY MOUTH 2 TIMES DAILY. 360 tablet 3    linaCLOtide (LINZESS) 72 mcg Cap capsule Take 1 capsule (72 mcg total) by mouth before breakfast. 30 capsule 2    LINZESS 72 mcg Cap capsule TAKE 1 CAPSULE (72 MCG TOTAL) BY MOUTH BEFORE BREAKFAST 30 capsule 0    midodrine (PROAMATINE) 5 MG Tab TAKE 2 TABLETS BY MOUTH 3 TIMES A DAY WITH MEALS. (Patient taking differently: TAKE 1 TABLET BY MOUTH 3 TIMES A DAY WITH MEALS.) 180 tablet 1    modafiniL (PROVIGIL) 200 MG Tab Take 1 tablet (200 mg total) by mouth once daily. 30 tablet 3    oxybutynin (DITROPAN-XL) 10 MG 24 hr tablet TAKE 1 TABLET BY MOUTH EVERY DAY 90 tablet 0    pantoprazole (PROTONIX) 20 MG tablet Take 1 tablet (20 mg total) by mouth once daily. 90 tablet 3    rosuvastatin (CRESTOR) 20 MG tablet TAKE 1 TABLET BY MOUTH EVERY DAY 90 tablet 3    suvorexant (BELSOMRA) 20 mg Tab TAKE 1 TABLET BY MOUTH EVERY DAY IN THE EVENING 90 tablet 1    UNABLE TO FIND Take 2 tablets by mouth Daily. "Shaklee Nutriferon"  Indications: Immune support       No current facility-administered medications on file prior to visit.        Review of patient's allergies indicates:   Allergen Reactions    Sucrose " Other (See Comments)     GI issues -    Myrbetriq [mirabegron] Other (See Comments)     Moderate to severe dizziness        Medications Reconciliation:   I have reconciled the patient's home medications and discharge medications with the patient/family. I have updated all changes.  Refer to After-Visit Medication List.    Objective:  Vital Signs:  There were no vitals filed for this visit.  Wt Readings from Last 3 Encounters:   06/30/24 1303 71.7 kg (158 lb)   04/05/24 1120 72 kg (158 lb 9.9 oz)   11/13/23 1035 70.9 kg (156 lb 4.8 oz)     There is no height or weight on file to calculate BMI.           Neurological examination:  Mental Status:   Her appearance deviates from typical expectations given their age and context. Comment: looks older than stated age;  Throughout the interview, she behaved abnormally and was not cooperative. Comment: refuses complete evaluation;  Her behavior was inappropriate to the clinical context and situation. Comment: child-like;  The patient's energy level is abnormal. Comment: Fluctuating;  Her orientation is not entirely accurate.  Her attention/concentration is impaired.  She is unable to complete three-step commands without making errors.  Her fund of knowledge was less than what would be expected given age, culture and level of education.  Her thought process is not logical or goal-oriented. Comment: circumstantial, bradyphrenia, perseveration, obsessive;  She demonstrated impaired insight based on actions, awareness of her illness, plans for the future.  She demonstrated impaired judgment based on actions and plans for the future.  She has evidence of hallucinations.  She has evidence of delusions.  Cranial Nerves:   Her pupils were normal.  Her elemental visual acuity assessment demonstrated normal OS vision and normal OD vision.  Her visual fields were full to confrontation in all quadrants.  Assessment of extinction with double/simultaneous stimuli was negative in her  "bilateral visual fields.  Her ocular pursuit was impaired. Comment: decrease horizontal tracking;  Her saccades were abnormal. Comment: decreased initiation;  Her eyelid assessment showed no apraxia. There was no eyelid dysfunction, retraction, or somers sign.  Her blink rate was abnormal. Comment: decreased;  Her facial strength was normal.  Her facial expression was symmetric and appropriate to the context.  She does not require the use of hearing aids.  Her tongue showed no evidence of scalloping.  She tongue movement with normal.  She had no significant evidence of anterocollis or retrocollis.  Speech/Language:   The patient's speech was not completely fluent and non-effortful.  Her speech timbre is abnormal. Comment: quiet;  Her speech rate is normal.  Her respirations are within normal range and appropriate to context.  Her speech timbre is normal.  She has no articulation (segmental features) errors.  She has no speech dysdiadochokinesia with repetition of syllables such as "/PA/, /TA/, /KA/, /OM/".  She made no errors during the repetition of rapid syllables and or words such as "caterpillar" "", and "huckleberry"  She has no repetition errors of rapid sequences of consonants, such as in "[employer]" or "[employer]".  She made prosody (suprasegmental features) errors.  Her stress assessment showed no repetition errors in linguistically complex words, including multisyllabic words ("planetarium," "questionable," "accomplishment," "phonetic.  Her tone was emotionally limited, and her tempo was arrhythmic. Comment: monotone;  The patient's speech is not dysarthric.  The patient's speech was without evidence of anomia.  She showed no evidence of anomia during spontaneous speech.  She showed no evidence of anomia during confrontational naming; 12/12 (correct chocolate bar, kangaroo, theater, Orthodoxy, doctor, potato, battery, ice cube tray, thermometer, flower, bomb, and " "calendar).  She makes no phonological loop errors.  She makes no errors during the repetition of gibberish words (e.g., "Supercalifragilisticexpialidocious," "Pigglywiggly," "Woosp[place]oo," "Zowzy," "Bazinga").  She makes no errors during the repetition of complex meaningless phrases (e.g., "The horse raced past the barn fell.", "The complex houses  and single soldiers and their families," "Wishes are hopping, and trees are west," and "Brushing liked to yvonne salamanca's direction").  She can comprehend commands that cross the midline (e.g., with your left thumb, touch your right ear).  She can comprehend commands that depend on syntax (e.g., point to the ceiling after you point to the floor).  Her speech is grammatically intact; (no function/semantic word substitutions, phonemic/semantic paraphasias, or binary confusion).  Motor:   The patient's bilateral upper extremity muscle bulk is appropriate.  The patient's upper extremity muscle tone is increased. Comment: B/L;  The patient's bilateral upper extremity muscle tone does not suggest spasticity.  There is evidence of rigidity/cogwheeling. Comment: B/L, R>L, Mild; Muscle tone is increased and there is evidence of rigidity/cogwheeling.  There is evidence of paratonia. Comment: B/L, R>L, Mild; Muscle tone is increased and there is evidence of paratonia.  There was no dystonia observed on examination.  Assessment of motor strength was symmetric and at minimal anti-gravity.  Deltoid L +5/5 R +5/5 Biceps L +5/5 R +5/5 Triceps L +5/5 R +5/5 Wrist extension L +5/5 R +5/5 Finger abduction L +5/5 R +5/5 Hip flexion L +5/5 R +5/5 Hip extension L +5/5 R +5/5 Knee flexion L +5/5 R +5/5 Knee extension L +5/5 R +5/5 Ankle flexion L +5/5 R +5/5 Ankle extension L +5/5 R +5/5  There is no pronator or downward drift.  There is no upward drift.  There is no outward/diagonal drift.  There is no myoclonus observed in The patient's bilateral upper and lower extremities.  There are no " fasciculations observed in The patient's bilateral upper and lower extremities.  Coordination:   She showed evidence of upper extremity limb dysmetria during past pointing on finger-nose-finger.  She has no bilateral lower extremity limb dysmetria during shin rub.  She has no limb dysdiadochokinesia of the upper extremity on the pronation/supination test and screwing in a light bulb or lower extremity during tapping ball of each foot bilaterally.  She has no cerebellar rebound bilaterally.  She has no visible tremor.  She has no kinetic tremor bilaterally.  She has no postural tremor bilaterally.  She has no resting tremor bilaterally.  She has evidence of interhemispheric motor control deficits.  She demonstrates evidence of motor overflow. Comment: right to left, left to right;  She demonstrates alien limb phenomena.  She has dyskinetic movements. Comment: on presentation patient has mild oral facial dyskinesias improved compared to baseline 3 months prior; on presentation patient has mild oral facial dyskinesias improved compared to baseline 3 months prior  She has no athetosis.  She has no choreiform movements.  She has no ballistic movements.  She has evidence of akathisia. Comment: mild significant improvement compared to last time seen; mild significant improvement compared to last time seen  She has evidence of tardive dyskinesia. Comment: involuntary blinking, unintentional/uncontrollable limb movements; mild significant improvement compared to last time seen  The patient's upper extremity fine motor coordination was abnormal. Comment: B/L, R>L, Mild;  The patient's bilateral upper extremity coordination with finger tapping, pronation/supination, and the open-close fist showed slowing.  The patient's upper extremity fine motor coordination was not hypometric. Comment: B/L, R>L, Mild; finger tapping, pronation/supination, and the open-close fist showed hypometria.  The patient's upper extremity fine motor  coordination was not dysrhythmic. Comment: B/L, R>L, Mild; finger tapping, pronation/supination, and the open-close fist showed dysrhythmia.  Higher Cortical Function:   The patient showed no evidence of simultanagnosia (Navon hierarchical letters).  She demonstrates no evidence of dorsal simultanagnosia (overlapping objects).  She demonstrates no evidence of ventral simultanagnosia (complex picture synthesis).  The patient showed evidence of visuospatial constructional dysfunction.  The patient showed no evidence of agnosia.  The patient showed evidence of apraxia.  She showed no evidence of ideomotor apraxia performing tool-use pantomimes (e.g., use a hammer, use a screwdriver, use a comb, flip a coin, waving goodbye).  She showed no evidence of ideational apraxia (e.g., taking off and putting on shoes, folding paper into an envelope).  She showed evidence of limb-motor apraxia during mimicking complex bimanual hand shapes.  She showed no evidence of buccofacial apraxia (e.g., blow out a candle, puff out cheeks, and whistle).  She showed dysexecutive behavior.  She showed no utilization or imitation behavior.  She has evidence of perseverative or stereotyped behavior.  She has no stimulus-bound behavior.  Sensory:   Her cortical sensory assessment demonstrated no neglect bilaterally.  Her sensation was diminished to light touch, and vibratory sense. Comment: in the bilateral upper and lower extremities in a length-dependant pattern.; in the bilateral upper and lower extremities in a length-dependant pattern.  Reflexes:   Reflexes were symmetric and 2+ at biceps, 2+ triceps, and 2+ brachioradialis, 2+ at the knees bilaterally, there was no cross-abductor sign, 2+ in the bilateral ankles.  Gait:   She has normal posture sitting unaided.  She can arise from a chair and sit back down without using their arms.  Her gait was normal.  Her posture while walking is normal.  Her gait initiation/inhibition was normal.  Her  stance while walking is normal.  Her gait speed (6 meters) was normal (70-80 F 1.13 m/s M 1.26 m/s, >80 F 0.94 m/sec, M 0.97 m/sec).  Her stride including step-time, step-width, and step-length was normal.  Her arms swing is symmetric and of normal amplitude.  She takes turns in <4 steps.  She has no truncal ataxia.  When attempting to walk abnormally (heels, tiptoes, tandem), she makes no errors.  Neuropsychological Evaluation Summary:  Prior Neurocognitive/Neurobehavioral Evaluation(s)  Informal Neuropsych testing in December 2020  MoCA = 24/30  Informal Neuropsych testing in 3/17/21  Mini Mental Status Exam [MMSE] = 26/30  MoCA = 24/30  Informal Neuropsych testing in 4/2/21  [place] Cognitive Assessment [MoCA] = 20/22).  She had difficulty with auditory attention and delayed recall (2/5 words, 3/3 words with cueing).  Formal Neuropsych testing in 4/12/21  Ms. Garcia completed a neuropsychological evaluation in June 2020. Tests administered included: MoCA; MSVT; Test of Premorbid Functioning; Wechsler Adult Intelligence Scale, Fourth Edition (WAIS-IV) selected subtests; Wechsler Memory Scale, Fourth Edition (WMS-IV), selected subtests; Neuropsychological Assessment Battery (NAB), selected subtests; Verbal fluency tests (FAS & animal naming; Lu et al., 2004 norms); Ellsworth Verbal Learning Test, Revised (HVLT-R; Form 1); Topmost Making Test, parts A and B (Lu et al., 2004 norms); Wisconsin Card Sorting Test-128 (WCST- 128; computer version), Oscar Complex Figure Test, Geriatric Depression Scale (GDS-30); Lees Anxiety Inventory (TABATHA).  Ms. Garcia's performance on neuropsychological testing suggested deficits in areas including executive functioning (with impact on aspects of visuospatial functioning), learning and retention of information, and attention and working memory. Language, processing speed, and aspects of visuospatial functioning were largely consistent with premorbid estimates, taking into account  executive deficits. Comparisons to prior testing suggested declines across most areas that were compared. Testing did not strongly indicate a pattern that would suggest Alzheimer's dementia, as recognition memory and language were intact. Taken together, reports of cognitive, neuropsychiatric, physical, and functional change suggest that a diagnosis of dementia with Lewy bodies should be considered in the differential. Although her symptom picture and test patterns are not entirely consistent, the constellation of symptoms is most suggestive of this etiology at a mild to moderate level, with behavioral disturbance. Visuospatial functioning was also largely within normal limits, with relative reduction on a complex figure drawing task  2022-08-09:  Executive predominant multidomain major cognitive impairment   BEHAV5+ 5/6: See ROS section for a full description  2023-03-28:  Executive predominant multidomain major cognitive impairment   Neurocognitive/Neurobehavioral Evaluation completed on 2024-07-30    Neuropsychiatric/Behavioral Focused Evaluation Assessment    BEHAV5+ 5/6 See ROS section for a full description   Laboratories:     Lab Date Value [Reference]   Autoimmune/Paraneoplastic Screening           GIANNI Screen 2021, Sep-20    None Detected [None Detected]      Sed Rate 2021, Sep-20    7 [0 - 29 mm/Hr]      Metabolic Screening   Hemoglobin A1C External 09/20/2021  5.2 [4.0 - 5.6 %]      Homocysteine 06/13/2020  5.0 [4.0 - 15.5 umol/L]      TSH 04/18/2020  1.500 [0.40 - 4.00 uIU/mL]      Glucose 2021, Apr-28    105 [70 - 110 mg/dL]      Albumin 2021, Apr-28    4.2 [3.5 - 5.2 g/dL]      Alkaline Phosphatase 2021, Apr-28    108 [38 - 145 U/L]      ALT 2021, Apr-28    25 [0 - 35 U/L]      AST 2021, Apr-28    32 [14 - 36 U/L]      BILIRUBIN TOTAL 2021, Apr-28    0.9 [0.2 - 1.3 mg/dL]      PROTEIN TOTAL 2021, Apr-28    7.0 [6.0 - 8.4 g/dL]      Cholesterol 2021, Oct-28    177 [120 - 199 mg/dL]      HDL 2021,  Oct-28    51 [40 - 75 mg/dL]      Non-HDL Cholesterol 2021, Oct-28    126 [mg/dL]      Triglycerides 04/18/2020  281 (H) [30 - 150 mg/dL]      Folate 04/18/2020  >20.0 [4.0 - 24.0 ng/mL]      Thiamine 2020, Apr-18    142 [70 - 180 nmol/L]      Vit D, 25-Hydroxy 04/18/2020  84 [30 - 96 ng/mL]      Vitamin B-12 2020, Apr-18    798 [210 - 950 pg/mL]      Vitamin B2 2020, Apr-18    15 [5 - 50 nmol/L]      Vitamin B6 2020, Jun-13    397.3 (H) [20.0 - 125.0 nmol/L]      Infectious Disease/Immunocompromised Screening   SARS-CoV-2 RNA, Amplification, Qual 2020, Nov-02    Negative      Standard Hematology Screen   Hematocrit 2021, Jan-27    44.4 [37.0 - 48.5 %]      Hemoglobin 2021, Jan-27    14.9 [12.0 - 16.0 g/dL]      MCV 2021, Jan-27    88 [82 - 98 fL]      Platelets 2021, Jan-27    261 [150 - 350 K/uL]      Neuroendocrine/Electrolyte Screening   BUN 2021, Apr-28    20 (H) [7 - 18 mg/dL]      Chloride 2021, Apr-28    106 [95 - 110 mmol/L]      Creatinine 2021, Apr-28    0.84 [0.5 - 1.4 mg/dL]      Potassium 2021, Apr-28    4.6 [3.5 - 5.1 mmol/L]      Sodium 2021, Apr-28    142 [136 - 145 mmol/L]      Delirium Screening   Bacteria, UA 2021, Dec-10    Few (A) [Negative /hpf]      Glucose, UA 2021, Dec-10    Negative      Ketones, UA 2021, Dec-10    Negative      Leukocytes, UA 2021, Dec-10    Negative      NITRITE UA 2021, Dec-10    Negative      Protein, UA 2021, Dec-10    Negative      RBC, UA 2021, Dec-10    0 [0 - 4 /hpf]      WBC, UA 2021, Dec-10    1 [0 - 5 /hpf]           Neuroimaging:    Brain Fluorodeoxyglucose-positron emission tomography on 8/20/21  Technique: Following injection of 11.1 mCi of F18 FDG, tomographic emission images were acquired of the brain with a dedicated full ring PET/CT fusion scanner. Non-contrast enhanced CT images were obtained as a transmission scan and for anatomic localization. Blood glucose level: 94 mg/dl. Uptake time: 1 hour 3 minutes. As per PERCIST criteria, SUVlbw is reported. An  individualized dose optimization technique, automated exposure control, was utilized for the performed procedure.  Formal interpretation by Radiology:  Hypometabolism predominantly within the frontal lobes, and to a lesser degree within the left temporal and parietal lobes. 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  Independently reviewed radiological imaging by Phill Pablo MD. MPH. Behavioral Neurologist  Impression: : Though there is visual evidence of decreased metabolism in the left frontal region, there is largely stable asymmetry of the high dorsal left prefrontal region since 2015 which will accordingly skew the relative illuminance of the region    Brain Dopamine transporter SPECT imaging on 8/27/2021  Technique: Following administration of thyroid blocking agent, patient received intravenous dose of 6.95 mCi of I123 Ioflupane. After 3 hour delay, SPECT imaging of the brain is performed.  Formal interpretation by Radiology:  Activity is absent within the left putamen and caudate nucleus. There is also diminished activity in the right putamen. IMPRESSION: ABNORMAL EXAMINATION WITH EVIDENCE FOR STRIATAL DOPAMINERGIC NEURODEGENERATION.  Independently reviewed radiological imaging by Phill Pablo MD. MPH. Behavioral Neurologist  Impression: : there is clear evidence of asymmetric left greater than right decreased dopaminergic reuptake strongly suggesting at minimum degeneration of the dopaminergic network    MRI brain/head with and without contrast on 5/22/2018  Technique: Multiplanar, multisequence pre and post-contrast MRI of the brain and IAC's was performed.  Comparison: None  Formal interpretation by Radiology:  Age-appropriate generalized involutional changes are seen along with findings most compatible with mild chronic microvascular ischemic changes.  Independently reviewed radiological imaging by Phill Pablo MD. MPH. Behavioral Neurologist  T1: No significant cortical atrophy  pattern further testing to be a mild asymmetry in the left greater than right dorsal lateral prefrontal cortex  T2/FLAIR: scattered punctuated subcortical hyperintensity lesions consistent with microvascular ischemic changes were right greater than left  DWI/ADC: No Significant DWI hyperintensities/hypointensities. No ADC correlation.  SWI/GRE: No Significant hypointensities to suggest cortical/subcortical hemosiderin deposition.  Impression: : largely normal MRI brain with scattered microvascular changes most predominantly in the right basal ganglia    MRI brain/head without contrast on 1/27/21  Technique: Multiplanar multisequence MR imaging of the brain was performed without the administration of intravenous contrast.  Comparison: CT head 01/27/2021, MRI brain 05/29/2018  Formal interpretation by Radiology:  Age-appropriate generalized cerebral volume loss with findings suggestive of mild chronic microvascular ischemic change.  Independently reviewed radiological imaging by Phill Pablo MD. MPH. Behavioral Neurologist  T1: Generalized cortical atrophy; mild left greater than right frontal greater than posterior. small cavum septum pellucidum. very mild right greater than left hippocampal atrophy. collectively atrophy pattern seems to be slightly increased compared to 2018  T2/FLAIR: mild bilateral scattered punctuated lesions consistent with hypertensive microvascular ischemic changes. Mild periventricular capping right greater than left. Two areas of focal cavitation in the corona radiata adjacent to the right basal ganglia.  DWI/ADC: No Significant DWI hyperintensities/hypointensities. No ADC correlation.  SWI/GRE: No Significant hypointensities to suggest cortical/subcortical hemosiderin deposition.  Impression: : mild generalized cortical atrophy slightly pronounced in the dorsal lateral prefrontal cortex with some scattered right greater than left subcortical microvascular changes consistent with  hypertensive strokes. microvascular changes seem to be largely stable compared to 2018 however atrophy pattern seems to be slightly pronounced.     Procedures:    Electroencephalogram on 1/27/2021  Formal interpretation:  This is a normal EEG in an awake and drowsy adult. No potentially epileptiform activity was seen. Please be aware that a normal EEG does not exclude the possibility of an underlying seizure disorder.    Independently reviewed Electroencephalogram by Phill Pablo MD. MPH. Behavioral Neurologist  Impression: : normal    Electrocardiogram on 1/27/21  Formal interpretation:  Vent. Rate : 048 BPM     Atrial Rate : 048 BPM    P-R Int : 182 ms          QRS Dur : 080 ms     QT Int : 450 ms       P-R-T Axes : 049 -21 024 degrees    QTc Int : 402 ms Sinus bradycardia Anterolateral infarct ,age undetermined Abnormal ECG  Independently reviewed Electrocardiogram by Phill Pablo MD. MPH. Behavioral Neurologist  Impression: : Received ECG has mild evidence of sinus node disease. HR (>=50-60) 48 BPM. Prolonged VA interval (>0.22 s). Broad QRS complex (> 0.12 s).    Echocardiography on 2/5/21  Formal interpretation:  Concentric hypertrophy and normal systolic function. The estimated ejection fraction is 60% Grade I left ventricular diastolic dysfunction. Normal right ventricular size with normal right ventricular systolic function. Mild tricuspid regurgitation. Normal central venous pressure (3 mmHg). The estimated PA systolic pressure is 29 mmHg.  Independently reviewed Echocardiography by Phill Pablo MD. MPH. Behavioral Neurologist  Impression: : Normal     Clinical Summary:     The patient is a 78-year-old right-handed female with a relevant past medical history of Metabolic syndrome ( hypertension, hyperlipidemia, obesity), migraine headaches, MDD, ERENDIRA on CPAP, who presents reporting a 9-year history of gradually progressive neurocognitive impairment.       The clinical history is suggestive  of:  Memory Impairment: STM encoding impairment, LTM encoding-retrieval impairment, Amnesia of fixation  Attention Impairment: Attention, Alertness, Selective attention, Sustained attention, Shifting attention  Executive Impairment: Energization, Working Memory, Set-Shifting, Response Inhibition  Language Impairment: Language Dysfunction, Grammar Dysfunction, Semantic Dysfunction, Logopenic Dysfunction, Receptive Dysfunction  Visuospatial Impairment: Allocentric Spatial Processing, Cortical Vision Impairment  Motor/Coordination Impairment: Sensory motor integration, Motor weakness  Sensory Impairment: Limbic Dysfunction  Behavior Impairment: Neurovegetative, Emotional Regulation, Self-Preservation Dysregulation, Social Coherence, Sensorimotor Dysregulation, Stimulation Dysregulation  Psychiatric Impairment: Signal-Noise Dysregulation, Mood Regulation, Fixed-False Beliefs, Hallucinations, Neuroleptic Sensitivity  Medical Review of Systems Impairment: Autonomic Dysfunction  iADL Impairment: [place] Instrumental Activities of Daily Living Scale  The neurological examination is significant for:  Cerebellar Dysfunction: dysmetria UE  Cortical Frontal Dysfunction: non-fluent aphasia (fluency)  Cortical Frontal-Parietal Disconnection: apraxia (limb-motor)  Cortical Transcallosal Disconnection: interhemispheric motor control (interhemispheric motor control ), motor efference (motor overflow, alien limb)  Executive Impairment: serial processing, thought disorder, thought disorder, judgment, dysexecutive behavior (perseverative/stereotyped)  Movement Disorder (Hyperkinetic): dyskinetic movements, akathisia, tardive dyskinesia  Movement Disorder (Hypokinetic): parkinsonism (tone, bradykinesia), dyskinesia (hypometria, dysrhythmia)  Movement Disorder (Ocular): abnormal ocular movement (pursuit, abnormal saccades), eyelid apraxia  Movement Disorder (Speech): abnormal vocal features (volume, prosody), AOS (tone)  Sensory  Dysfunction: peripheral (A? fibers)  The neurocognitive battery is significant (based on age and education) for:  Executive predominant multidomain major cognitive impairment   BEHAV5+ 5/6: See ROS section for a full description  The neurologically relevant imaging is significant for  Brain Fluorodeoxyglucose-positron emission tomography (8/20/21): Though there is visual evidence of decreased metabolism in the left frontal region, there is largely stable asymmetry of the high dorsal left prefrontal region since 2015 which will accordingly skew the relative illuminance of the region  Brain Dopamine transporter SPECT imaging (8/27/2021): there is clear evidence of asymmetric left greater than right decreased dopaminergic reuptake strongly suggesting at minimum degeneration of the dopaminergic network  MRI brain/head with and without contrast (5/22/2018): largely normal MRI brain with scattered microvascular changes most predominantly in the right basal ganglia  MRI brain/head without contrast (1/27/21): mild generalized cortical atrophy slightly pronounced in the dorsal lateral prefrontal cortex with some scattered right greater than left subcortical microvascular changes consistent with hypertensive strokes. microvascular changes seem to be largely stable compared to 2018 however atrophy pattern seems to be slightly pronounced.        Assessment:        The patient's clinical presentation is behavior/psychiatric predominant major cognitive impairment (moderate dementia) sufficient to impair activities of daily living (CDR-SOB: 16 , [place] iADL: 8/8 - Severe Dementia).     The patient's clinical presentation meets the criteria for Dementia (McYasmanyann, et al. 2011 Alzheimer's & Dementia).     Concern regarding an intraindividual change in cognition diagnosed through a combination of history and objective cognitive assessment  Impairment in two or more cognitive domains  Interference with independence in functional  abilities.  Represents a decline from previous levels of functioning.  Not explained by delirium or major psychiatric disorder     The patient's clinical syndrome is unclear and has features of multiple syndromes. 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 Neurology 2005). 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 At present, all neurodegenerative diseases can only be diagnosed with 100% certainty through a brain autopsy. 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  There are no plasma protein biomarkers available on record.  There are no CSF protein biomarkers available on record.  There are no dermatological protein biomarkers available on record.  Negative for [employer] testing of 33 genes selected ALS2 ANG ANXA11 DAMARIS CHCHD10 CHMP2B DCTN1 ERBB4 FUS GRN HEXA DKCTYC4B0 ITM2B KIF5A MAPT OPTN PFN1 PRNP PSEN1 PSEN2 SETX SNCA SOD1 SORL1 SPG11 SQSTM1 TARDBP TBK1 TFG TREM2 UBQLN2 VAPB VCP     The patient presents with a nine-year history of gradually progressive executive predominant multi-domain major cognitive impairment, significantly interfering with instrumental activities of daily living. The clinical presentation is best described as frontotemporal degeneration with parkinsonism. Early movement disorders have been managed with Lamictal and N-acetylcysteine. Over the last year and a half, there has been increasing polypharmacy, possibly contributing to worsening urinary incontinence and increasing confusion, particularly with the use of clonazepam.We  recommend tapering off oxybutynin, lowering the dose of modafinil, and considering an adjustment to the clonazepam dosage. The family is in agreement with these recommendations.     The observations made above, were discussed with the patient and their supporting historian(s) (daughter). We have discussed the additional diagnostic(s) and/or managenent below.     Care Management Plan:    #Optimize Neurocognitive Impairment and Quality  We have discussed the MIND Diet and other lifestyle behavior that may help maintain brain health.  We have provided written/digital reading material  Recommend continuing donepezil 10 mg daily  Decreased modafinil from 200-100 mg q.day  #Optimize Behavioral Management and Quality.  No indication for memantine at this time  #Optimize Sleep Hygiene and Quality  We discussed and recommended additional diagnostic/management of sleep disorder to optimize brain health and longevity.  Continue Belsomra 20 mg at night.  #Optimize Movement Disorder and Quality.  Continue acetylcysteine 1800 mg daily  Continue Lamictal 50 mg BID  Increase Linzess to 145 mcg daily  Likely to decrease clonazepam 1 week assuming no significant side effects off modafinil  Consider DVAP for nocturia next appointment  Stop oxybutynin  #Optimize Pain Disorder and Quality.  May consider the addition of Dopa-Mucuna 1 tablet 800mg in AM for muscle tension  May start ibuprophen up to 600mg BID PRN for pain  #Optimize Cerebrovascular Health.  The patient has a documented history of hyperlipidemia and/or hypercholesteremia with long-term complications such as cerebrovascular disease, peripheral vascular disease, and/or aortic atherosclerosis. Collectively these risk factors may contribute to cerebral atherosclerosis, and cerebral hypoperfusion compounded neurocognitive disorder. We discussed maximizing cerebrovascular-related medical therapy, including but not limited to cholesterol medications and antiplatelet agents. We  have discussed the value of aggressively controlling vascular risk factors like hypertension, hyperlipidemia, and Diabetes SBP<130, LDL<100, and A1C<7.0. We discussed the need to optimize lifestyle choices, including a heart-healthy diet (e.g., Mediterranean or DASH), increased cardiovascular exercise (goal 150 minutes of moderate-intensity per week), and staying cognitively and socially active.  Continue Crestor 20 mg  #Behavioral/Environmental Strategies  We recommend engaging in activities that stimulate cognitively and socially while avoiding excessive stimulation and fatigue in overwhelmingly complex situations.  We recommend integrating routine and schedule into your daily life. https://www.alzheimersproject.org/news/the-importance-of-routine-and-familiarity-to-persons-with-dementia/  #Health Maintenance/Lifestyle Advice  We have discussed the value in aggressively controlling vascular risk factors like hypertension, hyperlipidemia, and Diabetes SBP<130, LDL<100, A1C<7.0.  We discussed the need to optimize lifestyle choices including a heart-healthy diet (e.g., Mediterranean or DASH), increased cardiovascular exercise (goal 150 minutes of moderate-intensity per week), and stay cognitively and socially active.  #Support  We all need support sometimes. Get easy access to local resources, community programs, and services. https://www.communityresourcefinder.org/  Learn more about Cognitive Impairment in [place]: https://www.alz.org/professionals/public-health/state-overview/[place]  Learn more about your local community's dementia and neurocognitive impairment resources in [place]: https://www.alz.org/[place]/helping_you  Learn more about your local support for dementia: https://www.alz.org/[place]/helping_you/support  Learn more about housing support for people living with dementia in [place]: https://www.memorycare.com/memory-care-in[place]/  Learn more about financial support for people living  "with dementia in [place]: https://www.dementiacarecentral.com/financial-assistance/  Learn more about caregiver support with the Family Caregiver [place]: http://www.caregiver.org  #Safety  Individuals living with Alzheimer's disease and other dementias are at increased risk for injury or harm in certain areas of the home. As the disease progresses, they may become unaware of the dangers that exist. Consider taking the following precautions to create a safe environment that can prevent dangerous situations from occurring and help maximize independence for as long as possible. https://www.alz.org/help-support/resources/home_safety_checklist  [place] has no laws against driving with dementia specifically but obviously has laws about medical conditions which impact a person's ability to drive safely. If you believe your loved one's driving capacity has diminished, please reach out to either your primary care physician or our office to discuss driving restrictions or revocation of their license. To learn more: https://www.[employer].com/caregiverinfo/driving-problems/  The Alzheimer's Association administers the nationwide "Safe Return" program with identification bracelets, necklaces, or clothing tags and 24-hour assistance. More information is available online at https://www.alz.org/help-support/caregiving/safety/medicalert-with-24-7-wandering-support  #Follow up:  Follow-up as needed.    Thank you for allowing us to participate in the care of your patient. Please do not hesitate to contact us with any questions or concerns.     It was a pleasure seeing The patient and we look forward to seeing them at their follow-up visit.     This note is dictated on M*Modal Fluency Direct word recognition program. There are word recognition mistakes that are occasionally missed on review.       Scheduled Follow-up :  Future Appointments   Date Time Provider Department Center   8/27/2024  2:30 PM Lashaun Lopez DO [facility] " [facility]       After Visit Medication List :     Medication List            Accurate as of July 30, 2024  3:27 PM. If you have any questions, ask your nurse or doctor.                CHANGE how you take these medications      acetylcysteine (bulk) Powd  TAKE 2 TABLETS (1200MG) DAILY FOR 3 WEEKS, THEN TAKE 4 TABLETS DAILY  What changed: additional instructions     midodrine 5 MG Tab  Commonly known as: PROAMATINE  TAKE 2 TABLETS BY MOUTH 3 TIMES A DAY WITH MEALS.  What changed: See the new instructions.            CONTINUE taking these medications      acetaminophen 650 MG Tbsr  Commonly known as: TYLENOL     aspirin 81 MG EC tablet  Commonly known as: ECOTRIN     BELSOMRA 20 mg Tab  Generic drug: suvorexant  TAKE 1 TABLET BY MOUTH EVERY DAY IN THE EVENING     clonazePAM 0.5 MG tablet  Commonly known as: KlonoPIN  Take 1 tablet (0.5 mg total) by mouth 2 (two) times daily as needed for Anxiety.     donepeziL 10 MG tablet  Commonly known as: ARICEPT  Take 1 tablet (10 mg total) by mouth every morning.     lamoTRIgine 25 MG tablet  Commonly known as: LAMICTAL  TAKE 2 TABLETS BY MOUTH 2 TIMES DAILY.     * LINZESS 72 mcg Cap capsule  Generic drug: linaCLOtide  TAKE 1 CAPSULE (72 MCG TOTAL) BY MOUTH BEFORE BREAKFAST     * linaCLOtide 72 mcg Cap capsule  Commonly known as: LINZESS  Take 1 capsule (72 mcg total) by mouth before breakfast.     modafiniL 200 MG Tab  Commonly known as: PROVIGIL  Take 1 tablet (200 mg total) by mouth once daily.     oxybutynin 10 MG 24 hr tablet  Commonly known as: DITROPAN-XL  TAKE 1 TABLET BY MOUTH EVERY DAY     pantoprazole 20 MG tablet  Commonly known as: PROTONIX  Take 1 tablet (20 mg total) by mouth once daily.     rosuvastatin 20 MG tablet  Commonly known as: CRESTOR  TAKE 1 TABLET BY MOUTH EVERY DAY     UNABLE TO FIND           * This list has 2 medication(s) that are the same as other medications prescribed for you. Read the directions carefully, and ask your doctor or other  care provider to review them with you.                  Signing Physician:  Phill Villanueva MD    Billing:       -----------------------------------------------------------------------------  I performed this consultation using real-time Telehealth tools, including a live video connection between my location and the patient's location (their home within the [facility]). Prior to initiating the consultation, I obtained informed verbal consent to perform this consultation using Telehealth tools and answered all the questions about the Telehealth interaction. The participants understand that only a limited neurological exam and limited neuropsychological testing can be performed using Telehealth tools.  I spent a total of 45 minutes (time-in: 15:15 PM; time-out: 16:00 PM) on 2024-07-30, virtually face-to-face with the patient and caregiver(s), >50% of that time was spent counseling regarding the symptoms, treatment plan, risks, therapeutic options, lifestyle modifications, and/or safety issues for the diagnoses above.  10/14 Review of Systems completed and is negative except as stated above in HPI (Systems reviewed: Const, Eyes, ENT, Resp, CV, GI, , MSK, Skin, Neuro)  I reviewed the summation of records from outside physicians for a total of 10 minutes on 2024-07-30. Reviewed and summation of records from an outside physician was performed as summarized above in HPI  I performed a neurobehavioral status examination that included a clinical assessment of thinking, reasoning, and judgment to ensure a comprehensive approach in managing the complex and evolving needs of the patient's neurocognitive condition. Please see above HPI and ROS for full details. This exam was performed on 2024-07-30 and included 11 minutes spent on direct face-to-face clinical observation and interview with the patient and 22 minutes spent interpreting test results and preparing the report. The total time of 33 minutes spent on the  neurobehavioral status examination is not included in the time spent on evaluation and management coding.  Total Billing time spent on encounter/documentation for this patient's evaluation and management, not including the neurobehavioral status examination: 44 minutes.

## 2024-08-02 ENCOUNTER — PATIENT OUTREACH (OUTPATIENT)
Dept: NEUROLOGY | Facility: CLINIC | Age: 79
End: 2024-08-02
Payer: MEDICARE

## 2024-08-02 RX ORDER — SUVOREXANT 20 MG/1
1 TABLET, FILM COATED ORAL NIGHTLY
Qty: 90 TABLET | Refills: 3 | Status: SHIPPED | OUTPATIENT
Start: 2024-08-02

## 2024-08-02 NOTE — PROGRESS NOTES
Dementia Care Management    Recruitment Call      Date of Service: 2024  Care Navigator completing this form: Lily Dunn  Referred by: Former Care Ecosystem Participant   PCP: BRENDEN Bailey Jr., MD    Patient: Christine Garcia  MRN: [id number]  : 1945  Age: 78 y.o.  Gender: female  Race: White  Ethnicity: Not  or /a    Objective:   Patient and caregiver potentially eligible for [facility] dementia care management programs.    CTN/SW completed outreach attempt on 2024 to assess patient/caregiver interest in the program and screen for eligibility.       Outreach Outcome:   Caregiver Aliza not available. CTN will resend email for caregiver to reply with her availability for next week.     CTN will make fourth outreach attempt on  if she has not heard back from caregiver by then.

## 2024-08-08 ENCOUNTER — PATIENT MESSAGE (OUTPATIENT)
Dept: NEUROLOGY | Facility: CLINIC | Age: 79
End: 2024-08-08
Payer: MEDICARE

## 2024-09-04 ENCOUNTER — PATIENT MESSAGE (OUTPATIENT)
Dept: NEUROLOGY | Facility: CLINIC | Age: 79
End: 2024-09-04
Payer: MEDICARE

## 2024-10-23 RX ORDER — CLONAZEPAM 0.5 MG/1
0.5 TABLET ORAL 2 TIMES DAILY PRN
Qty: 60 TABLET | Refills: 0 | Status: SHIPPED | OUTPATIENT
Start: 2024-10-23 | End: 2025-10-23

## 2024-12-31 DIAGNOSIS — G31.83 LEWY BODY DEMENTIA WITH BEHAVIORAL DISTURBANCE: Primary | ICD-10-CM

## 2024-12-31 DIAGNOSIS — F02.818 LEWY BODY DEMENTIA WITH BEHAVIORAL DISTURBANCE: Primary | ICD-10-CM

## 2024-12-31 RX ORDER — CLONAZEPAM 0.5 MG/1
0.5 TABLET ORAL 2 TIMES DAILY PRN
Qty: 60 TABLET | Refills: 0 | Status: SHIPPED | OUTPATIENT
Start: 2024-12-31 | End: 2025-12-31

## 2025-01-03 NOTE — TELEPHONE ENCOUNTER
Called and spoke with Pts daughter in regards to informing her that Gill NP refilled the medication and sent it to the pharmacy on file, the daughter appreciated the phone call.

## 2025-01-24 ENCOUNTER — OUTPATIENT CASE MANAGEMENT (OUTPATIENT)
Dept: ADMINISTRATIVE | Facility: OTHER | Age: 80
End: 2025-01-24
Payer: MEDICARE

## 2025-01-24 DIAGNOSIS — G31.83 LEWY BODY DEMENTIA WITH BEHAVIORAL DISTURBANCE: ICD-10-CM

## 2025-01-24 DIAGNOSIS — G31.09 FRONTOTEMPORAL LOBAR DEGENERATION: ICD-10-CM

## 2025-01-24 DIAGNOSIS — F02.80 FRONTOTEMPORAL LOBAR DEGENERATION: ICD-10-CM

## 2025-01-24 DIAGNOSIS — F02.818 LEWY BODY DEMENTIA WITH BEHAVIORAL DISTURBANCE: ICD-10-CM

## 2025-01-27 ENCOUNTER — PATIENT MESSAGE (OUTPATIENT)
Dept: NEUROLOGY | Facility: CLINIC | Age: 80
End: 2025-01-27
Payer: MEDICARE

## 2025-01-27 ENCOUNTER — PATIENT OUTREACH (OUTPATIENT)
Dept: NEUROLOGY | Facility: CLINIC | Age: 80
End: 2025-01-27
Payer: MEDICARE

## 2025-01-27 NOTE — PROGRESS NOTES
1/27/2025 - Message sent to Lily hogue re: reaching out to Mrs. Abrams via [employer] for Mrs. Garcia re: GUIDE program need.  Received return message she will follow up.  She was thanked for her assistance.    1/24/2025 - During enrollment of Mr. Garcia, Mrs. Abrams, daughter and HCPOA of both  & Mrs. Garcia, requested information to be sent via [employer] on the GUIDE program with [facility].  She notes Mrs. Garcia has dementia.  She was informed OPCM RN will send message to Lily Dunn on Monday re: her request.  Reviewed for [facility] program but at this time, no additional needs other than GUIDE program.

## 2025-01-27 NOTE — PROGRESS NOTES
Dementia Care Management    Recruitment Attempt      Date of Service: 2025  Care Navigator completing this form: Lily Dunn  PCP: BRENDEN Bailey Jr., MD    Patient: Christine Garcia  MRN: [id number]  : 1945  Age: 79 y.o.  Gender: female  Race: White  Ethnicity: Not  or /a    Objective:   Patient and caregiver potentially eligible for [facility] dementia care management programs.CTN had reached out to primary caregiver last year, without success.     OP[facility] RN, Tanika Quinteros, referred dyad to CTN today; family is interested in receiving dementia care support. RN communicated that [employer] was the caregiver's (Aliza -daughter) preferred method of communication. CTN sent [facility] Program info via [employer].    CTN/SW completed outreach attempt on 2025 to assess patient/caregiver interest in the program and screen for eligibility.

## 2025-01-30 RX ORDER — CLONAZEPAM 0.5 MG/1
0.5 TABLET ORAL 2 TIMES DAILY PRN
Qty: 60 TABLET | Refills: 0 | Status: SHIPPED | OUTPATIENT
Start: 2025-01-30 | End: 2026-01-30

## 2025-01-30 RX ORDER — ROSUVASTATIN CALCIUM 20 MG/1
20 TABLET, COATED ORAL NIGHTLY
Qty: 90 TABLET | Refills: 3 | Status: SHIPPED | OUTPATIENT
Start: 2025-01-30

## 2025-01-30 RX ORDER — DONEPEZIL HYDROCHLORIDE 10 MG/1
10 TABLET, FILM COATED ORAL EVERY MORNING
Qty: 90 TABLET | Refills: 3 | Status: SHIPPED | OUTPATIENT
Start: 2025-01-30 | End: 2026-01-30

## 2025-01-30 RX ORDER — SUVOREXANT 20 MG/1
1 TABLET, FILM COATED ORAL NIGHTLY
Qty: 90 TABLET | Refills: 3 | Status: SHIPPED | OUTPATIENT
Start: 2025-01-30

## 2025-01-30 RX ORDER — LAMOTRIGINE 25 MG/1
50 TABLET ORAL 2 TIMES DAILY
Qty: 360 TABLET | Refills: 3 | Status: SHIPPED | OUTPATIENT
Start: 2025-01-30

## 2025-01-31 DIAGNOSIS — F02.80 FRONTOTEMPORAL LOBAR DEGENERATION: ICD-10-CM

## 2025-01-31 DIAGNOSIS — G31.09 FRONTOTEMPORAL LOBAR DEGENERATION: ICD-10-CM

## 2025-02-01 DIAGNOSIS — G31.09 FRONTOTEMPORAL LOBAR DEGENERATION: ICD-10-CM

## 2025-02-01 DIAGNOSIS — F02.80 FRONTOTEMPORAL LOBAR DEGENERATION: ICD-10-CM

## 2025-02-04 RX ORDER — SUVOREXANT 20 MG/1
1 TABLET, FILM COATED ORAL NIGHTLY
Qty: 30 TABLET | Refills: 0 | Status: SHIPPED | OUTPATIENT
Start: 2025-02-04

## 2025-02-04 RX ORDER — SUVOREXANT 20 MG/1
1 TABLET, FILM COATED ORAL NIGHTLY
Qty: 30 TABLET | Refills: 3 | Status: CANCELLED | OUTPATIENT
Start: 2025-02-04

## 2025-02-13 ENCOUNTER — PATIENT MESSAGE (OUTPATIENT)
Facility: CLINIC | Age: 80
End: 2025-02-13
Payer: MEDICARE

## 2025-02-13 DIAGNOSIS — F02.818 LEWY BODY DEMENTIA WITH BEHAVIORAL DISTURBANCE: ICD-10-CM

## 2025-02-13 DIAGNOSIS — G31.83 LEWY BODY DEMENTIA WITH BEHAVIORAL DISTURBANCE: ICD-10-CM

## 2025-02-14 RX ORDER — CLONAZEPAM 0.5 MG/1
0.5 TABLET ORAL DAILY
Qty: 90 TABLET | Refills: 3 | Status: SHIPPED | OUTPATIENT
Start: 2025-02-14 | End: 2026-02-14

## 2025-02-25 ENCOUNTER — PATIENT MESSAGE (OUTPATIENT)
Facility: CLINIC | Age: 80
End: 2025-02-25
Payer: MEDICARE

## 2025-02-26 PROBLEM — G93.41 ACUTE METABOLIC ENCEPHALOPATHY: Status: ACTIVE | Noted: 2025-02-26

## 2025-02-26 PROBLEM — R55 SYNCOPE: Status: ACTIVE | Noted: 2025-02-26

## 2025-02-26 PROBLEM — R94.31 PROLONGED Q-T INTERVAL ON ECG: Status: ACTIVE | Noted: 2025-02-26

## 2025-02-26 PROBLEM — Z71.89 ACP (ADVANCE CARE PLANNING): Status: ACTIVE | Noted: 2025-02-26

## 2025-03-03 ENCOUNTER — PATIENT MESSAGE (OUTPATIENT)
Facility: CLINIC | Age: 80
End: 2025-03-03
Payer: MEDICARE

## 2025-05-16 ENCOUNTER — PATIENT MESSAGE (OUTPATIENT)
Facility: CLINIC | Age: 80
End: 2025-05-16
Payer: MEDICARE

## 2025-08-25 ENCOUNTER — PATIENT MESSAGE (OUTPATIENT)
Facility: CLINIC | Age: 80
End: 2025-08-25
Payer: MEDICARE

## 2025-08-29 DIAGNOSIS — F02.80 FRONTOTEMPORAL LOBAR DEGENERATION: ICD-10-CM

## 2025-08-29 DIAGNOSIS — G31.09 FRONTOTEMPORAL LOBAR DEGENERATION: ICD-10-CM

## 2025-08-29 RX ORDER — SUVOREXANT 20 MG/1
1 TABLET, FILM COATED ORAL NIGHTLY
Qty: 90 TABLET | Refills: 1 | Status: SHIPPED | OUTPATIENT
Start: 2025-08-29